# Patient Record
Sex: FEMALE | Race: BLACK OR AFRICAN AMERICAN | NOT HISPANIC OR LATINO | Employment: OTHER | URBAN - METROPOLITAN AREA
[De-identification: names, ages, dates, MRNs, and addresses within clinical notes are randomized per-mention and may not be internally consistent; named-entity substitution may affect disease eponyms.]

---

## 2018-06-27 ENCOUNTER — HOSPITAL ENCOUNTER (EMERGENCY)
Facility: HOSPITAL | Age: 58
Discharge: HOME | End: 2018-06-27
Attending: EMERGENCY MEDICINE
Payer: COMMERCIAL

## 2018-06-27 ENCOUNTER — APPOINTMENT (EMERGENCY)
Dept: RADIOLOGY | Facility: HOSPITAL | Age: 58
End: 2018-06-27
Payer: COMMERCIAL

## 2018-06-27 ENCOUNTER — APPOINTMENT (EMERGENCY)
Dept: CT IMAGING | Facility: HOSPITAL | Age: 58
End: 2018-06-27
Payer: COMMERCIAL

## 2018-06-27 VITALS
WEIGHT: 165 LBS | OXYGEN SATURATION: 98 % | TEMPERATURE: 97.4 F | RESPIRATION RATE: 23 BRPM | SYSTOLIC BLOOD PRESSURE: 146 MMHG | DIASTOLIC BLOOD PRESSURE: 90 MMHG | HEART RATE: 71 BPM

## 2018-06-27 DIAGNOSIS — Z91.19 PERSONAL HISTORY OF NONCOMPLIANCE WITH MEDICAL TREATMENT: ICD-10-CM

## 2018-06-27 DIAGNOSIS — R51.9 HEADACHE: Primary | ICD-10-CM

## 2018-06-27 LAB
ANION GAP SERPL CALCULATED.3IONS-SCNC: 8 MMOL/L (ref 5–14)
BASOPHILS # BLD AUTO: 0.1 THOUSANDS/ΜL (ref 0–0.1)
BASOPHILS NFR BLD AUTO: 1 % (ref 0–1)
BUN SERPL-MCNC: 11 MG/DL (ref 5–25)
CALCIUM SERPL-MCNC: 9.9 MG/DL (ref 8.4–10.2)
CHLORIDE SERPL-SCNC: 109 MMOL/L (ref 97–108)
CO2 SERPL-SCNC: 27 MMOL/L (ref 22–30)
CREAT SERPL-MCNC: 0.76 MG/DL (ref 0.6–1.2)
EOSINOPHIL # BLD AUTO: 0.1 THOUSAND/ΜL (ref 0–0.4)
EOSINOPHIL NFR BLD AUTO: 1 % (ref 0–6)
ERYTHROCYTE [DISTWIDTH] IN BLOOD BY AUTOMATED COUNT: 14.6 %
GFR SERPL CREATININE-BSD FRML MDRD: 100 ML/MIN/1.73SQ M
GLUCOSE SERPL-MCNC: 85 MG/DL (ref 70–99)
HCT VFR BLD AUTO: 40.1 % (ref 36–46)
HGB BLD-MCNC: 13.6 G/DL (ref 12–16)
LYMPHOCYTES # BLD AUTO: 3.2 THOUSANDS/ΜL (ref 0.5–4)
LYMPHOCYTES NFR BLD AUTO: 42 % (ref 20–50)
MCH RBC QN AUTO: 33 PG (ref 26–34)
MCHC RBC AUTO-ENTMCNC: 34.1 G/DL (ref 31–36)
MCV RBC AUTO: 97 FL (ref 80–100)
MONOCYTES # BLD AUTO: 0.5 THOUSAND/ΜL (ref 0.2–0.9)
MONOCYTES NFR BLD AUTO: 6 % (ref 1–10)
NEUTROPHILS # BLD AUTO: 3.9 THOUSANDS/ΜL (ref 1.8–7.8)
NEUTS SEG NFR BLD AUTO: 50 % (ref 45–65)
PLATELET # BLD AUTO: 267 THOUSANDS/UL (ref 150–450)
PMV BLD AUTO: 8.6 FL (ref 8.9–12.7)
POTASSIUM SERPL-SCNC: 3.7 MMOL/L (ref 3.6–5)
RBC # BLD AUTO: 4.13 MILLION/UL (ref 4–5.2)
SODIUM SERPL-SCNC: 144 MMOL/L (ref 137–147)
TROPONIN I SERPL-MCNC: <0.01 NG/ML (ref 0–0.03)
WBC # BLD AUTO: 7.7 THOUSAND/UL (ref 4.5–11)

## 2018-06-27 PROCEDURE — 85025 COMPLETE CBC W/AUTO DIFF WBC: CPT | Performed by: EMERGENCY MEDICINE

## 2018-06-27 PROCEDURE — 84484 ASSAY OF TROPONIN QUANT: CPT | Performed by: EMERGENCY MEDICINE

## 2018-06-27 PROCEDURE — 99284 EMERGENCY DEPT VISIT MOD MDM: CPT

## 2018-06-27 PROCEDURE — 36415 COLL VENOUS BLD VENIPUNCTURE: CPT | Performed by: EMERGENCY MEDICINE

## 2018-06-27 PROCEDURE — 93005 ELECTROCARDIOGRAM TRACING: CPT

## 2018-06-27 PROCEDURE — 70450 CT HEAD/BRAIN W/O DYE: CPT

## 2018-06-27 PROCEDURE — 71046 X-RAY EXAM CHEST 2 VIEWS: CPT

## 2018-06-27 PROCEDURE — 80048 BASIC METABOLIC PNL TOTAL CA: CPT | Performed by: EMERGENCY MEDICINE

## 2018-06-27 PROCEDURE — 96374 THER/PROPH/DIAG INJ IV PUSH: CPT

## 2018-06-27 RX ORDER — 0.9 % SODIUM CHLORIDE 0.9 %
3 VIAL (ML) INJECTION AS NEEDED
Status: DISCONTINUED | OUTPATIENT
Start: 2018-06-27 | End: 2018-06-27 | Stop reason: HOSPADM

## 2018-06-27 RX ORDER — TRAMADOL HYDROCHLORIDE 50 MG/1
50 TABLET ORAL ONCE
Status: COMPLETED | OUTPATIENT
Start: 2018-06-27 | End: 2018-06-27

## 2018-06-27 RX ORDER — ENALAPRIL MALEATE 10 MG/1
10 TABLET ORAL DAILY
Status: ON HOLD | COMMUNITY
End: 2018-09-20

## 2018-06-27 RX ORDER — LORAZEPAM 0.5 MG/1
TABLET ORAL
Status: COMPLETED
Start: 2018-06-27 | End: 2018-06-27

## 2018-06-27 RX ORDER — HYDRALAZINE HYDROCHLORIDE 20 MG/ML
10 INJECTION INTRAMUSCULAR; INTRAVENOUS ONCE
Status: COMPLETED | OUTPATIENT
Start: 2018-06-27 | End: 2018-06-27

## 2018-06-27 RX ORDER — HYDRALAZINE HYDROCHLORIDE 20 MG/ML
INJECTION INTRAMUSCULAR; INTRAVENOUS
Status: COMPLETED
Start: 2018-06-27 | End: 2018-06-27

## 2018-06-27 RX ORDER — LORAZEPAM 0.5 MG/1
0.5 TABLET ORAL ONCE
Status: COMPLETED | OUTPATIENT
Start: 2018-06-27 | End: 2018-06-27

## 2018-06-27 RX ORDER — TRAMADOL HYDROCHLORIDE 50 MG/1
TABLET ORAL
Status: DISCONTINUED
Start: 2018-06-27 | End: 2018-06-27 | Stop reason: HOSPADM

## 2018-06-27 RX ADMIN — LORAZEPAM 0.5 MG: 0.5 TABLET ORAL at 04:05

## 2018-06-27 RX ADMIN — TRAMADOL HYDROCHLORIDE 50 MG: 50 TABLET, COATED ORAL at 04:11

## 2018-06-27 RX ADMIN — HYDRALAZINE HYDROCHLORIDE 10 MG: 20 INJECTION INTRAMUSCULAR; INTRAVENOUS at 03:22

## 2018-06-27 NOTE — DISCHARGE INSTRUCTIONS
Acute Headache   WHAT YOU NEED TO KNOW:   An acute headache is pain or discomfort that starts suddenly and gets worse quickly  You may have an acute headache only when you feel stress or eat certain foods  Other acute headache pain can happen every day, and sometimes several times a day  DISCHARGE INSTRUCTIONS:   Return to the emergency department if:   · You have severe pain  · You have numbness or weakness on one side of your face or body  · You have a headache that occurs after a blow to the head, a fall, or other trauma  · You have a headache, are forgetful or confused, or have trouble speaking  · You have a headache, stiff neck, and a fever  Contact your healthcare provider if:   · You have a constant headache and are vomiting  · You have a headache each day that does not get better, even after treatment  · You have changes in your headaches, or new symptoms that occur when you have a headache  · You have questions or concerns about your condition or care  Medicines: You may need any of the following:  · Prescription pain medicine  may be given  The medicine your healthcare provider recommends will depend on the kind of headaches you have  You will need to take prescription headache medicines as directed to prevent a problem called rebound headache  These headaches happen with regular use of pain relievers for headache disorders  · NSAIDs , such as ibuprofen, help decrease swelling, pain, and fever  This medicine is available with or without a doctor's order  NSAIDs can cause stomach bleeding or kidney problems in certain people  If you take blood thinner medicine, always ask your healthcare provider if NSAIDs are safe for you  Always read the medicine label and follow directions  · Acetaminophen  decreases pain and fever  It is available without a doctor's order  Ask how much to take and how often to take it  Follow directions   Read the labels of all other medicines you are using to see if they also contain acetaminophen, or ask your doctor or pharmacist  Acetaminophen can cause liver damage if not taken correctly  Do not use more than 3 grams (3,000 milligrams) total of acetaminophen in one day  · Antidepressants  may be given for some kinds of headaches  · Take your medicine as directed  Contact your healthcare provider if you think your medicine is not helping or if you have side effects  Tell him or her if you are allergic to any medicine  Keep a list of the medicines, vitamins, and herbs you take  Include the amounts, and when and why you take them  Bring the list or the pill bottles to follow-up visits  Carry your medicine list with you in case of an emergency  Manage your symptoms:   · Apply heat or ice  on the headache area  Use a heat or ice pack  For an ice pack, you can also put crushed ice in a plastic bag  Cover the pack or bag with a towel before you apply it to your skin  Ice and heat both help decrease pain, and heat also helps decrease muscle spasms  Apply heat for 20 to 30 minutes every 2 hours  Apply ice for 15 to 20 minutes every hour  Apply heat or ice for as long and for as many days as directed  You may alternate heat and ice  · Relax your muscles  Lie down in a comfortable position and close your eyes  Relax your muscles slowly  Start at your toes and work your way up your body  · Keep a record of your headaches  Write down when your headaches start and stop  Include your symptoms and what you were doing when the headache began  Record what you ate or drank for 24 hours before the headache started  Describe the pain and where it hurts  Keep track of what you did to treat your headache and if it worked  Prevent an acute headache:   · Avoid anything that triggers an acute headache  Examples include exposure to chemicals, going to high altitude, or not getting enough sleep  Create a regular sleep routine   Go to sleep at the same time and wake up at the same time each day  Do not use electronic devices before bedtime  These may trigger a headache or prevent you from sleeping well  · Do not smoke  Nicotine and other chemicals in cigarettes and cigars can trigger an acute headache or make it worse  Ask your healthcare provider for information if you currently smoke and need help to quit  E-cigarettes or smokeless tobacco still contain nicotine  Talk to your healthcare provider before you use these products  · Limit alcohol as directed  Alcohol can trigger an acute headache or make it worse  If you have cluster headaches, do not drink alcohol during an episode  For other types of headaches, ask your healthcare provider if it is safe for you to drink alcohol  Ask how much is safe for you to drink, and how often  · Exercise as directed  Exercise can reduce tension and help with headache pain  Aim for 30 minutes of physical activity on most days of the week  Your healthcare provider can help you create an exercise plan  · Eat a variety of healthy foods  Healthy foods include fruits, vegetables, low-fat dairy products, lean meats, fish, whole grains, and cooked beans  Your healthcare provider or dietitian can help you create meals plans if you need to avoid foods that trigger headaches  Follow up with your healthcare provider as directed:  Bring your headache record with you when you see your healthcare provider  Write down your questions so you remember to ask them during your visits  © 2017 [facility] Information is for End User's use only and may not be sold, redistributed or otherwise used for commercial purposes  All illustrations and images included in CareNotes® are the copyrighted property of A D A M [employer]  or Moises Simmons  The above information is an  only  It is not intended as medical advice for individual conditions or treatments   Talk to your doctor, nurse or pharmacist before following any medical regimen to see if it is safe and effective for you

## 2018-06-27 NOTE — ED PROVIDER NOTES
History  Chief Complaint   Patient presents with    Headache     Patient reports worsening headache starting two days ago  She also reports arthritis pain  HPI  61 y/o AAF BBA for reports of worsening h/a x 2 days duration along with chest pressure starting this morning  Patient is a poor historian; non-compliant with medications and unable to answer most questions accurately  Denies shortness of breath and/or nausea with chest pressure  Pressure is constant and worsens with movement  Denies fever  Prior to Admission Medications   Prescriptions Last Dose Informant Patient Reported? Taking?   enalapril (VASOTEC) 10 mg tablet  Self Yes Yes   Sig: Take 10 mg by mouth daily      Facility-Administered Medications: None       Past Medical History:   Diagnosis Date    Diabetes mellitus ([facility] [address] )     Hypertension     Psychiatric disorder        Past Surgical History:   Procedure Laterality Date    HYSTERECTOMY         No family history on file  I have reviewed and agree with the history as documented  Social History   Substance Use Topics    Smoking status: Current Every Day Smoker     Packs/day: 2 00     Types: Cigarettes    Smokeless tobacco: Not on file    Alcohol use Not on file        Review of Systems   Cardiovascular: Positive for chest pain  Neurological: Positive for headaches  All other systems reviewed and are negative  Physical Exam  Physical Exam   Constitutional: She is oriented to person, place, and time  She appears well-developed and well-nourished  HENT:   Head: Normocephalic and atraumatic  Eyes: EOM are normal  Pupils are equal, round, and reactive to light  Neck: Normal range of motion  Neck supple  Cardiovascular: Normal rate, regular rhythm and normal heart sounds  Pulmonary/Chest: Effort normal and breath sounds normal    Abdominal: Soft  Bowel sounds are normal    Musculoskeletal: Normal range of motion     Neurological: She is alert and oriented to person, place, and time  Skin: Skin is warm and dry  Psychiatric: She has a normal mood and affect  Vitals reviewed  Vital Signs  ED Triage Vitals [06/27/18 0303]   Temperature Pulse Respirations Blood Pressure SpO2   (!) 96 6 °F (35 9 °C) 68 20 (!) 196/110 --      Temp Source Heart Rate Source Patient Position - Orthostatic VS BP Location FiO2 (%)   Tympanic Monitor Sitting Right arm --      Pain Score       Worst Possible Pain           Vitals:    06/27/18 0303   BP: (!) 196/110   Pulse: 68   Patient Position - Orthostatic VS: Sitting       Visual Acuity      ED Medications  Medications   sodium chloride (PF) 0 9 % injection 3 mL (not administered)   hydrALAZINE (APRESOLINE) injection 10 mg (not administered)       Diagnostic Studies  Results Reviewed     Procedure Component Value Units Date/Time    CBC and differential [[id number]] Collected:  06/27/18 0315    Lab Status:  No result Specimen:  Blood from Arm, Left     Basic metabolic panel [[id number]] Collected:  06/27/18 0315    Lab Status:  No result Specimen:  Blood from Arm, Left     Troponin I [[id number]] Collected:  06/27/18 0315    Lab Status:  No result Specimen:  Blood from Arm, Left                  X-ray chest 2 views    (Results Pending)   CT head without contrast    (Results Pending)              Procedures  Procedures       Phone Contacts  ED Phone Contact    ED Course  ED Course as of Jun 27 0508 Wed Jun 27, 2018   0321 EKG: NSR @ 66 bpm, no ischemic changes                                MDM  Number of Diagnoses or Management Options  Diagnosis management comments: Patient feeling much better; bp improved to 160/93  Will refer to [facility] for med management         Amount and/or Complexity of Data Reviewed  Clinical lab tests: ordered and reviewed  Tests in the radiology section of CPT®: ordered and reviewed    Risk of Complications, Morbidity, and/or Mortality  Presenting problems: moderate  Diagnostic procedures: minimal  Management options: low    Patient Progress  Patient progress: improved    CritCare Time    Disposition  Final diagnoses:   None     ED Disposition     None      Follow-up Information    None         Patient's Medications   Discharge Prescriptions    No medications on file     No discharge procedures on file      ED Provider  Electronically Signed by           Saúl Godinez DO  06/27/18 0983

## 2018-06-28 LAB
ATRIAL RATE: 66 BPM
P AXIS: 56 DEGREES
PR INTERVAL: 166 MS
QRS AXIS: 15 DEGREES
QRSD INTERVAL: 72 MS
QT INTERVAL: 454 MS
QTC INTERVAL: 475 MS
T WAVE AXIS: 25 DEGREES
VENTRICULAR RATE: 66 BPM

## 2018-06-28 PROCEDURE — 93010 ELECTROCARDIOGRAM REPORT: CPT | Performed by: INTERNAL MEDICINE

## 2018-09-20 ENCOUNTER — APPOINTMENT (OUTPATIENT)
Dept: NEUROLOGY | Facility: HOSPITAL | Age: 58
End: 2018-09-20
Payer: COMMERCIAL

## 2018-09-20 ENCOUNTER — APPOINTMENT (EMERGENCY)
Dept: CT IMAGING | Facility: HOSPITAL | Age: 58
End: 2018-09-20
Payer: COMMERCIAL

## 2018-09-20 ENCOUNTER — HOSPITAL ENCOUNTER (OUTPATIENT)
Facility: HOSPITAL | Age: 58
Setting detail: OBSERVATION
Discharge: HOME | End: 2018-09-20
Attending: EMERGENCY MEDICINE | Admitting: HOSPITALIST
Payer: COMMERCIAL

## 2018-09-20 ENCOUNTER — APPOINTMENT (OUTPATIENT)
Dept: NON INVASIVE DIAGNOSTICS | Facility: HOSPITAL | Age: 58
End: 2018-09-20
Payer: COMMERCIAL

## 2018-09-20 VITALS
RESPIRATION RATE: 18 BRPM | TEMPERATURE: 97 F | DIASTOLIC BLOOD PRESSURE: 101 MMHG | HEART RATE: 55 BPM | SYSTOLIC BLOOD PRESSURE: 157 MMHG | HEIGHT: 64 IN | BODY MASS INDEX: 27.66 KG/M2 | OXYGEN SATURATION: 96 % | WEIGHT: 162.04 LBS

## 2018-09-20 DIAGNOSIS — Z91.89 AT RISK FOR POLYPHARMACY: ICD-10-CM

## 2018-09-20 DIAGNOSIS — R00.1 BRADYCARDIA: ICD-10-CM

## 2018-09-20 DIAGNOSIS — Z72.0 TOBACCO ABUSE: ICD-10-CM

## 2018-09-20 DIAGNOSIS — I10 ESSENTIAL HYPERTENSION: Chronic | ICD-10-CM

## 2018-09-20 DIAGNOSIS — I95.9 HYPOTENSION: Primary | ICD-10-CM

## 2018-09-20 DIAGNOSIS — R09.02 HYPOXIA: ICD-10-CM

## 2018-09-20 DIAGNOSIS — R41.82 ALTERED MENTAL STATUS: ICD-10-CM

## 2018-09-20 DIAGNOSIS — R55 SYNCOPE: ICD-10-CM

## 2018-09-20 PROBLEM — F41.9 ANXIETY AND DEPRESSION: Status: ACTIVE | Noted: 2018-09-20

## 2018-09-20 PROBLEM — E11.9 TYPE 2 DIABETES MELLITUS WITHOUT COMPLICATION, WITHOUT LONG-TERM CURRENT USE OF INSULIN (HCC): Chronic | Status: RESOLVED | Noted: 2018-09-20 | Resolved: 2018-09-20

## 2018-09-20 PROBLEM — E11.9 TYPE 2 DIABETES MELLITUS WITHOUT COMPLICATION, WITHOUT LONG-TERM CURRENT USE OF INSULIN (HCC): Chronic | Status: ACTIVE | Noted: 2018-09-20

## 2018-09-20 PROBLEM — F32.A ANXIETY AND DEPRESSION: Status: ACTIVE | Noted: 2018-09-20

## 2018-09-20 PROBLEM — E11.9 TYPE 2 DIABETES MELLITUS WITHOUT COMPLICATION, WITHOUT LONG-TERM CURRENT USE OF INSULIN (HCC): Status: ACTIVE | Noted: 2018-09-20

## 2018-09-20 PROBLEM — E03.9 ACQUIRED HYPOTHYROIDISM: Status: ACTIVE | Noted: 2018-09-20

## 2018-09-20 PROBLEM — E78.49 OTHER HYPERLIPIDEMIA: Chronic | Status: ACTIVE | Noted: 2018-09-20

## 2018-09-20 PROBLEM — E78.49 OTHER HYPERLIPIDEMIA: Status: ACTIVE | Noted: 2018-09-20

## 2018-09-20 PROBLEM — E78.49 OTHER HYPERLIPIDEMIA: Chronic | Status: RESOLVED | Noted: 2018-09-20 | Resolved: 2018-09-20

## 2018-09-20 PROBLEM — E03.9 ACQUIRED HYPOTHYROIDISM: Chronic | Status: ACTIVE | Noted: 2018-09-20

## 2018-09-20 PROBLEM — E03.9 ACQUIRED HYPOTHYROIDISM: Chronic | Status: RESOLVED | Noted: 2018-09-20 | Resolved: 2018-09-20

## 2018-09-20 LAB
ALBUMIN SERPL BCP-MCNC: 4 G/DL (ref 3–5.2)
ALP SERPL-CCNC: 58 U/L (ref 43–122)
ALT SERPL W P-5'-P-CCNC: 23 U/L (ref 9–52)
AMPHETAMINES SERPL QL SCN: NEGATIVE
ANION GAP SERPL CALCULATED.3IONS-SCNC: 7 MMOL/L (ref 5–14)
ANION GAP SERPL CALCULATED.3IONS-SCNC: 7 MMOL/L (ref 5–14)
APAP SERPL-MCNC: <10 UG/ML (ref 10–30)
AST SERPL W P-5'-P-CCNC: 42 U/L (ref 14–36)
ATRIAL RATE: 49 BPM
ATRIAL RATE: 52 BPM
ATRIAL RATE: 61 BPM
BARBITURATES UR QL: NEGATIVE
BASOPHILS # BLD AUTO: 0 THOUSANDS/ΜL (ref 0–0.1)
BASOPHILS NFR BLD AUTO: 1 % (ref 0–1)
BENZODIAZ UR QL: POSITIVE
BILIRUB DIRECT SERPL-MCNC: 0.1 MG/DL
BILIRUB SERPL-MCNC: 0.5 MG/DL
BILIRUB UR QL STRIP: NEGATIVE
BUN SERPL-MCNC: 7 MG/DL (ref 5–25)
BUN SERPL-MCNC: 8 MG/DL (ref 5–25)
CALCIUM SERPL-MCNC: 8.9 MG/DL (ref 8.4–10.2)
CALCIUM SERPL-MCNC: 9.3 MG/DL (ref 8.4–10.2)
CHLORIDE SERPL-SCNC: 108 MMOL/L (ref 97–108)
CHLORIDE SERPL-SCNC: 110 MMOL/L (ref 97–108)
CHOLEST SERPL-MCNC: 165 MG/DL
CLARITY UR: ABNORMAL
CO2 SERPL-SCNC: 22 MMOL/L (ref 22–30)
CO2 SERPL-SCNC: 24 MMOL/L (ref 22–30)
COCAINE UR QL: NEGATIVE
COLOR UR: ABNORMAL
CREAT SERPL-MCNC: 0.7 MG/DL (ref 0.6–1.2)
CREAT SERPL-MCNC: 0.73 MG/DL (ref 0.6–1.2)
EOSINOPHIL # BLD AUTO: 0 THOUSAND/ΜL (ref 0–0.4)
EOSINOPHIL NFR BLD AUTO: 1 % (ref 0–6)
ERYTHROCYTE [DISTWIDTH] IN BLOOD BY AUTOMATED COUNT: 15.9 %
EST. AVERAGE GLUCOSE BLD GHB EST-MCNC: 111 MG/DL
ETHANOL SERPL-MCNC: <10 MG/DL (ref 0–10)
GFR SERPL CREATININE-BSD FRML MDRD: 105 ML/MIN/1.73SQ M
GFR SERPL CREATININE-BSD FRML MDRD: 110 ML/MIN/1.73SQ M
GLUCOSE SERPL-MCNC: 104 MG/DL (ref 70–99)
GLUCOSE SERPL-MCNC: 110 MG/DL (ref 70–99)
GLUCOSE SERPL-MCNC: 117 MG/DL (ref 70–99)
GLUCOSE SERPL-MCNC: 85 MG/DL (ref 70–99)
GLUCOSE SERPL-MCNC: 89 MG/DL (ref 70–99)
GLUCOSE UR STRIP-MCNC: NEGATIVE MG/DL
HBA1C MFR BLD: 5.5 % (ref 4.2–6.3)
HCT VFR BLD AUTO: 37.9 % (ref 36–46)
HDLC SERPL-MCNC: 43 MG/DL (ref 40–59)
HGB BLD-MCNC: 13 G/DL (ref 12–16)
HGB UR QL STRIP.AUTO: NEGATIVE
KETONES UR STRIP-MCNC: NEGATIVE MG/DL
LDLC SERPL CALC-MCNC: 100 MG/DL
LEUKOCYTE ESTERASE UR QL STRIP: NEGATIVE
LYMPHOCYTES # BLD AUTO: 2.4 THOUSANDS/ΜL (ref 0.5–4)
LYMPHOCYTES NFR BLD AUTO: 42 % (ref 20–50)
MAGNESIUM SERPL-MCNC: 1.8 MG/DL (ref 1.6–2.3)
MAGNESIUM SERPL-MCNC: 1.8 MG/DL (ref 1.6–2.3)
MCH RBC QN AUTO: 32.3 PG (ref 26–34)
MCHC RBC AUTO-ENTMCNC: 34.2 G/DL (ref 31–36)
MCV RBC AUTO: 94 FL (ref 80–100)
METHADONE UR QL: NEGATIVE
MONOCYTES # BLD AUTO: 0.3 THOUSAND/ΜL (ref 0.2–0.9)
MONOCYTES NFR BLD AUTO: 6 % (ref 1–10)
NEUTROPHILS # BLD AUTO: 2.9 THOUSANDS/ΜL (ref 1.8–7.8)
NEUTS SEG NFR BLD AUTO: 51 % (ref 45–65)
NITRITE UR QL STRIP: NEGATIVE
OPIATES UR QL SCN: NEGATIVE
P AXIS: 29 DEGREES
P AXIS: 36 DEGREES
P AXIS: 43 DEGREES
PCP UR QL: NEGATIVE
PH UR STRIP.AUTO: 5 [PH] (ref 4.5–8)
PHOSPHATE SERPL-MCNC: 3.6 MG/DL (ref 2.5–4.8)
PLATELET # BLD AUTO: 221 THOUSANDS/UL (ref 150–450)
PLATELET # BLD AUTO: 235 THOUSANDS/UL (ref 150–450)
PMV BLD AUTO: 9.2 FL (ref 8.9–12.7)
POTASSIUM SERPL-SCNC: 3.6 MMOL/L (ref 3.6–5)
POTASSIUM SERPL-SCNC: 4 MMOL/L (ref 3.6–5)
PR INTERVAL: 158 MS
PR INTERVAL: 170 MS
PR INTERVAL: 170 MS
PROT SERPL-MCNC: 7 G/DL (ref 5.9–8.4)
PROT UR STRIP-MCNC: NEGATIVE MG/DL
QRS AXIS: 31 DEGREES
QRS AXIS: 65 DEGREES
QRS AXIS: 67 DEGREES
QRSD INTERVAL: 76 MS
QRSD INTERVAL: 78 MS
QRSD INTERVAL: 78 MS
QT INTERVAL: 446 MS
QT INTERVAL: 464 MS
QT INTERVAL: 538 MS
QTC INTERVAL: 431 MS
QTC INTERVAL: 448 MS
QTC INTERVAL: 485 MS
RBC # BLD AUTO: 4.02 MILLION/UL (ref 4–5.2)
SALICYLATES SERPL-MCNC: <1 MG/DL (ref 10–30)
SODIUM SERPL-SCNC: 139 MMOL/L (ref 137–147)
SODIUM SERPL-SCNC: 139 MMOL/L (ref 137–147)
SP GR UR STRIP.AUTO: 1.01 (ref 1–1.04)
T WAVE AXIS: 134 DEGREES
T WAVE AXIS: 15 DEGREES
T WAVE AXIS: 215 DEGREES
THC UR QL: NEGATIVE
TRIGL SERPL-MCNC: 111 MG/DL
TROPONIN I SERPL-MCNC: <0.01 NG/ML (ref 0–0.03)
TSH SERPL DL<=0.05 MIU/L-ACNC: 4.54 UIU/ML (ref 0.47–4.68)
UROBILINOGEN UA: NEGATIVE MG/DL
VENTRICULAR RATE: 49 BPM
VENTRICULAR RATE: 52 BPM
VENTRICULAR RATE: 61 BPM
WBC # BLD AUTO: 5.7 THOUSAND/UL (ref 4.5–11)

## 2018-09-20 PROCEDURE — 85049 AUTOMATED PLATELET COUNT: CPT | Performed by: HOSPITALIST

## 2018-09-20 PROCEDURE — 84100 ASSAY OF PHOSPHORUS: CPT | Performed by: HOSPITALIST

## 2018-09-20 PROCEDURE — 96361 HYDRATE IV INFUSION ADD-ON: CPT

## 2018-09-20 PROCEDURE — 84484 ASSAY OF TROPONIN QUANT: CPT | Performed by: HOSPITALIST

## 2018-09-20 PROCEDURE — 83036 HEMOGLOBIN GLYCOSYLATED A1C: CPT | Performed by: HOSPITALIST

## 2018-09-20 PROCEDURE — 80329 ANALGESICS NON-OPIOID 1 OR 2: CPT | Performed by: EMERGENCY MEDICINE

## 2018-09-20 PROCEDURE — 70450 CT HEAD/BRAIN W/O DYE: CPT

## 2018-09-20 PROCEDURE — 84484 ASSAY OF TROPONIN QUANT: CPT | Performed by: EMERGENCY MEDICINE

## 2018-09-20 PROCEDURE — 83735 ASSAY OF MAGNESIUM: CPT | Performed by: EMERGENCY MEDICINE

## 2018-09-20 PROCEDURE — 93005 ELECTROCARDIOGRAM TRACING: CPT

## 2018-09-20 PROCEDURE — 99220 PR INITIAL OBSERVATION CARE/DAY 70 MINUTES: CPT | Performed by: HOSPITALIST

## 2018-09-20 PROCEDURE — 95816 EEG AWAKE AND DROWSY: CPT

## 2018-09-20 PROCEDURE — 80048 BASIC METABOLIC PNL TOTAL CA: CPT | Performed by: HOSPITALIST

## 2018-09-20 PROCEDURE — 80061 LIPID PANEL: CPT | Performed by: HOSPITALIST

## 2018-09-20 PROCEDURE — 99202 OFFICE O/P NEW SF 15 MIN: CPT | Performed by: PSYCHIATRY & NEUROLOGY

## 2018-09-20 PROCEDURE — 84443 ASSAY THYROID STIM HORMONE: CPT | Performed by: EMERGENCY MEDICINE

## 2018-09-20 PROCEDURE — 93306 TTE W/DOPPLER COMPLETE: CPT | Performed by: INTERNAL MEDICINE

## 2018-09-20 PROCEDURE — 36415 COLL VENOUS BLD VENIPUNCTURE: CPT | Performed by: EMERGENCY MEDICINE

## 2018-09-20 PROCEDURE — 99217 PR OBSERVATION CARE DISCHARGE MANAGEMENT: CPT | Performed by: FAMILY MEDICINE

## 2018-09-20 PROCEDURE — 99285 EMERGENCY DEPT VISIT HI MDM: CPT

## 2018-09-20 PROCEDURE — 93306 TTE W/DOPPLER COMPLETE: CPT

## 2018-09-20 PROCEDURE — 71275 CT ANGIOGRAPHY CHEST: CPT

## 2018-09-20 PROCEDURE — 93010 ELECTROCARDIOGRAM REPORT: CPT | Performed by: INTERNAL MEDICINE

## 2018-09-20 PROCEDURE — 80307 DRUG TEST PRSMV CHEM ANLYZR: CPT | Performed by: HOSPITALIST

## 2018-09-20 PROCEDURE — 95816 EEG AWAKE AND DROWSY: CPT | Performed by: PSYCHIATRY & NEUROLOGY

## 2018-09-20 PROCEDURE — 80076 HEPATIC FUNCTION PANEL: CPT | Performed by: EMERGENCY MEDICINE

## 2018-09-20 PROCEDURE — 82948 REAGENT STRIP/BLOOD GLUCOSE: CPT

## 2018-09-20 PROCEDURE — 83735 ASSAY OF MAGNESIUM: CPT | Performed by: HOSPITALIST

## 2018-09-20 PROCEDURE — 81003 URINALYSIS AUTO W/O SCOPE: CPT | Performed by: EMERGENCY MEDICINE

## 2018-09-20 PROCEDURE — 96360 HYDRATION IV INFUSION INIT: CPT

## 2018-09-20 PROCEDURE — 80320 DRUG SCREEN QUANTALCOHOLS: CPT | Performed by: EMERGENCY MEDICINE

## 2018-09-20 PROCEDURE — 85025 COMPLETE CBC W/AUTO DIFF WBC: CPT | Performed by: EMERGENCY MEDICINE

## 2018-09-20 PROCEDURE — 80048 BASIC METABOLIC PNL TOTAL CA: CPT | Performed by: EMERGENCY MEDICINE

## 2018-09-20 RX ORDER — AMLODIPINE BESYLATE 5 MG/1
10 TABLET ORAL DAILY
Status: DISCONTINUED | OUTPATIENT
Start: 2018-09-20 | End: 2018-09-20

## 2018-09-20 RX ORDER — QUETIAPINE FUMARATE 100 MG/1
100 TABLET, FILM COATED ORAL
COMMUNITY
End: 2019-11-04

## 2018-09-20 RX ORDER — HYDROCHLOROTHIAZIDE 25 MG/1
25 TABLET ORAL DAILY
Status: DISCONTINUED | OUTPATIENT
Start: 2018-09-20 | End: 2018-09-20

## 2018-09-20 RX ORDER — AMLODIPINE BESYLATE 10 MG/1
10 TABLET ORAL DAILY
Status: ON HOLD | COMMUNITY
End: 2018-09-20

## 2018-09-20 RX ORDER — ONDANSETRON 2 MG/ML
INJECTION INTRAMUSCULAR; INTRAVENOUS
Status: COMPLETED
Start: 2018-09-20 | End: 2018-09-20

## 2018-09-20 RX ORDER — ACETAMINOPHEN 325 MG/1
650 TABLET ORAL EVERY 6 HOURS PRN
Status: DISCONTINUED | OUTPATIENT
Start: 2018-09-20 | End: 2018-09-20 | Stop reason: HOSPADM

## 2018-09-20 RX ORDER — ALPRAZOLAM 0.5 MG/1
TABLET ORAL 3 TIMES DAILY PRN
COMMUNITY
End: 2019-11-04

## 2018-09-20 RX ORDER — AMLODIPINE BESYLATE 5 MG/1
5 TABLET ORAL DAILY
Status: DISCONTINUED | OUTPATIENT
Start: 2018-09-20 | End: 2018-09-20 | Stop reason: HOSPADM

## 2018-09-20 RX ORDER — ONDANSETRON 2 MG/ML
4 INJECTION INTRAMUSCULAR; INTRAVENOUS EVERY 6 HOURS PRN
Status: DISCONTINUED | OUTPATIENT
Start: 2018-09-20 | End: 2018-09-20

## 2018-09-20 RX ORDER — ALBUTEROL SULFATE 90 UG/1
2 AEROSOL, METERED RESPIRATORY (INHALATION) EVERY 6 HOURS PRN
COMMUNITY
End: 2018-09-20 | Stop reason: HOSPADM

## 2018-09-20 RX ORDER — SIMVASTATIN 40 MG
40 TABLET ORAL
COMMUNITY
End: 2018-09-20 | Stop reason: HOSPADM

## 2018-09-20 RX ORDER — NICOTINE 21 MG/24HR
1 PATCH, TRANSDERMAL 24 HOURS TRANSDERMAL DAILY
Qty: 28 PATCH | Refills: 0 | Status: SHIPPED | OUTPATIENT
Start: 2018-09-21 | End: 2019-11-04

## 2018-09-20 RX ORDER — AMLODIPINE BESYLATE 10 MG/1
10 TABLET ORAL DAILY
Qty: 30 TABLET | Refills: 0 | Status: SHIPPED | OUTPATIENT
Start: 2018-09-20 | End: 2019-11-04

## 2018-09-20 RX ORDER — HYDROCHLOROTHIAZIDE 25 MG/1
25 TABLET ORAL DAILY
Status: ON HOLD | COMMUNITY
End: 2018-09-20

## 2018-09-20 RX ORDER — DOCUSATE SODIUM 100 MG/1
100 CAPSULE, LIQUID FILLED ORAL 2 TIMES DAILY
Status: DISCONTINUED | OUTPATIENT
Start: 2018-09-20 | End: 2018-09-20 | Stop reason: HOSPADM

## 2018-09-20 RX ORDER — DULOXETIN HYDROCHLORIDE 30 MG/1
20 CAPSULE, DELAYED RELEASE ORAL DAILY
COMMUNITY
End: 2018-09-20 | Stop reason: HOSPADM

## 2018-09-20 RX ORDER — ALBUTEROL SULFATE 90 UG/1
2 AEROSOL, METERED RESPIRATORY (INHALATION) EVERY 6 HOURS PRN
Status: DISCONTINUED | OUTPATIENT
Start: 2018-09-20 | End: 2018-09-20 | Stop reason: HOSPADM

## 2018-09-20 RX ORDER — NICOTINE 21 MG/24HR
1 PATCH, TRANSDERMAL 24 HOURS TRANSDERMAL DAILY
Status: DISCONTINUED | OUTPATIENT
Start: 2018-09-20 | End: 2018-09-20 | Stop reason: HOSPADM

## 2018-09-20 RX ORDER — HYDROCHLOROTHIAZIDE 12.5 MG/1
12.5 TABLET ORAL DAILY
Qty: 30 TABLET | Refills: 0 | Status: SHIPPED | OUTPATIENT
Start: 2018-09-20 | End: 2019-11-04

## 2018-09-20 RX ORDER — PRAVASTATIN SODIUM 40 MG
80 TABLET ORAL
Status: DISCONTINUED | OUTPATIENT
Start: 2018-09-20 | End: 2018-09-20

## 2018-09-20 RX ORDER — SENNOSIDES 8.6 MG
1 TABLET ORAL DAILY
Status: DISCONTINUED | OUTPATIENT
Start: 2018-09-20 | End: 2018-09-20 | Stop reason: HOSPADM

## 2018-09-20 RX ORDER — SODIUM CHLORIDE 9 MG/ML
75 INJECTION, SOLUTION INTRAVENOUS CONTINUOUS
Status: DISCONTINUED | OUTPATIENT
Start: 2018-09-20 | End: 2018-09-20

## 2018-09-20 RX ORDER — HYDROCHLOROTHIAZIDE 12.5 MG/1
12.5 TABLET ORAL DAILY
Status: DISCONTINUED | OUTPATIENT
Start: 2018-09-20 | End: 2018-09-20

## 2018-09-20 RX ORDER — ENALAPRIL MALEATE 10 MG/1
10 TABLET ORAL DAILY
Qty: 30 TABLET | Refills: 0 | Status: SHIPPED | OUTPATIENT
Start: 2018-09-20 | End: 2019-11-04

## 2018-09-20 RX ORDER — ATROPINE SULFATE 0.1 MG/ML
INJECTION INTRAVENOUS
Status: DISCONTINUED
Start: 2018-09-20 | End: 2018-09-20 | Stop reason: WASHOUT

## 2018-09-20 RX ORDER — LISINOPRIL 10 MG/1
10 TABLET ORAL DAILY
Status: DISCONTINUED | OUTPATIENT
Start: 2018-09-20 | End: 2018-09-20

## 2018-09-20 RX ORDER — AMLODIPINE BESYLATE 5 MG/1
5 TABLET ORAL DAILY
Status: DISCONTINUED | OUTPATIENT
Start: 2018-09-20 | End: 2018-09-20

## 2018-09-20 RX ADMIN — NICOTINE 1 PATCH: 14 PATCH TRANSDERMAL at 09:43

## 2018-09-20 RX ADMIN — SODIUM CHLORIDE 100 ML/HR: 9 INJECTION, SOLUTION INTRAVENOUS at 03:13

## 2018-09-20 RX ADMIN — HYDROCHLOROTHIAZIDE 12.5 MG: 12.5 TABLET ORAL at 09:42

## 2018-09-20 RX ADMIN — SENNOSIDES 8.6 MG: 8.6 TABLET, FILM COATED ORAL at 09:43

## 2018-09-20 RX ADMIN — SODIUM CHLORIDE 1000 ML: 9 INJECTION, SOLUTION INTRAVENOUS at 01:45

## 2018-09-20 RX ADMIN — AMLODIPINE BESYLATE 5 MG: 5 TABLET ORAL at 09:42

## 2018-09-20 RX ADMIN — IOHEXOL 85 ML: 350 INJECTION, SOLUTION INTRAVENOUS at 02:23

## 2018-09-20 RX ADMIN — DOCUSATE SODIUM 100 MG: 100 CAPSULE, LIQUID FILLED ORAL at 09:43

## 2018-09-20 RX ADMIN — ONDANSETRON 4 MG: 2 INJECTION, SOLUTION INTRAMUSCULAR; INTRAVENOUS at 02:05

## 2018-09-20 RX ADMIN — ENOXAPARIN SODIUM 40 MG: 40 INJECTION SUBCUTANEOUS at 09:42

## 2018-09-20 RX ADMIN — SODIUM CHLORIDE 1000 ML: 9 INJECTION, SOLUTION INTRAVENOUS at 02:00

## 2018-09-20 RX ADMIN — LISINOPRIL 10 MG: 10 TABLET ORAL at 09:43

## 2018-09-20 NOTE — CONSULTS
Consultation - Neurology   Mony Alcaraz 62 y o  female MRN: [id number]  Unit/Bed#: 7T [facility] 715-01 Encounter: [id number]      Assessment/Plan   Assessment:  Single episode of loss of consciousness more in keeping with syncope than seizure, likey medication related  No hard support for epileptogenic origin  Plan:  1   2D echocardiogram performed earlier (result pending)  2   Antihypertensive and psychiatric medication adjustment as per Primary Service  Discussed with Dr Immanuel Talamantes  Neurology will remain available to advise  Reason for Consultation:   Episode of loss of consciousness  HPI:   Mony Alcaraz is a 62 y o  right handed female who presents with an episode of loss of consciousness  History was taken from the patient and from the patient's , who was at bedside  According to the patient's , she was in her usual state of health late last evening when she drank 2-3 small beers and took her psychiatric medication (including Seroquel, duloxetine, and Xanax), which she had not taken for about 3 months  She is also supposed to be taking antihypertensive medication, but has not taken it in about 6 months  Her  was sitting on the porch when he heard his niece screaming for his attention  The patient had apparently gotten up in the middle of the night to use the restroom when she describes feeling very light-headed and suddenly lost consciousness for a few seconds  She was found by her niece, who did not observe any adventitious movements  The patient denies any confusion after regaining consciousness, tongue bite, bowel or bladder incontinence  She denies any past history of loss of consciousness or seizure  EMS found her to be bradycardic and her pulse in the emergency department upon arrival was 55  EEG was performed this morning and was a normal awake, drowsing, and sleep study  Review of Systems   Constitutional: Negative  HENT: Negative  Eyes: Negative  Respiratory: Negative  Cardiovascular: Negative  Gastrointestinal: Negative  Endocrine: Positive for cold intolerance  Genitourinary: Negative  Musculoskeletal: Positive for arthralgias  Skin: Negative for rash  Allergic/Immunologic: Negative  Neurological: Positive for syncope  As above  Hematological: Negative  Psychiatric/Behavioral: Positive for dysphoric mood  The patient is nervous/anxious  Historical Information   Past Medical History:   Diagnosis Date    Chronic pain     back pain     Diabetes mellitus ([address] )     Disease of thyroid gland     hypothyroid     Hyperlipidemia     Hypertension     Psychiatric disorder      Past Surgical History:   Procedure Laterality Date    HYSTERECTOMY       Social History    History reviewed  No pertinent family history  Allergies   Allergen Reactions    Aspirin      Patient does not know         PTA meds:   Prior to Admission Medications   Prescriptions Last Dose Informant Patient Reported? Taking?    ALPRAZolam (XANAX) 0 5 mg tablet 9/19/2018 at 2200  Yes Yes   Sig: Take by mouth 3 (three) times a day as needed for anxiety   DULoxetine (CYMBALTA) 30 mg delayed release capsule 9/19/2018 at 2200  Yes Yes   Sig: Take 20 mg by mouth daily   QUEtiapine (SEROquel) 100 mg tablet 9/19/2018 at 2200  Yes Yes   Sig: Take 100 mg by mouth daily at bedtime   albuterol (PROVENTIL HFA,VENTOLIN HFA) 90 mcg/act inhaler   Yes Yes   Sig: Inhale 2 puffs every 6 (six) hours as needed for wheezing   amLODIPine (NORVASC) 10 mg tablet More than a month at Unknown time  Yes No   Sig: Take 10 mg by mouth daily   enalapril (VASOTEC) 10 mg tablet More than a month at Unknown time Self Yes No   Sig: Take 10 mg by mouth daily   hydrochlorothiazide (HYDRODIURIL) 25 mg tablet More than a month at Unknown time  Yes No   Sig: Take 25 mg by mouth daily   metFORMIN (GLUCOPHAGE) 500 mg tablet More than a month at Unknown time  Yes No   Sig: Take 500 mg by mouth 2 (two) times a day with meals   simvastatin (ZOCOR) 40 mg tablet More than a month at Unknown time  Yes No   Sig: Take 40 mg by mouth daily at bedtime      Facility-Administered Medications: None         Objective   Vitals:Blood pressure 138/78, pulse 70, temperature 98 1 °F (36 7 °C), temperature source Temporal, resp  rate 20, height 5' 4" (1 626 m), weight 73 5 kg (162 lb 0 6 oz), SpO2 96 %  Physical Exam  General:  Patient is well-developed, well-nourished, and in no acute distress  HEENT:  Head normocephalic  Eyes anicteric  Cardiovascular:  With regular rhythm  No anterior neck bruits auscultated  Lungs:  Clear to auscultation  Abdomen:  Soft, nontender, with bowel sounds present  Extremities:  With no significant edema  Neurologic Exam  Mental Status:  Patient was alert, pleasantly interactive, and appropriately conversational   No obvious symbolic language difficulty or dysarthria, and the patient was fully oriented  Unremarkable spontaneous gait  Romberg maneuver performed unremarkably  Cranial Nerves:   II: Visual fields full to confrontation  Pupils equal, round, reactive to light with normal accomodation  III,IV,VI: extraocular muscles EOMI, no nystagmus  V: Sensation in the V1 through V3 distributions intact to pinprick and light touch bilaterally  VII: Face is symmetric with no weakness noted  VIII: Audition intact to finger rub bilaterally  IX/X: Uvula midline  Soft palate elevation symmetric  XII: Tongue midline with no atrophy or fasciculations with appropriate movement  Coordination: patient able to perform normal finger-to-nose and heel-to-shin without ataxia  Motor testing with full symmetrical strength throughout the 4 extremities with no upper extremity drift  Sensory testing grossly intact to pin, position throughout  Muscle stretch reflexes: 2+ throughout the upper and lower extremities bilaterally       Toe responses were downgoing bilaterally  Lab Results:   CBC:   Results from last 7 days  Lab Units 09/20/18  0456 09/20/18  0124   WBC Thousand/uL  --  5 70   RBC Million/uL  --  4 02   HEMOGLOBIN g/dL  --  13 0   HEMATOCRIT %  --  37 9   MCV fL  --  94   PLATELETS Thousands/uL 221 235   , BMP/CMP:   Results from last 7 days  Lab Units 09/20/18  0456 09/20/18  0124   SODIUM mmol/L 139 139   POTASSIUM mmol/L 4 0 3 6   CHLORIDE mmol/L 110* 108   CO2 mmol/L 22 24   BUN mg/dL 7 8   CREATININE mg/dL 0 70 0 73   CALCIUM mg/dL 8 9 9 3   AST U/L  --  42*   ALT U/L  --  23   ALK PHOS U/L  --  58   EGFR ml/min/1 73sq m 110 105   , TSH:   Results from last 7 days  Lab Units 09/20/18  0124   TSH 3RD GENERATON uIU/mL 4 540   , Drug Screen:   Results from last 7 days  Lab Units 09/20/18  0321   BARBITURATE UR  Negative   BENZODIAZEPINE UR  Positive*   THC UR  Negative   COCAINE UR  Negative   METHADONE URINE  Negative   OPIATE UR  Negative   PCP UR  Negative     Imaging Studies: I have personally reviewed pertinent reports  and I have personally reviewed pertinent films in PACS    Counseling / Coordination of Care  I spent a total of 40 min with the patient with greater than 50% of that time spent counseling and coordinating her care, specifically discussing her diagnosis, additional tests, and discussing the case with her care team, as detailed above

## 2018-09-20 NOTE — ASSESSMENT & PLAN NOTE
Patient advised to only take Xanax 0 5 mg once or twice daily  Continue Seroquel 100 mg daily  Discontinued Cymbalta as it is possibly the source of the orthostatic hypotension and syncopal episode that occurred yesterday  I also feel that she drank some alcohol yesterday in addition to taking her pills which made matters worse and she has been having some sleep walking episodes ever since the Cymbalta was started  Advised to follow up outpatient with her psychiatrist in 1 week

## 2018-09-20 NOTE — SOCIAL WORK
Patient under observation status for syncope  During admission pt was dx with bradycardia and recommended for holter monitor placement and f/u with Cardiology and Primary care which pt is not established with  Pt was medically cleared for discharge today  Observation notice signed by pt with original placed in scan bin  Due to pt's insurance being out of network with [facility], appts were scheduled with [facility] and for the monitor placement at [facility]  Pt to contact [address] [place] [address] for PCP appt  All information written on pt's AVS  Copy of prescription for monitor faxed to Central Scheduling at [phone]  Pt's niece to provide transportation for discharge home today

## 2018-09-20 NOTE — ASSESSMENT & PLAN NOTE
Patient initially had hypotension on admission  While she was in the emergency room her blood pressure dropped into the 60s and she received an IV fluid bolus  she was symptomatically bradycardic  It lasted for a few minutes and resolved with IV fluid bolus  She did not receive any other interventions  Hypotension has resolved completely and actually her blood pressure is high now  Will resume her blood pressure medications which she has been off for the last 6 months    Orthostatic vitals are negative

## 2018-09-20 NOTE — ASSESSMENT & PLAN NOTE
No results found for: HGBA1C    Recent Labs      09/20/18   0150   POCGLU  104*     Continue Accu-Cheks insulin sliding scale ADA diet  Hold metformin  Check hemoglobin A1c

## 2018-09-20 NOTE — ED NOTES
Patient reports drinking 3 beers tonight before going to bed after taking her usual medication  Patient denies chest pain, shortness of breath or any pain other than the right forehead  No visual changes  No c/o dizziness  No shortness of breath       Sruthi Bartlett RN  09/20/18 6832

## 2018-09-20 NOTE — ASSESSMENT & PLAN NOTE
Lisinopril 10 mg ,hydrochlorothiazide decreased to 12 5 ,amlodipine decreased to 5 mg daily  Re-evaluate of home dose antihypertensives upon discharge  Not on  beta-blocker

## 2018-09-20 NOTE — H&P
H&P- Farzana Paige 1960, 62 y o  female MRN: [id number]    Unit/Bed#: 7T [facility] 715-01 Encounter: [id number]    Primary Care Provider: No primary care provider on file     Date and time admitted to hospital: 9/20/2018  1:05 AM        * Syncope   Assessment & Plan    Status post syncope most likely due to bradycardia and drop in blood pressure , possible volume depletion anti psychotic and benzodiazepines synergistic effect    no seizure activity or head injury ,CT of the head negative for acute intracranial pathology  Patient risk of polypharmacy she is taking Cymbalta, Xanax and Seroquel  Will admit to telemetry on an observation basis  Monitor QT interval ( on EKG from the ER )  Serial cardiac enzymes  UDS positive for benzodiazepine  Fall precaution  Orthostatic vital signs ordered , however  patient has been given 2 5 L of normal saline in the emergency room  Will obtain echocardiogram to assess left ventricle function or wall wall motion abnormality  Will defer need of Cardiologyevaluation to the primary team  Neurology consult requested  Obtain Psychiatry consult to revise her meds  Monitor electrolytes, replaced as needed  Patient denies intentional drug overdose as a suicidal attempt        At risk for polypharmacy   Assessment & Plan    Management as above  Hold on Cymbalta ,Xanax and Seroquel        Hypotension   Assessment & Plan    Resolved with IV fluids  Amlodipine decreased to 5 mg and hydrochlorothiazide decreased to 12 5 mg daily   Re-evaluate home dose meds upon discharge        Bradycardia   Assessment & Plan    Not on beta-blocker, possible polypharmacy with antipsychotics   antipsychotics held        Essential hypertension   Assessment & Plan    Lisinopril 10 mg ,hydrochlorothiazide decreased to 12 5 ,amlodipine decreased to 5 mg daily  Re-evaluate of home dose antihypertensives upon discharge  Not on  beta-blocker        Other hyperlipidemia   Assessment & Plan    Will check lipid panel  Low-cholesterol diet  Continue statin        Acquired hypothyroidism   Assessment & Plan    TSH 4 5  Patient off Rx        Type 2 diabetes mellitus without complication, without long-term current use of insulin (HCC)   Assessment & Plan    No results found for: HGBA1C    Recent Labs      09/20/18   0150   POCGLU  104*     Continue Accu-Cheks insulin sliding scale ADA diet  Hold metformin  Check hemoglobin A1c              VTE Prophylaxis: Enoxaparin (Lovenox)  / sequential compression device   Code Status: full  POLST: There is no POLST form on file for this patient (pre-hospital)  Discussion with family:   Patient's   and bedside    Anticipated Length of Stay:  Patient will be admitted on an Observation basis with an anticipated length of stay of  < 2 midnights  Justification for Hospital Stay:   Neurology and psychiatry consult    Total Time for Visit, including Counseling / Coordination of Care: 45 minutes  Greater than 50% of this total time spent on direct patient counseling and coordination of care  Chief Complaint:     Syncope    History of Present Illness:    Florencia Sorto is a 62 y o  female with history of Psychiatry disorder on Cymbalta circ well Xanax p r n  hypertension on 3 antihypertensives drugs who presented from home for evaluation status post fall , syncopal episode  Patient  at bedside  participated to provide some details this patient is still not a good historian, slow in her responses and has been changing story  She was in her usual state of health until last night when she went to the bed,In the middle of the night she woke up , went to the bathroom and syncopized without any prodromal signs  She strike her head Her niece heard thud and rash to help patient was found unresponsive on the ground with clear mucus coming out of her mouth, patient regained consciousness after approximately 3 minutes    No observed seizure activity or postictal state, bladder or urinary incontinence post event     Blood glucose reported by   Upon arrival to the emergency room patient noted to have a significant drop in blood pressure the lowest 68/30 and heart rate in low 40s  Patient reports that she did not take heel antihypertensives for a while she took 1 Cymbalta and Seroquel prior syncope denied benzodiazepines however his urine drug screen positive for benzodiazepines  Patient denies any suicidal ideas or an intentional overdose  No history of seizure activities  Blood pressure markedly improved with IV hydration brought sinus bradycardia resolved  Troponin negative EKG showed sinus bradycardia   Urine drug screen positive for benzodiazepines  CT of the head negative for acute intracranial pathology  Patient admitted on an observation basis for psychiatry and neurology consult    Review of Systems:    Review of Systems   Constitutional: Positive for activity change  Past Medical and Surgical History:     Past Medical History:   Diagnosis Date    Chronic pain     back pain     Diabetes mellitus ([facility] [address] )     Disease of thyroid gland     hypothyroid     Hyperlipidemia     Hypertension     Psychiatric disorder        Past Surgical History:   Procedure Laterality Date    HYSTERECTOMY         Meds/Allergies:    Prior to Admission medications    Medication Sig Start Date End Date Taking?  Authorizing Provider   albuterol (PROVENTIL HFA,VENTOLIN HFA) 90 mcg/act inhaler Inhale 2 puffs every 6 (six) hours as needed for wheezing   Yes Historical Provider, MD   ALPRAZolam Elba Tatum) 0 5 mg tablet Take by mouth 3 (three) times a day as needed for anxiety   Yes Historical Provider, MD   amLODIPine (NORVASC) 10 mg tablet Take 10 mg by mouth daily   Yes Historical Provider, MD   DULoxetine (CYMBALTA) 30 mg delayed release capsule Take 20 mg by mouth daily   Yes Historical Provider, MD   hydrochlorothiazide (HYDRODIURIL) 25 mg tablet Take 25 mg by mouth daily   Yes Historical Provider, MD   metFORMIN (GLUCOPHAGE) 500 mg tablet Take 500 mg by mouth 2 (two) times a day with meals   Yes Historical Provider, MD   QUEtiapine (SEROquel) 100 mg tablet Take 100 mg by mouth daily at bedtime   Yes Historical Provider, MD   simvastatin (ZOCOR) 40 mg tablet Take 40 mg by mouth daily at bedtime   Yes Historical Provider, MD   enalapril (VASOTEC) 10 mg tablet Take 10 mg by mouth daily    Historical Provider, MD     I have reviewed home medications with a medical source (PCP, Pharmacy, other)  Allergies: Allergies   Allergen Reactions    Aspirin      Patient does not know       Social History:     Marital Status: /Civil Union   Occupation: none  Patient Pre-hospital Living Situation:  home  Patient Pre-hospital Level of Mobility:  reg  Patient Pre-hospital Diet Restrictions:  reg  Substance Use History:   History   Alcohol Use    1 8 oz/week    3 Cans of beer per week     History   Smoking Status    Current Every Day Smoker    Packs/day: 2 00    Types: Cigarettes   Smokeless Tobacco    Never Used     History   Drug Use No       Family History:    non-contributory    Physical Exam:     Vitals:   Blood Pressure: 147/92 (09/20/18 0502)  Pulse: 70 (09/20/18 0502)  Temperature: (!) 96 8 °F (36 °C) (09/20/18 0500)  Temp Source: Temporal (09/20/18 0500)  Respirations: 18 (09/20/18 0500)  Height: 5' 4" (162 6 cm) (09/20/18 0500)  Weight - Scale: 73 5 kg (162 lb 0 6 oz) (09/20/18 0500)  SpO2: 95 % (09/20/18 0500)    Physical Exam   Constitutional: No distress  HENT:   Head: Normocephalic  Eyes: Right eye exhibits no discharge  Left eye exhibits no discharge  Neck: Normal range of motion  Cardiovascular: Regular rhythm  Pulmonary/Chest: No respiratory distress  Abdominal: She exhibits distension  There is no tenderness  Musculoskeletal: She exhibits no edema  Neurological: She is alert  Skin: Skin is warm  Psychiatric: She has a normal mood and affect             Additional Data: Lab Results: I have personally reviewed pertinent reports  Results from last 7 days  Lab Units 09/20/18  0456 09/20/18  0124   WBC Thousand/uL  --  5 70   HEMOGLOBIN g/dL  --  13 0   HEMATOCRIT %  --  37 9   PLATELETS Thousands/uL 221 235   NEUTROS PCT %  --  51   LYMPHS PCT %  --  42   MONOS PCT %  --  6   EOS PCT %  --  1       Results from last 7 days  Lab Units 09/20/18  0456 09/20/18  0124   SODIUM mmol/L 139 139   POTASSIUM mmol/L 4 0 3 6   CHLORIDE mmol/L 110* 108   CO2 mmol/L 22 24   BUN mg/dL 7 8   CREATININE mg/dL 0 70 0 73   CALCIUM mg/dL 8 9 9 3   ALK PHOS U/L  --  58   ALT U/L  --  23   AST U/L  --  42*           Results from last 7 days  Lab Units 09/20/18  0618 09/20/18  0150   POC GLUCOSE mg/dl 89 104*           Imaging: I have personally reviewed pertinent reports  CTA ED chest PE study   Final Result by Josesito Tijerina DO (09/20 0234)      No evidence of pulmonary embolus      Increased density within the right mainstem bronchus which may represent mucous  Follow-up with pulmonary is recommended  Hilar lymphadenopathy  Follow-up is recommended  Subcentimeter axillary lymph nodes and nodular densities in the left breast   Recommend correlation with mammogram       The study was marked in EPIC for significant notification  Workstation performed: [id number]         CT head without contrast   Final Result by Mariana Hernández MD (09/20 0229)      No acute intracranial abnormality  Workstation performed: [id number]             EKG, Pathology, and Other Studies Reviewed on Admission:   · EKG:   Sinus bradycardia heart rate 52     Allscripts / Epic Records Reviewed: Yes     ** Please Note: This note has been constructed using a voice recognition system   **

## 2018-09-20 NOTE — DISCHARGE SUMMARY
Discharge- Bailee Byrne 1960, 62 y o  female MRN: [id number]    Unit/Bed#: 7T [facility] 715-01 Encounter: [id number]    Primary Care Provider: No primary care provider on file  Date and time admitted to hospital: 9/20/2018  1:05 AM        Bradycardia   Assessment & Plan    Patient has chronic sinus bradycardia  Heart rates in the 60s to 80s  Her heart rates increase with activity as a physiological response  Her TSH is normal   I will arrange for a Holter monitor to be set up upon discharge for 24-48 hours  2D echo result normal        * Syncope   Assessment & Plan    Secondary to alcohol use along with Cymbalta, Seroquel and Xanax use  The syncope seems to be potentiated by the Cymbalta in particular  Patient yesterday presented to the emergency room after a syncopal episode which was accompanied by bradycardia  She also has had an episode of hypotension and bradycardia while she was in the emergency room  I suspect it was secondary to Cymbalta as that is a medication that was started in the last 3 weeks it does have side effect of orthostatic hypotension and syncope  Patient is normally a hypertensive patient even here her blood pressures are in the 609X to 390 systolic and  diastolic  I have resumed her blood pressure medications however I have discontinued her Cymbalta  She will continue Seroquel and p r n  Ativan as before  I have advised her to follow up with her psychiatrist and also to establish care with a primary care doctor  I will see if I can place her on a Holter monitor prior to discharge  All of her EKGs show sinus bradycardia between 40-60 beats per minute with nonspecific T-wave changes in the lateral leads  2 d echo shows normal EF and no structural abnormalities  Clinically she is well and denies any complaints or symptoms chest pain or palpitations or skipped beats or dizziness or lightheadedness          Anxiety and depression   Assessment & Plan Patient advised to only take Xanax 0 5 mg once or twice daily  Continue Seroquel 100 mg daily  Discontinued Cymbalta as it is possibly the source of the orthostatic hypotension and syncopal episode that occurred yesterday  I also feel that she drank some alcohol yesterday in addition to taking her pills which made matters worse and she has been having some sleep walking episodes ever since the Cymbalta was started  Advised to follow up outpatient with her psychiatrist in 1 week  Essential hypertension   Assessment & Plan    Patient's blood pressure is uncontrolled  She has been off of her blood pressure medicines for over 6 months  She was on 3-4 agents together in the past   Will resume Norvasc 10 mg daily, HCTZ 12 5 mg twice daily and lisinopril 10 mg daily  Will try to get her set up outpatient with the family doctor and order a CMP to be done in 4 weeks  No orthostatic hypotension noted today  Hypotension   Assessment & Plan    Patient initially had hypotension on admission  While she was in the emergency room her blood pressure dropped into the 60s and she received an IV fluid bolus  she was symptomatically bradycardic  It lasted for a few minutes and resolved with IV fluid bolus  She did not receive any other interventions  Hypotension has resolved completely and actually her blood pressure is high now  Will resume her blood pressure medications which she has been off for the last 6 months  Orthostatic vitals are negative        Type 2 diabetes mellitus without complication, without long-term current use of insulin (HCC)resolved as of 9/20/2018   Assessment & Plan    Lab Results   Component Value Date    HGBA1C 5 5 09/20/2018       Recent Labs      09/20/18   0150  09/20/18   0618  09/20/18   1132   POCGLU  104*  89  110*       Blood Sugar Average: Last 72 hrs:  (P) 101   Patient is off metformin for over 6 months    Her blood sugars are well controlled and she does not require any metformin at this time        Acquired hypothyroidismresolved as of 9/20/2018   Assessment & Plan    Her TSH is normal   Patient is not on any thyroid replacement therapy  Other hyperlipidemiaresolved as of 9/20/2018   Assessment & Plan    Patient's cholesterol panel is normal despite being off of her statins for over 6 months  No need to place her on any statin therapy at this time  Add to syncope:  Patient had a EEG done which was normal   Also had a CT brain which was normal   Evaluated by Neurology would was normal   No neurological reasons for syncope found  Tobacco abuse:counselled on smoking cessation and nicotine replacement therapy  Discharging Physician / Practitioner: Josemanuel Castillo MD  PCP: No primary care provider on file  Admission Date:   Admission Orders     Ordered        09/20/18 0419  Place in Observation  Once             Discharge Date: 09/20/18    Resolved Problems  Date Reviewed: 9/20/2018          Resolved    Other hyperlipidemia 9/20/2018     Resolved by  Josemanuel Castillo MD    Acquired hypothyroidism 9/20/2018     Resolved by  Josemanuel Castillo MD    Type 2 diabetes mellitus without complication, without long-term current use of insulin ([facility] [address] ) 9/20/2018     Resolved by  Josemanuel Castillo MD          Consultations During Hospital Stay:  · neurology    Procedures Performed:     · eeg -normal    Significant Findings / Test Results:     · Sinus bradycardia    Incidental Findings:   · none     Test Results Pending at Discharge (will require follow up):   · none     Outpatient Tests Requested:  · cmp in 4 weeks    Complications:  none    Reason for Admission:   Passed out    Hospital Course: Florencia Sorto is a 62 y o  female patient who originally presented to the hospital on 9/20/2018 due to passing out at home  Patient was started on Cymbalta 3 weeks ago  Yesterday she had some alcohol and also took her medicines including Cymbalta, Seroquel and Xanax    As per hers spouse she has been walking in her sleep recently and yesterday she did again and passed out and was found by her niece  She is brought to the hospital found to be hypotensive and bradycardic and given fluid bolus following which she improved  Did not require any atropine  She has stayed in sinus bradycardia heart rates between 40s to 60s  She does physiologically improved with activity into the 70s to 80s  She has had elevated blood pressures here this morning she has been off of her medical meds for the last 6 months since she moved here from [place]  I have resumed her blood pressure medicines but will avoid any medicines which affect rate control  I have also ordered a Holter monitor for her  She had a 2D echo done which was normal   Also evaluated by Neurology and had a EEG done which was also normal   She has been advised not to use alcohol and also to completely eliminate Cymbalta  She has also been advised to establish care with a family doctor and also follow up with her psychiatrist in a week  Please note she does not drive    Please see above list of diagnoses and related plan for additional information  Condition at Discharge: good     Discharge Day Visit / Exam:     Subjective:  Patient denies any chest pain palpitations or skipped beats  She denies any headaches or shortness of breath  She feels well today  Vitals: Blood Pressure: (!) 157/101 (09/20/18 1235)  Pulse: 55 (09/20/18 1200)  Temperature: (!) 97 °F (36 1 °C) (09/20/18 1200)  Temp Source: Temporal (09/20/18 1200)  Respirations: 18 (09/20/18 1200)  Height: 5' 4" (162 6 cm) (09/20/18 0500)  Weight - Scale: 73 5 kg (162 lb 0 6 oz) (09/20/18 0500)  SpO2: 96 % (09/20/18 0900)  Exam:   Physical Exam   Constitutional: She is oriented to person, place, and time  She appears well-developed and well-nourished  HENT:   Head: Normocephalic and atraumatic     Right Ear: External ear normal    Left Ear: External ear normal    Mouth/Throat: Oropharynx is clear and moist    Eyes: Conjunctivae and EOM are normal  Pupils are equal, round, and reactive to light  Neck: Normal range of motion  Neck supple  Cardiovascular: Normal rate, regular rhythm, normal heart sounds and intact distal pulses  Pulmonary/Chest: Effort normal and breath sounds normal    Abdominal: Soft  Bowel sounds are normal  She exhibits no mass  There is no tenderness  There is no rebound and no guarding  Genitourinary:   Genitourinary Comments: deferred   Musculoskeletal: Normal range of motion  Neurological: She is alert and oriented to person, place, and time  She has normal reflexes  Skin: Skin is warm and dry  No rash noted  Psychiatric: She has a normal mood and affect  Nursing note and vitals reviewed  Discussion with Family:   Discussed with spouse at bedside about hospital course    Discharge instructions/Information to patient and family:   See after visit summary for information provided to patient and family  Provisions for Follow-Up Care:  See after visit summary for information related to follow-up care and any pertinent home health orders  Disposition:     Home    For Discharges to [facility]:   · Not Applicable to this Patient - Not Applicable to this Patient    Planned Readmission: none     Discharge Statement:  I spent 45 minutes discharging the patient  This time was spent on the day of discharge  I had direct contact with the patient on the day of discharge  Greater than 50% of the total time was spent examining patient, answering all patient questions, arranging and discussing plan of care with patient as well as directly providing post-discharge instructions  Additional time then spent on discharge activities  Discharge Medications:  See after visit summary for reconciled discharge medications provided to patient and family        ** Please Note: This note has been constructed using a voice recognition system **

## 2018-09-20 NOTE — ASSESSMENT & PLAN NOTE
Patient's cholesterol panel is normal despite being off of her statins for over 6 months  No need to place her on any statin therapy at this time

## 2018-09-20 NOTE — ASSESSMENT & PLAN NOTE
Resolved with IV fluids  Amlodipine decreased to 5 mg and hydrochlorothiazide decreased to 12 5 mg daily   Re-evaluate home dose meds upon discharge

## 2018-09-20 NOTE — NURSING NOTE
Pt arrived to floor with VSS  Pt reports dizziness upon standing  No CP or SOB noted  Pt on RA  SCDs on  AAOx3  Oriented to room and call bell system

## 2018-09-20 NOTE — ASSESSMENT & PLAN NOTE
Patient's blood pressure is uncontrolled  She has been off of her blood pressure medicines for over 6 months  She was on 3-4 agents together in the past   Will resume Norvasc 10 mg daily, HCTZ 12 5 mg twice daily and lisinopril 10 mg daily  Will try to get her set up outpatient with the family doctor and order a CMP to be done in 4 weeks  No orthostatic hypotension noted today

## 2018-09-20 NOTE — ED NOTES
Patient's vital signs were discharged from the monitor by unknown person while pt's primary nurse was in cat scan with patient  Dr Ana Maria Gage was present in patient's room(prior to cat scan)  at time that patient's heart rate dropped into the 40's (sinus ) and during this episode of bradycardia, patient was or became nauseous  Patient was lightheaded, BP had dropped initially to 79 systolic and shortly thereafter when repeated, systolic BP dropped to 68 - liter of IV fluids was hung with pressure bad, #2 IV site was started and #2 liter of IV fluids was hung per order of Dr Ana Maria Gage  Oxygen saturation also dropped to 85 - 86% and patient was placed on oxygen at 4 l nasal cannual with saturations up to 100%  Code cart was moved into room, patient was placed on pacer pads and atropine was placed at bedside with zofran given as ordered  Pt's BP came up to 611 systolically and remained in the 936 - 703'Y sytolically prior to going to cat scan  Patient's heart rate remained mostly in the 50's -60's  Accu check had also been done which was normal  Patient had gone to cat scan on monitor, with nurse and on oxygen       India Degroot RN  09/20/18 8101

## 2018-09-20 NOTE — ASSESSMENT & PLAN NOTE
Lab Results   Component Value Date    HGBA1C 5 5 09/20/2018       Recent Labs      09/20/18   0150  09/20/18   0618  09/20/18   1132   POCGLU  104*  89  110*       Blood Sugar Average: Last 72 hrs:  (P) 101   Patient is off metformin for over 6 months    Her blood sugars are well controlled and she does not require any metformin at this time

## 2018-09-20 NOTE — ED NOTES
Patient voided 650mls - urine specimen to lab as ordered  Dr Tanvir Moura in room to see patient  Oxygen removed and patient placed on room air with saturations remaining 97 -98%       Lora Almodovar RN  09/20/18 0757

## 2018-09-20 NOTE — ASSESSMENT & PLAN NOTE
Secondary to alcohol use along with Cymbalta, Seroquel and Xanax use  The syncope seems to be potentiated by the Cymbalta in particular  Patient yesterday presented to the emergency room after a syncopal episode which was accompanied by bradycardia  She also has had an episode of hypotension and bradycardia while she was in the emergency room  I suspect it was secondary to Cymbalta as that is a medication that was started in the last 3 weeks it does have side effect of orthostatic hypotension and syncope  Patient is normally a hypertensive patient even here her blood pressures are in the 540S to 961 systolic and  diastolic  I have resumed her blood pressure medications however I have discontinued her Cymbalta  She will continue Seroquel and p r n  Ativan as before  I have advised her to follow up with her psychiatrist and also to establish care with a primary care doctor  I will see if I can place her on a Holter monitor prior to discharge  All of her EKGs show sinus bradycardia between 40-60 beats per minute with nonspecific T-wave changes in the lateral leads  2 d echo shows normal EF and no structural abnormalities  Clinically she is well and denies any complaints or symptoms chest pain or palpitations or skipped beats or dizziness or lightheadedness

## 2018-09-20 NOTE — PLAN OF CARE
Problem: SAFETY ADULT  Goal: Maintain or return to baseline ADL function  INTERVENTIONS:  -  Assess patient's ability to carry out ADLs; assess patient's baseline for ADL function and identify physical deficits which impact ability to perform ADLs (bathing, care of mouth/teeth, toileting, grooming, dressing, etc )  - Assess/evaluate cause of self-care deficits   - Assess range of motion  - Assess patient's mobility; develop plan if impaired  - Assess patient's need for assistive devices and provide as appropriate  - Encourage maximum independence but intervene and supervise when necessary  ¯ Involve family in performance of ADLs  ¯ Assess for home care needs following discharge   ¯ Request OT consult to assist with ADL evaluation and planning for discharge  ¯ Provide patient education as appropriate  Outcome: Progressing    Goal: Maintain or return mobility status to optimal level  INTERVENTIONS:  - Assess patient's baseline mobility status (ambulation, transfers, stairs, etc )    - Identify cognitive and physical deficits and behaviors that affect mobility  - Identify mobility aids required to assist with transfers and/or ambulation (gait belt, sit-to-stand, lift, walker, cane, etc )  - [place] fall precautions as indicated by assessment  - Record patient progress and toleration of activity level on Mobility SBAR; progress patient to next Phase/Stage  - Instruct patient to call for assistance with activity based on assessment  - Request Rehabilitation consult to assist with strengthening/weightbearing, etc   Outcome: Progressing      Problem: DISCHARGE PLANNING  Goal: Discharge to home or other facility with appropriate resources  INTERVENTIONS:  - Identify barriers to discharge w/patient and caregiver  - Arrange for needed discharge resources and transportation as appropriate  - Identify discharge learning needs (meds, wound care, etc )  - Arrange for interpretive services to assist at discharge as needed  - Refer to Case Management Department for coordinating discharge planning if the patient needs post-hospital services based on physician/advanced practitioner order or complex needs related to functional status, cognitive ability, or social support system  Outcome: Progressing

## 2018-09-20 NOTE — NURSING NOTE
Patient is awake, alert, and oriented to person, place, and time  Denies any pain or shortness of breath  No dizziness  Vital signs are stable and telemetry shows sinus jian to sinus rhythm   remains at the bedside

## 2018-09-20 NOTE — PLAN OF CARE
DISCHARGE PLANNING     Discharge to home or other facility with appropriate resources Completed        Potential for Falls     Patient will remain free of falls Completed        SAFETY ADULT     Maintain or return to baseline ADL function Completed     Maintain or return mobility status to optimal level Completed        Plan completed prior to discharge

## 2018-09-20 NOTE — ED PROVIDER NOTES
History  Chief Complaint   Patient presents with    Syncope     EMS stated that pt was walking tot he bathroom when she passed out  EMS stated that pt was on the floor lethargic and diaphoretic  pt states that she hit her head  pt denies any chest pain or blurried vision  EMS got   pt states that she had 3 beers tonight prior to taking all her night time meds  80-year-old female past medical history hypertension, hyperlipidemia, depression, diabetes presents for evaluation of a syncopal event  Patient states that she was feeling at her baseline last night when she went to bed, she normally gets up multiple times a night to go to the bathroom because of the simple but that she takes and during 1 of those times she was walking in the kitchen and fell forward hitting her head possibly on the table  She had no preceding symptoms such as lightheadedness, chest pain, shortness of breath  Her niece heard the thought in came running immediately for help, she states that the patient was on the ground for approximately 3 minutes and had clear mucus coming out of her mouth  There was no tonic-clonic activity, no urinary or fecal incontinence  No similar episodes in the past   When patient woke up there was no confusion or postictal period  Currently she is resting comfortably without any complaints  Denies any recent medication changes denies any recent infectious symptoms     No known sick contacts  She is supposed to be on blood pressure medicines but has not been taking them for quite some time  She is normotensive in the emergency department  Patient states that she only takes Xanax, Cymbalta and Seroquel  Patient states there was a 2 day  This week where she ran out of her Seroquel and she restarted it yesterday evening at the same dosage  She also is not sure if she took her dose of Xanax prior to going to sleep  Denies taking any other medications or any extra doses of medications     She does state that she had 2 small beers prior to going to sleep  Patient appears clinically sober during my evaluation  Per friend present at bedside she had decreased p o  intake yesterday but otherwise was at her normal state of health  For and also states that she has some sort of a "heart history "but is not sure what this is" patient does not seem to be taking any of her cardiac medications and on her emergency contact form is listed only to have hypertension, hyperlipidemia for which she takes enalapril, atorvastatin, hydrochlorothiazide and amlodipine  Patient has a surgical history of hysterectomy  Prior to Admission Medications   Prescriptions Last Dose Informant Patient Reported? Taking?    ALPRAZolam (XANAX) 0 5 mg tablet 9/19/2018 at 2200  Yes Yes   Sig: Take by mouth 3 (three) times a day as needed for anxiety   DULoxetine (CYMBALTA) 30 mg delayed release capsule 9/19/2018 at 2200  Yes Yes   Sig: Take 20 mg by mouth daily   QUEtiapine (SEROquel) 100 mg tablet 9/19/2018 at 2200  Yes Yes   Sig: Take 100 mg by mouth daily at bedtime   albuterol (PROVENTIL HFA,VENTOLIN HFA) 90 mcg/act inhaler   Yes Yes   Sig: Inhale 2 puffs every 6 (six) hours as needed for wheezing   amLODIPine (NORVASC) 10 mg tablet More than a month at Unknown time  Yes No   Sig: Take 10 mg by mouth daily   enalapril (VASOTEC) 10 mg tablet More than a month at Unknown time Self Yes No   Sig: Take 10 mg by mouth daily   hydrochlorothiazide (HYDRODIURIL) 25 mg tablet More than a month at Unknown time  Yes No   Sig: Take 25 mg by mouth daily   metFORMIN (GLUCOPHAGE) 500 mg tablet More than a month at Unknown time  Yes No   Sig: Take 500 mg by mouth 2 (two) times a day with meals   simvastatin (ZOCOR) 40 mg tablet More than a month at Unknown time  Yes No   Sig: Take 40 mg by mouth daily at bedtime      Facility-Administered Medications: None       Past Medical History:   Diagnosis Date    Chronic pain     back pain     Diabetes mellitus ([facility] [address] )     Disease of thyroid gland     hypothyroid     Hyperlipidemia     Hypertension     Psychiatric disorder        Past Surgical History:   Procedure Laterality Date    HYSTERECTOMY         History reviewed  No pertinent family history  I have reviewed and agree with the history as documented  Social History   Substance Use Topics    Smoking status: Current Every Day Smoker     Packs/day: 2 00     Types: Cigarettes    Smokeless tobacco: Never Used    Alcohol use 1 8 oz/week     3 Cans of beer per week        Review of Systems   Constitutional: Negative for appetite change and fever  HENT: Negative for rhinorrhea and sore throat  Eyes: Negative for photophobia and visual disturbance  Respiratory: Negative for cough, chest tightness and wheezing  Cardiovascular: Negative for chest pain, palpitations and leg swelling  Gastrointestinal: Negative for abdominal distention, abdominal pain, blood in stool, constipation and diarrhea  Genitourinary: Negative for dysuria, flank pain, frequency, hematuria and urgency  Musculoskeletal: Negative for back pain  Skin: Negative for rash  Neurological: Positive for syncope and light-headedness  Negative for dizziness, weakness and headaches  All other systems reviewed and are negative  Physical Exam  Physical Exam   Constitutional: She is oriented to person, place, and time  She appears well-developed and well-nourished  HENT:   Head: Normocephalic and atraumatic  Eyes: EOM are normal  Pupils are equal, round, and reactive to light  Neck: Normal range of motion  Neck supple  Cardiovascular: Normal rate and regular rhythm  Exam reveals no gallop and no friction rub  No murmur heard  Pulmonary/Chest: Effort normal  She has no wheezes  She has no rales  She exhibits no tenderness  Abdominal: Soft  She exhibits no distension and no mass  There is no rebound and no guarding     Neurological: She is alert and oriented to person, place, and time  Nonfocal neurologic exam including no dysmetria, visual fields full by confrontation, fluent and clear speech, muscle strength 5/5 bilateral upper lower extremity, sensory intact    Skin: Skin is warm and dry  Psychiatric: She has a normal mood and affect  Nursing note and vitals reviewed        Vital Signs  ED Triage Vitals [09/20/18 0112]   Temperature Pulse Respirations Blood Pressure SpO2   97 6 °F (36 4 °C) 55 18 137/79 98 %      Temp Source Heart Rate Source Patient Position - Orthostatic VS BP Location FiO2 (%)   Temporal Monitor Sitting Left arm --      Pain Score       No Pain           Vitals:    09/20/18 0400 09/20/18 0500 09/20/18 0501 09/20/18 0502   BP: 135/81 153/90 163/92 147/92   Pulse: 68 57 71 70   Patient Position - Orthostatic VS: Lying Lying - Orthostatic VS Sitting - Orthostatic VS Standing - Orthostatic VS       Visual Acuity  Visual Acuity      Most Recent Value   L Pupil Size (mm)  3   R Pupil Size (mm)  3          ED Medications  Medications   sodium chloride 0 9 % infusion (75 mL/hr Intravenous Rate/Dose Change 9/20/18 0559)   albuterol (PROVENTIL HFA,VENTOLIN HFA) inhaler 2 puff (not administered)   lisinopril (ZESTRIL) tablet 10 mg (not administered)   pravastatin (PRAVACHOL) tablet 80 mg (not administered)   docusate sodium (COLACE) capsule 100 mg (not administered)   senna (SENOKOT) tablet 8 6 mg (not administered)   ondansetron (ZOFRAN) injection 4 mg (not administered)   nicotine (NICODERM CQ) 14 mg/24hr TD 24 hr patch 1 patch (not administered)   enoxaparin (LOVENOX) subcutaneous injection 40 mg (not administered)   insulin lispro (HumaLOG) 100 units/mL subcutaneous injection 1-6 Units (not administered)   insulin lispro (HumaLOG) 100 units/mL subcutaneous injection 1-6 Units (not administered)   acetaminophen (TYLENOL) tablet 650 mg (not administered)   hydrochlorothiazide (HYDRODIURIL) tablet 12 5 mg (not administered)   amLODIPine (NORVASC) tablet 5 mg (not administered)   ondansetron (ZOFRAN) 4 mg/2 mL injection **AcuDose Override Pull** (4 mg  Given 9/20/18 0205)   iohexol (OMNIPAQUE) 350 MG/ML injection (MULTI-DOSE) 85 mL (85 mL Intravenous Given 9/20/18 0223)   sodium chloride 0 9 % bolus 1,000 mL (0 mL Intravenous Stopped 9/20/18 0312)   sodium chloride 0 9 % bolus 1,000 mL (0 mL Intravenous Stopped 9/20/18 0312)       Diagnostic Studies  Results Reviewed     Procedure Component Value Units Date/Time    Lipid Panel with Direct LDL reflex [[id number]]  (Normal) Collected:  09/20/18 0456    Lab Status:  Final result Specimen:  Blood from Arm, Left Updated:  09/20/18 0616     Cholesterol 165 mg/dL      Triglycerides 111 mg/dL      HDL, Direct 43 mg/dL      LDL Calculated 100 mg/dL     Hepatic function panel [[id number]]  (Abnormal) Collected:  09/20/18 0124    Lab Status:  Final result Specimen:  Blood from Arm, Right Updated:  09/20/18 0615     Total Bilirubin 0 50 mg/dL      Bilirubin, Direct 0 10 mg/dL      Alkaline Phosphatase 58 U/L      AST 42 (H) U/L      ALT 23 U/L      Total Protein 7 0 g/dL      Albumin 4 0 g/dL     Troponin I [[id number]]  (Normal) Collected:  09/20/18 0457    Lab Status:  Final result Specimen:  Blood from Arm, Left Updated:  09/20/18 0532     Troponin I <0 01 ng/mL     Phosphorus [[id number]]  (Normal) Collected:  09/20/18 0456    Lab Status:  Final result Specimen:  Blood from Arm, Left Updated:  09/20/18 0519     Phosphorus 3 6 mg/dL     Magnesium [[id number]]  (Normal) Collected:  09/20/18 0456    Lab Status:  Final result Specimen:  Blood from Arm, Left Updated:  09/20/18 0519     Magnesium 1 8 mg/dL     Basic metabolic panel [[id number]]  (Abnormal) Collected:  09/20/18 0456    Lab Status:  Final result Specimen:  Blood from Arm, Left Updated:  09/20/18 0519     Sodium 139 mmol/L      Potassium 4 0 mmol/L      Chloride 110 (H) mmol/L      CO2 22 mmol/L      ANION GAP 7 mmol/L      BUN 7 mg/dL      Creatinine 0 70 mg/dL      Glucose 85 mg/dL      Calcium 8 9 mg/dL      eGFR 110 ml/min/1 73sq m     Narrative:         National Kidney Disease Education Program recommendations are as follows:  GFR calculation is accurate only with a steady state creatinine  Chronic Kidney disease less than 60 ml/min/1 73 sq  meters  Kidney failure less than 15 ml/min/1 73 sq  meters  Platelet count [[id number]]  (Normal) Collected:  09/20/18 0456    Lab Status:  Final result Specimen:  Blood from Arm, Left Updated:  09/20/18 0506     Platelets 693 Thousands/uL     Hemoglobin A1c w/EAG Estimation (Orders if not completed within the last 90 days) [[id number]] Collected:  09/20/18 0456    Lab Status: In process Specimen:  Blood from Arm, Left Updated:  09/20/18 0502    Rapid drug screen, urine [[id number]]  (Abnormal) Collected:  09/20/18 0321    Lab Status:  Final result Specimen:  Urine from Urine, Other Updated:  09/20/18 0350     Amph/Meth UR Negative     Barbiturate Ur Negative     Benzodiazepine Urine Positive (A)     Cocaine Urine Negative     Methadone Urine Negative     Opiate Urine Negative     PCP Ur Negative     THC Urine Negative    Narrative:         Presumptive report  If requested, specimen will be sent to reference lab for confirmation  FOR MEDICAL PURPOSES ONLY  IF CONFIRMATION NEEDED PLEASE CONTACT THE LAB WITHIN 5 DAYS      Drug Screen Cutoff Levels:  AMPHETAMINE/METHAMPHETAMINES  1000 ng/mL  BARBITURATES     200 ng/mL  BENZODIAZEPINES     200 ng/mL  COCAINE      300 ng/mL  METHADONE      300 ng/mL  OPIATES      300 ng/mL  PHENCYCLIDINE     25 ng/mL  THC       50 ng/mL    Ethanol [[id number]]  (Normal) Collected:  09/20/18 0331    Lab Status:  Final result Specimen:  Blood Updated:  09/20/18 0345     Ethanol Lvl <77 mg/dL     Salicylate level [[id number]]  (Abnormal) Collected:  09/20/18 0331    Lab Status:  Final result Specimen:  Blood Updated:  50/42/15 7808     Salicylate Lvl <5 3 (L) mg/dL     Acetaminophen level [[id number]] (Abnormal) Collected:  09/20/18 0331    Lab Status:  Final result Specimen:  Blood Updated:  09/20/18 0345     Acetaminophen Level <10 (L) ug/mL     UA w Reflex to Microscopic w Reflex to Culture [[id number]]  (Abnormal) Collected:  09/20/18 0327    Lab Status:  Final result Specimen:  Urine Updated:  09/20/18 0339     Color, UA Straw     Clarity, UA Slightly Cloudy (A)     Specific Gravity, UA 1 010     pH, UA 5 0     Leukocytes, UA Negative     Nitrite, UA Negative     Protein, UA Negative mg/dl      Glucose, UA Negative mg/dl      Ketones, UA Negative mg/dl      Bilirubin, UA Negative     Blood, UA Negative     UROBILINOGEN UA Negative mg/dL     TSH, 3rd generation with Free T4 reflex [[id number]]  (Normal) Collected:  09/20/18 0124    Lab Status:  Final result Specimen:  Blood from Arm, Right Updated:  09/20/18 0255     TSH 3RD GENERATON 4 540 uIU/mL     Narrative:         Patients undergoing fluorescein dye angiography may retain small amounts of fluorescein in the body for 48-72 hours post procedure  Samples containing fluorescein can produce falsely depressed TSH values  If the patient had this procedure,a specimen should be resubmitted post fluorescein clearance            The recommended reference ranges for TSH during pregnancy are as follows:  First trimester 0 1 to 2 5 uIU/mL  Second trimester  0 2 to 3 0 uIU/mL  Third trimester 0 3 to 3 0 uIU/m      Fingerstick Glucose (POCT) [[id number]]  (Abnormal) Collected:  09/20/18 0150    Lab Status:  Final result Updated:  09/20/18 0200     POC Glucose 104 (H) mg/dl     Troponin I [[id number]]  (Normal) Collected:  09/20/18 0124    Lab Status:  Final result Specimen:  Blood from Arm, Right Updated:  09/20/18 0153     Troponin I <0 01 ng/mL     Narrative:       Hemolysis    Magnesium [[id number]]  (Normal) Collected:  09/20/18 0124    Lab Status:  Final result Specimen:  Blood from Arm, Right Updated:  09/20/18 0142     Magnesium 1 8 mg/dL     Basic metabolic panel [[id number]]  (Abnormal) Collected:  09/20/18 0124    Lab Status:  Final result Specimen:  Blood from Arm, Right Updated:  09/20/18 0142     Sodium 139 mmol/L      Potassium 3 6 mmol/L      Chloride 108 mmol/L      CO2 24 mmol/L      ANION GAP 7 mmol/L      BUN 8 mg/dL      Creatinine 0 73 mg/dL      Glucose 117 (H) mg/dL      Calcium 9 3 mg/dL      eGFR 105 ml/min/1 73sq m     Narrative:         National Kidney Disease Education Program recommendations are as follows:  GFR calculation is accurate only with a steady state creatinine  Chronic Kidney disease less than 60 ml/min/1 73 sq  meters  Kidney failure less than 15 ml/min/1 73 sq  meters  CBC and differential [[id number]]  (Abnormal) Collected:  09/20/18 0124    Lab Status:  Final result Specimen:  Blood from Arm, Right Updated:  09/20/18 0142     WBC 5 70 Thousand/uL      RBC 4 02 Million/uL      Hemoglobin 13 0 g/dL      Hematocrit 37 9 %      MCV 94 fL      MCH 32 3 pg      MCHC 34 2 g/dL      RDW 15 9 (H) %      MPV 9 2 fL      Platelets 955 Thousands/uL      Neutrophils Relative 51 %      Lymphocytes Relative 42 %      Monocytes Relative 6 %      Eosinophils Relative 1 %      Basophils Relative 1 %      Neutrophils Absolute 2 90 Thousands/µL      Lymphocytes Absolute 2 40 Thousands/µL      Monocytes Absolute 0 30 Thousand/µL      Eosinophils Absolute 0 00 Thousand/µL      Basophils Absolute 0 00 Thousands/µL                  CTA ED chest PE study   Final Result by Chavez Tejada DO (09/20 0234)      No evidence of pulmonary embolus      Increased density within the right mainstem bronchus which may represent mucous  Follow-up with pulmonary is recommended  Hilar lymphadenopathy  Follow-up is recommended  Subcentimeter axillary lymph nodes and nodular densities in the left breast   Recommend correlation with mammogram       The study was marked in EPIC for significant notification                    Workstation performed: [id number]         CT head without contrast   Final Result by Sophia Peng MD (09/20 0253)      No acute intracranial abnormality  Workstation performed: [id number]                    Procedures  CriticalCare Time  Performed by: Rana Koyanagi  Authorized by: Rana Koyanagi     Critical care provider statement:     Critical care time (minutes):  39    Critical care start time:  9/20/2018 1:30 AM    Critical care time was exclusive of:  Separately billable procedures and treating other patients and teaching time    Critical care was necessary to treat or prevent imminent or life-threatening deterioration of the following conditions:  Shock, respiratory failure and circulatory failure    Critical care was time spent personally by me on the following activities:  Obtaining history from patient or surrogate, development of treatment plan with patient or surrogate, evaluation of patient's response to treatment, ordering and performing treatments and interventions, ordering and review of laboratory studies, ordering and review of radiographic studies and re-evaluation of patient's condition    I assumed direction of critical care for this patient from another provider in my specialty: no    Comments:      Patient became bradycardic, hypotensive, altered, hypoxic requiring bolusing of fluids, supplemental oxygen administration    Patient was placed on defibrillation pads             Phone Contacts  ED Phone Contact    ED Course  ED Course as of Sep 20 0626   Thu Sep 20, 2018   0120 Procedure Note: EKG  Date/Time: 09/20/18 1:20 AM   Performed by: Rana Koyanagi  Authorized by: Rana Koyanagi  Indications / Diagnosis: Syncope  ECG reviewed by me, the ED Provider: yes   The EKG demonstrates:  Rhythm:   normal sinus  Intervals:   normal intervals  Axis: normal axis  QRS/Blocks: normal QRS  ST Changes: Inverted T waves diffusely, no acute ST elevations  Compared to previous no changes        0210 Called into the room as the patient dropped heart rate 241 and was symptomatic, on recycling the pressure is now 68 systolic, patient nauseous and states she feels chest pressure  Code cart in the room and patient placed on pads, atropine order to bedside  Patient given rapid infusion of 1 L of fluids, 4 mg of Zofran  During this episode the patient became hypoxic to 86 on room air without any complaints of shortness of breath, patient placed on nasal cannula at 3 L with improvement to 100%    After intervention her heart rate improved to the 60s and blood pressure is now 450 systolic, symptoms improved  Repeat EKG without any changes  Blood work unremarkable so far patient will be taken to CT scan for CT head and CTA PE protocol of the chest to rule out PE as the cause of her hypotension    0256 Internal medicine team paged for admission    0256 Patient remains stable at this time    0310 Spoke to Internal Medicine doctor, Hali Hernandez, discussed patient presentation, it appears that the vital signs during the patient's hypotensive episodes did not transfer because of a issue with the monitor in the patient's room  Attempting to at the vitals to the chart  IM suggests Toxicology consult as patient's symptoms may be caused by her medications     Will observe patient in the emergency department for 2 hours, repeat EKG, place not urgent Toxicology consult and if patient remains stable will admit patient for further monitoring upstairs                                MDM  Number of Diagnoses or Management Options  Diagnosis management comments: 24-year-old female presents for evaluation of a syncopal episode, unclear etiology however will obtain an EKG, electrolytes, troponin, CT head, will likely admit patient for further care    CritCare Time    Disposition  Final diagnoses:   Hypotension   Bradycardia   Hypoxia   Syncope   Altered mental status     Time reflects when diagnosis was documented in both MDM as applicable and the Disposition within this note Time User Action Codes Description Comment    9/20/2018  3:06 AM Orvilla Beth Add [I95 9] Hypotension     9/20/2018  3:06 AM Orvilla Beth Add [R00 1] Bradycardia     9/20/2018  3:11 AM Orvilla Beth Add [R09 02] Hypoxia     9/20/2018  3:11 AM Orvilla Beth Add [R55] Syncope     9/20/2018  3:11 AM Orvilla Beth Add [R41 82] Altered mental status     9/20/2018  4:17 AM Vernia Goltz Add [Z91 89] At risk for polypharmacy       ED Disposition     None      Follow-up Information    None         Current Discharge Medication List      CONTINUE these medications which have NOT CHANGED    Details   albuterol (PROVENTIL HFA,VENTOLIN HFA) 90 mcg/act inhaler Inhale 2 puffs every 6 (six) hours as needed for wheezing      ALPRAZolam (XANAX) 0 5 mg tablet Take by mouth 3 (three) times a day as needed for anxiety      DULoxetine (CYMBALTA) 30 mg delayed release capsule Take 20 mg by mouth daily      QUEtiapine (SEROquel) 100 mg tablet Take 100 mg by mouth daily at bedtime      amLODIPine (NORVASC) 10 mg tablet Take 10 mg by mouth daily      enalapril (VASOTEC) 10 mg tablet Take 10 mg by mouth daily      hydrochlorothiazide (HYDRODIURIL) 25 mg tablet Take 25 mg by mouth daily      metFORMIN (GLUCOPHAGE) 500 mg tablet Take 500 mg by mouth 2 (two) times a day with meals      simvastatin (ZOCOR) 40 mg tablet Take 40 mg by mouth daily at bedtime           No discharge procedures on file      ED Provider  Electronically Signed by           Shukri Waller MD  09/20/18 5945

## 2018-09-20 NOTE — ASSESSMENT & PLAN NOTE
Patient has chronic sinus bradycardia  Heart rates in the 60s to 80s  Her heart rates increase with activity as a physiological response  Her TSH is normal   I will arrange for a Holter monitor to be set up upon discharge for 24-48 hours    2D echo result normal

## 2018-09-20 NOTE — ASSESSMENT & PLAN NOTE
Status post syncope most likely due to bradycardia and drop in blood pressure , possible volume depletion anti psychotic and benzodiazepines synergistic effect    no seizure activity or head injury ,CT of the head negative for acute intracranial pathology  Patient risk of polypharmacy she is taking Cymbalta, Xanax and Seroquel  Will admit to telemetry on an observation basis  Monitor QT interval ( on EKG from the ER )  Serial cardiac enzymes  UDS positive for benzodiazepine  Fall precaution  Orthostatic vital signs ordered , however  patient has been given 2 5 L of normal saline in the emergency room  Will obtain echocardiogram to assess left ventricle function or wall wall motion abnormality  Will defer need of Cardiologyevaluation to the primary team  Neurology consult requested  Obtain Psychiatry consult to revise her meds  Monitor electrolytes, replaced as needed  Patient denies intentional drug overdose as a suicidal attempt

## 2019-11-04 ENCOUNTER — HOSPITAL ENCOUNTER (EMERGENCY)
Facility: HOSPITAL | Age: 59
Discharge: HOME | End: 2019-11-04
Attending: EMERGENCY MEDICINE | Admitting: EMERGENCY MEDICINE
Payer: COMMERCIAL

## 2019-11-04 VITALS
OXYGEN SATURATION: 99 % | WEIGHT: 167.77 LBS | RESPIRATION RATE: 14 BRPM | DIASTOLIC BLOOD PRESSURE: 106 MMHG | SYSTOLIC BLOOD PRESSURE: 155 MMHG | HEART RATE: 68 BPM | TEMPERATURE: 97.1 F | BODY MASS INDEX: 28.8 KG/M2

## 2019-11-04 DIAGNOSIS — M25.512 SHOULDER PAIN, LEFT: Primary | ICD-10-CM

## 2019-11-04 DIAGNOSIS — M54.12 CERVICAL RADICULOPATHY: ICD-10-CM

## 2019-11-04 PROCEDURE — 99284 EMERGENCY DEPT VISIT MOD MDM: CPT | Performed by: EMERGENCY MEDICINE

## 2019-11-04 PROCEDURE — 99283 EMERGENCY DEPT VISIT LOW MDM: CPT

## 2019-11-04 RX ORDER — OXYCODONE HYDROCHLORIDE AND ACETAMINOPHEN 5; 325 MG/1; MG/1
1 TABLET ORAL EVERY 6 HOURS PRN
Qty: 20 TABLET | Refills: 0 | Status: SHIPPED | OUTPATIENT
Start: 2019-11-04 | End: 2019-11-14

## 2019-11-04 RX ORDER — HYDROCHLOROTHIAZIDE 25 MG/1
12.5 TABLET ORAL DAILY
Qty: 20 TABLET | Refills: 0 | OUTPATIENT
Start: 2019-11-04 | End: 2019-12-17

## 2019-11-04 RX ORDER — OXYCODONE HYDROCHLORIDE AND ACETAMINOPHEN 5; 325 MG/1; MG/1
1 TABLET ORAL ONCE
Status: COMPLETED | OUTPATIENT
Start: 2019-11-04 | End: 2019-11-04

## 2019-11-04 RX ORDER — METHOCARBAMOL 500 MG/1
1000 TABLET, FILM COATED ORAL ONCE
Status: COMPLETED | OUTPATIENT
Start: 2019-11-04 | End: 2019-11-04

## 2019-11-04 RX ORDER — AMLODIPINE BESYLATE 10 MG/1
10 TABLET ORAL DAILY
Qty: 20 TABLET | Refills: 0 | OUTPATIENT
Start: 2019-11-04 | End: 2019-12-17

## 2019-11-04 RX ORDER — IBUPROFEN 600 MG/1
600 TABLET ORAL ONCE
Status: COMPLETED | OUTPATIENT
Start: 2019-11-04 | End: 2019-11-04

## 2019-11-04 RX ORDER — ENALAPRIL MALEATE 10 MG/1
10 TABLET ORAL DAILY
Qty: 20 TABLET | Refills: 0 | OUTPATIENT
Start: 2019-11-04 | End: 2019-12-17

## 2019-11-04 RX ORDER — METHOCARBAMOL 750 MG/1
750 TABLET, FILM COATED ORAL 3 TIMES DAILY
Qty: 30 TABLET | Refills: 0 | OUTPATIENT
Start: 2019-11-04 | End: 2019-11-06

## 2019-11-04 RX ADMIN — IBUPROFEN 600 MG: 600 TABLET, FILM COATED ORAL at 07:46

## 2019-11-04 RX ADMIN — OXYCODONE HYDROCHLORIDE AND ACETAMINOPHEN 1 TABLET: 5; 325 TABLET ORAL at 07:46

## 2019-11-04 RX ADMIN — METHOCARBAMOL 1000 MG: 500 TABLET, FILM COATED ORAL at 07:46

## 2019-11-04 NOTE — ED NOTES
Provider notified of pt stating that she also has a headache which she has all the time  Pt states she has been off her bp and psych meds for about 1 year because she took herself off of them  States that "they are out to get me"    States she was taken off of her diabetes meds by her provider     Azul Conde RN  11/04/19 9798

## 2019-11-04 NOTE — ED PROVIDER NOTES
History  Chief Complaint   Patient presents with    Shoulder Pain     staets she has arthritis in her neck and back which she now feels is in her left shoulder for the past 3 days  last took tylenol at about 0500       History provided by:  Patient  Shoulder Pain   Location:  Shoulder  Shoulder location:  L shoulder  Injury: no    Pain details:     Quality:  Aching and cramping    Radiates to:  Does not radiate    Severity:  Moderate    Onset quality:  Gradual    Timing:  Intermittent    Progression:  Waxing and waning  Relieved by:  Nothing  Worsened by:  Nothing  Ineffective treatments:  None tried  Associated symptoms: decreased range of motion, neck pain, stiffness and tingling    Associated symptoms: no fever        None       Past Medical History:   Diagnosis Date    Anxiety     Chronic pain     back pain     Depression     Diabetes mellitus ([facility] [address] )     Disease of thyroid gland     hypothyroid     Hyperlipidemia     Hypertension     Psychiatric disorder        Past Surgical History:   Procedure Laterality Date    DENTAL SURGERY      HYSTERECTOMY         History reviewed  No pertinent family history  I have reviewed and agree with the history as documented  Social History     Tobacco Use    Smoking status: Current Every Day Smoker     Packs/day: 2 00     Types: Cigarettes    Smokeless tobacco: Never Used   Substance Use Topics    Alcohol use: Yes     Alcohol/week: 3 0 standard drinks     Types: 3 Cans of beer per week    Drug use: No        Review of Systems   Constitutional: Negative for chills and fever  HENT: Negative for rhinorrhea, sore throat and trouble swallowing  Eyes: Negative for pain  Respiratory: Negative for cough, shortness of breath, wheezing and stridor  Cardiovascular: Negative for chest pain and leg swelling  Gastrointestinal: Negative for abdominal pain, diarrhea and nausea  Endocrine: Negative for polyuria     Genitourinary: Negative for dysuria, flank pain and urgency  Musculoskeletal: Positive for neck pain and stiffness  Negative for joint swelling, myalgias and neck stiffness  Skin: Negative for rash  Allergic/Immunologic: Negative for immunocompromised state  Neurological: Negative for dizziness, syncope, weakness, numbness and headaches  Psychiatric/Behavioral: Negative for confusion and suicidal ideas  All other systems reviewed and are negative  Physical Exam  Physical Exam   Constitutional: She is oriented to person, place, and time  She appears well-developed and well-nourished  HENT:   Head: Normocephalic and atraumatic  Eyes: Pupils are equal, round, and reactive to light  EOM are normal    Neck: Normal range of motion  Neck supple  Cardiovascular: Normal rate and regular rhythm  Exam reveals no friction rub  No murmur heard  Pulmonary/Chest: Breath sounds normal  No respiratory distress  She has no wheezes  She has no rales  Abdominal: Soft  Bowel sounds are normal  She exhibits no distension  There is no tenderness  Musculoskeletal: Normal range of motion  She exhibits tenderness  She exhibits no edema  Left cervical muscular skeletal tenderness, a rhomboid tenderness, spasm, hypertrophy in that area  Neurological: She is alert and oriented to person, place, and time  Skin: Skin is warm  No rash noted  Psychiatric: She has a normal mood and affect  Nursing note and vitals reviewed        Vital Signs  ED Triage Vitals [11/04/19 0732]   Temperature Pulse Respirations Blood Pressure SpO2   (!) 97 1 °F (36 2 °C) 76 16 (!) 183/104 99 %      Temp Source Heart Rate Source Patient Position - Orthostatic VS BP Location FiO2 (%)   Tympanic Monitor Sitting Left arm --      Pain Score       Worst Possible Pain           Vitals:    11/04/19 0732 11/04/19 0825   BP: (!) 183/104 (!) 155/106   Pulse: 76 68   Patient Position - Orthostatic VS: Sitting Sitting         Visual Acuity      ED Medications  Medications oxyCODONE-acetaminophen (PERCOCET) 5-325 mg per tablet 1 tablet (1 tablet Oral Given 11/4/19 0746)   methocarbamol (ROBAXIN) tablet 1,000 mg (1,000 mg Oral Given 11/4/19 0746)   ibuprofen (MOTRIN) tablet 600 mg (600 mg Oral Given 11/4/19 0746)       Diagnostic Studies  Results Reviewed     None                 No orders to display              Procedures  Procedures       ED Course                               MDM  Number of Diagnoses or Management Options  Cervical radiculopathy: new and requires workup  Shoulder pain, left: new and requires workup  Diagnosis management comments: This is a 59-year-old female who presents emergency department with symptoms of left scapular left shoulder and left arm radiculopathy numbness tingling and discomfort  The patient has chronic problems with her cervical spine has been followed up as an outpatient with that before with no improvement of symptoms  She did not take any medications for pain at this point time her symptoms have been present for the last 3 days there is no traumatic injury  She has good strength  strength noted in the left hand and bilateral hands  There is noted objective numbness and tingling noted  Patient has full range of motion of the shoulder with no difficulty  No indication for x-rays no traumatic injury the plan will be for outpatient management followup given strict instructions when to return back to the emergency department muscle relaxer and medications given for pain  Pt re-examined and evaluated after testing and treatment  Spoke with the patient and feeling improved and sxs have resolved  Will discharge home with close f/u with pcp and instructed to return to the ED if sxs worsen or continue  Pt agrees with the plan for discharge and feels comfortable to go home with proper f/u  Advised to return for worsening or additional problems  Diagnostic tests were reviewed and questions answered   Diagnosis, care plan and treatment options were discussed  The patient understand instructions and will follow up as directed  Counseling: I had a detailed discussion with the patient and/or guardian regarding: the historical points, exam findings, and any diagnostic results supporting the discharge diagnosis, lab results, radiology results, discharge instructions reviewed with patient and/or family/caregiver and understanding was verbalized  Instructions given to return to the emergency department if symptoms worsen or persist, or if there are any questions or concerns that arise at home  All labs reviewed and utilized in the medical decision making process    All radiology studies independently viewed by me and interpreted by the radiologist       Disposition  Final diagnoses:   Shoulder pain, left   Cervical radiculopathy     Time reflects when diagnosis was documented in both MDM as applicable and the Disposition within this note     Time User Action Codes Description Comment    11/4/2019  8:15 AM Dexter Rogers [M25 512] Shoulder pain, left     11/4/2019  8:15 AM Dexter Rogers [V99 15] Cervical radiculopathy       ED Disposition     ED Disposition Condition Date/Time Comment    Discharge Stable Mon Nov 4, 2019  8:15 AM Carol Varghese discharge to home/self care              Follow-up Information     Follow up With Specialties Details Why [address] [facility] Schedule an appointment as soon as possible for a visit  If symptoms worsen [address]  [phone]            Discharge Medication List as of 11/4/2019  8:18 AM      START taking these medications    Details   amLODIPine (NORVASC) 10 mg tablet Take 1 tablet (10 mg total) by mouth daily, Starting Mon 11/4/2019, Normal      enalapril (VASOTEC) 10 mg tablet Take 1 tablet (10 mg total) by mouth daily, Starting Mon 11/4/2019, Normal      hydrochlorothiazide (HYDRODIURIL) 25 mg tablet Take 0 5 tablets (12 5 mg total) by mouth daily, Starting Mon 11/4/2019, Normal      methocarbamol (ROBAXIN) 750 mg tablet Take 1 tablet (750 mg total) by mouth 3 (three) times a day, Starting Mon 11/4/2019, Normal      oxyCODONE-acetaminophen (PERCOCET) 5-325 mg per tablet Take 1 tablet by mouth every 6 (six) hours as needed for moderate pain (May use up to two tablets every 6 hours ) for up to 10 daysMax Daily Amount: 4 tablets, Starting Mon 11/4/2019, Until u 11/14/2019, Print               ED Provider  Electronically Signed by           Franklin Kocher, DO  11/04/19 5893

## 2019-11-05 ENCOUNTER — TELEPHONE (OUTPATIENT)
Dept: PHYSICAL THERAPY | Facility: OTHER | Age: 59
End: 2019-11-05

## 2019-11-05 NOTE — TELEPHONE ENCOUNTER
Message left for Pt to call us back  Our ph # and hours of business provided  Waiting for return call from Pt  This was our first attempt to reach this Pt  Pt has [address]

## 2019-11-05 NOTE — TELEPHONE ENCOUNTER
Reviewed with Pt that we do not take her current health insurance, and that she needs to follow up with her PCP  Pt then asking what to do for her back pain because it still hurts  Pt again advised to follow up with her PCP and to go to the E D  if pain becomes worse      Referral closed

## 2019-11-06 ENCOUNTER — HOSPITAL ENCOUNTER (EMERGENCY)
Facility: HOSPITAL | Age: 59
Discharge: HOME | End: 2019-11-06
Attending: EMERGENCY MEDICINE | Admitting: EMERGENCY MEDICINE
Payer: COMMERCIAL

## 2019-11-06 VITALS
BODY MASS INDEX: 27.83 KG/M2 | SYSTOLIC BLOOD PRESSURE: 157 MMHG | RESPIRATION RATE: 16 BRPM | WEIGHT: 162.13 LBS | OXYGEN SATURATION: 99 % | TEMPERATURE: 96.5 F | HEART RATE: 73 BPM | DIASTOLIC BLOOD PRESSURE: 98 MMHG

## 2019-11-06 DIAGNOSIS — M54.12 CERVICAL RADICULOPATHY: Primary | ICD-10-CM

## 2019-11-06 DIAGNOSIS — M79.602 LEFT ARM PAIN: ICD-10-CM

## 2019-11-06 PROCEDURE — 99284 EMERGENCY DEPT VISIT MOD MDM: CPT | Performed by: EMERGENCY MEDICINE

## 2019-11-06 PROCEDURE — 99283 EMERGENCY DEPT VISIT LOW MDM: CPT

## 2019-11-06 RX ORDER — CYCLOBENZAPRINE HCL 10 MG
10 TABLET ORAL 2 TIMES DAILY PRN
Qty: 20 TABLET | Refills: 0 | Status: SHIPPED | OUTPATIENT
Start: 2019-11-06

## 2019-11-06 RX ORDER — CYCLOBENZAPRINE HCL 10 MG
10 TABLET ORAL ONCE
Status: COMPLETED | OUTPATIENT
Start: 2019-11-06 | End: 2019-11-06

## 2019-11-06 RX ORDER — LIDOCAINE 50 MG/G
1 PATCH TOPICAL ONCE
Status: DISCONTINUED | OUTPATIENT
Start: 2019-11-06 | End: 2019-11-06 | Stop reason: HOSPADM

## 2019-11-06 RX ADMIN — LIDOCAINE 1 PATCH: 50 PATCH CUTANEOUS at 13:26

## 2019-11-06 RX ADMIN — CYCLOBENZAPRINE HYDROCHLORIDE 10 MG: 10 TABLET, FILM COATED ORAL at 13:25

## 2019-12-16 ENCOUNTER — HOSPITAL ENCOUNTER (EMERGENCY)
Facility: HOSPITAL | Age: 59
Discharge: HOME | End: 2019-12-17
Attending: EMERGENCY MEDICINE
Payer: COMMERCIAL

## 2019-12-16 ENCOUNTER — APPOINTMENT (EMERGENCY)
Dept: RADIOLOGY | Facility: HOSPITAL | Age: 59
End: 2019-12-16
Payer: COMMERCIAL

## 2019-12-16 ENCOUNTER — APPOINTMENT (EMERGENCY)
Dept: CT IMAGING | Facility: HOSPITAL | Age: 59
End: 2019-12-16
Payer: COMMERCIAL

## 2019-12-16 DIAGNOSIS — R07.9 CHEST PAIN: Primary | ICD-10-CM

## 2019-12-16 DIAGNOSIS — R51.9 HEADACHE: ICD-10-CM

## 2019-12-16 DIAGNOSIS — I10 HYPERTENSION: ICD-10-CM

## 2019-12-16 DIAGNOSIS — M25.512 SHOULDER PAIN, LEFT: ICD-10-CM

## 2019-12-16 LAB
ANION GAP SERPL CALCULATED.3IONS-SCNC: 10 MMOL/L (ref 5–14)
BASOPHILS # BLD AUTO: 0.1 THOUSANDS/ΜL (ref 0–0.1)
BASOPHILS NFR BLD AUTO: 1 % (ref 0–1)
BUN SERPL-MCNC: 9 MG/DL (ref 5–25)
CALCIUM SERPL-MCNC: 10.3 MG/DL (ref 8.4–10.2)
CHLORIDE SERPL-SCNC: 106 MMOL/L (ref 97–108)
CO2 SERPL-SCNC: 25 MMOL/L (ref 22–30)
CREAT SERPL-MCNC: 0.8 MG/DL (ref 0.6–1.2)
EOSINOPHIL # BLD AUTO: 0 THOUSAND/ΜL (ref 0–0.4)
EOSINOPHIL NFR BLD AUTO: 0 % (ref 0–6)
ERYTHROCYTE [DISTWIDTH] IN BLOOD BY AUTOMATED COUNT: 16.7 %
GFR SERPL CREATININE-BSD FRML MDRD: 93 ML/MIN/1.73SQ M
GLUCOSE SERPL-MCNC: 100 MG/DL (ref 70–99)
HCT VFR BLD AUTO: 37.6 % (ref 36–46)
HGB BLD-MCNC: 12.9 G/DL (ref 12–16)
LYMPHOCYTES # BLD AUTO: 2.4 THOUSANDS/ΜL (ref 0.5–4)
LYMPHOCYTES NFR BLD AUTO: 27 % (ref 25–45)
MCH RBC QN AUTO: 32.2 PG (ref 26–34)
MCHC RBC AUTO-ENTMCNC: 34.2 G/DL (ref 31–36)
MCV RBC AUTO: 94 FL (ref 80–100)
MONOCYTES # BLD AUTO: 0.5 THOUSAND/ΜL (ref 0.2–0.9)
MONOCYTES NFR BLD AUTO: 6 % (ref 1–10)
NEUTROPHILS # BLD AUTO: 5.9 THOUSANDS/ΜL (ref 1.8–7.8)
NEUTS SEG NFR BLD AUTO: 66 % (ref 45–65)
PLATELET # BLD AUTO: 292 THOUSANDS/UL (ref 150–450)
PMV BLD AUTO: 8.1 FL (ref 8.9–12.7)
POTASSIUM SERPL-SCNC: 3.9 MMOL/L (ref 3.6–5)
RBC # BLD AUTO: 4 MILLION/UL (ref 4–5.2)
SODIUM SERPL-SCNC: 141 MMOL/L (ref 137–147)
TROPONIN I SERPL-MCNC: <0.01 NG/ML (ref 0–0.03)
WBC # BLD AUTO: 8.9 THOUSAND/UL (ref 4.5–11)

## 2019-12-16 PROCEDURE — 93005 ELECTROCARDIOGRAM TRACING: CPT

## 2019-12-16 PROCEDURE — 80048 BASIC METABOLIC PNL TOTAL CA: CPT | Performed by: EMERGENCY MEDICINE

## 2019-12-16 PROCEDURE — 71046 X-RAY EXAM CHEST 2 VIEWS: CPT

## 2019-12-16 PROCEDURE — 70450 CT HEAD/BRAIN W/O DYE: CPT

## 2019-12-16 PROCEDURE — 99285 EMERGENCY DEPT VISIT HI MDM: CPT

## 2019-12-16 PROCEDURE — 96374 THER/PROPH/DIAG INJ IV PUSH: CPT

## 2019-12-16 PROCEDURE — 84484 ASSAY OF TROPONIN QUANT: CPT | Performed by: EMERGENCY MEDICINE

## 2019-12-16 PROCEDURE — 85025 COMPLETE CBC W/AUTO DIFF WBC: CPT | Performed by: EMERGENCY MEDICINE

## 2019-12-16 PROCEDURE — 99285 EMERGENCY DEPT VISIT HI MDM: CPT | Performed by: EMERGENCY MEDICINE

## 2019-12-16 PROCEDURE — 36415 COLL VENOUS BLD VENIPUNCTURE: CPT | Performed by: EMERGENCY MEDICINE

## 2019-12-16 RX ORDER — AMLODIPINE BESYLATE 5 MG/1
5 TABLET ORAL ONCE
Status: COMPLETED | OUTPATIENT
Start: 2019-12-16 | End: 2019-12-16

## 2019-12-16 RX ORDER — HYDROCHLOROTHIAZIDE 25 MG/1
25 TABLET ORAL DAILY
Status: DISCONTINUED | OUTPATIENT
Start: 2019-12-17 | End: 2019-12-17 | Stop reason: HOSPADM

## 2019-12-16 RX ORDER — MORPHINE SULFATE 4 MG/ML
4 INJECTION, SOLUTION INTRAMUSCULAR; INTRAVENOUS ONCE
Status: COMPLETED | OUTPATIENT
Start: 2019-12-16 | End: 2019-12-16

## 2019-12-16 RX ORDER — LISINOPRIL 10 MG/1
10 TABLET ORAL DAILY
Status: DISCONTINUED | OUTPATIENT
Start: 2019-12-17 | End: 2019-12-17 | Stop reason: HOSPADM

## 2019-12-16 RX ADMIN — AMLODIPINE BESYLATE 5 MG: 5 TABLET ORAL at 22:53

## 2019-12-16 RX ADMIN — MORPHINE SULFATE 4 MG: 4 INJECTION INTRAVENOUS at 22:53

## 2019-12-17 VITALS
OXYGEN SATURATION: 97 % | RESPIRATION RATE: 16 BRPM | SYSTOLIC BLOOD PRESSURE: 166 MMHG | WEIGHT: 173.6 LBS | HEART RATE: 62 BPM | BODY MASS INDEX: 29.8 KG/M2 | TEMPERATURE: 97.6 F | DIASTOLIC BLOOD PRESSURE: 99 MMHG

## 2019-12-17 LAB
ATRIAL RATE: 78 BPM
P AXIS: 53 DEGREES
PR INTERVAL: 154 MS
QRS AXIS: 38 DEGREES
QRSD INTERVAL: 78 MS
QT INTERVAL: 398 MS
QTC INTERVAL: 453 MS
T WAVE AXIS: 89 DEGREES
TROPONIN I SERPL-MCNC: <0.01 NG/ML (ref 0–0.03)
VENTRICULAR RATE: 78 BPM

## 2019-12-17 PROCEDURE — 84484 ASSAY OF TROPONIN QUANT: CPT | Performed by: EMERGENCY MEDICINE

## 2019-12-17 PROCEDURE — 93010 ELECTROCARDIOGRAM REPORT: CPT | Performed by: INTERNAL MEDICINE

## 2019-12-17 PROCEDURE — 36415 COLL VENOUS BLD VENIPUNCTURE: CPT | Performed by: EMERGENCY MEDICINE

## 2019-12-17 RX ORDER — ENALAPRIL MALEATE 10 MG/1
10 TABLET ORAL DAILY
Qty: 21 TABLET | Refills: 0 | Status: SHIPPED | OUTPATIENT
Start: 2019-12-17 | End: 2019-12-17 | Stop reason: SDUPTHER

## 2019-12-17 RX ORDER — HYDROCHLOROTHIAZIDE 25 MG/1
25 TABLET ORAL DAILY
Qty: 21 TABLET | Refills: 0 | Status: SHIPPED | OUTPATIENT
Start: 2019-12-17 | End: 2020-01-07

## 2019-12-17 RX ORDER — AMLODIPINE BESYLATE 10 MG/1
10 TABLET ORAL DAILY
Qty: 21 TABLET | Refills: 0 | Status: SHIPPED | OUTPATIENT
Start: 2019-12-17 | End: 2020-01-07

## 2019-12-17 RX ORDER — ENALAPRIL MALEATE 10 MG/1
10 TABLET ORAL DAILY
Qty: 21 TABLET | Refills: 0 | Status: SHIPPED | OUTPATIENT
Start: 2019-12-17 | End: 2020-01-07

## 2019-12-17 RX ORDER — HYDROCHLOROTHIAZIDE 25 MG/1
25 TABLET ORAL DAILY
Qty: 21 TABLET | Refills: 0 | Status: SHIPPED | OUTPATIENT
Start: 2019-12-17 | End: 2019-12-17 | Stop reason: SDUPTHER

## 2019-12-17 RX ORDER — METOPROLOL TARTRATE 50 MG/1
25 TABLET, FILM COATED ORAL ONCE
Status: COMPLETED | OUTPATIENT
Start: 2019-12-17 | End: 2019-12-17

## 2019-12-17 RX ORDER — AMLODIPINE BESYLATE 10 MG/1
10 TABLET ORAL DAILY
Qty: 21 TABLET | Refills: 0 | Status: SHIPPED | OUTPATIENT
Start: 2019-12-17 | End: 2019-12-17 | Stop reason: SDUPTHER

## 2019-12-17 RX ADMIN — METOPROLOL TARTRATE 25 MG: 50 TABLET, FILM COATED ORAL at 00:17

## 2019-12-17 NOTE — ED PROVIDER NOTES
History  Chief Complaint   Patient presents with    Chest Pain     family stated that pt has not taken BP meds for about a week or 2  pt states that for 2 days with a pounding HA and pressure in her chest  pt states that it feels like her heart is pounding  family stated that pt was seen 2months ago with the same thing     Patient is a 59-year-old female history of hypertension, pre diabetes, history of cervical neuropathy and headaches  Presents for 1 day of head pressure and which describes as a squeezing pressure around her chest and Heart  Denies difficulty breathing  Denies any focal weakness numbness or tingling or vision changes  States she is not taking anything for her pain control other than having that her gabapentin  States she had similar problems about 2 months ago thinks it was related to her elevated blood pressure  States she has been off of her blood pressure medications for approximately 1 to may be 2 weeks  Although she did find 1 pill yesterday which she took and then started having her headache  Denies being on any anticoagulants, states she has an allergy to aspirin for which she cannot provide the specific reaction to  Prior to Admission Medications   Prescriptions Last Dose Informant Patient Reported? Taking?    amLODIPine (NORVASC) 10 mg tablet   No No   Sig: Take 1 tablet (10 mg total) by mouth daily   amLODIPine (NORVASC) 10 mg tablet   No No   Sig: Take 1 tablet (10 mg total) by mouth daily for 21 days   cyclobenzaprine (FLEXERIL) 10 mg tablet   No No   Sig: Take 1 tablet (10 mg total) by mouth 2 (two) times a day as needed for muscle spasms   enalapril (VASOTEC) 10 mg tablet   No No   Sig: Take 1 tablet (10 mg total) by mouth daily   enalapril (VASOTEC) 10 mg tablet   No No   Sig: Take 1 tablet (10 mg total) by mouth daily for 21 days   hydrochlorothiazide (HYDRODIURIL) 25 mg tablet   No No   Sig: Take 0 5 tablets (12 5 mg total) by mouth daily   hydrochlorothiazide (HYDRODIURIL) 25 mg tablet   No No   Sig: Take 1 tablet (25 mg total) by mouth daily for 21 days      Facility-Administered Medications: None       Past Medical History:   Diagnosis Date    Anxiety     Chronic pain     back pain     Depression     Diabetes mellitus ([facility] [address]     Disease of thyroid gland     hypothyroid     Hyperlipidemia     Hypertension     Psychiatric disorder        Past Surgical History:   Procedure Laterality Date    DENTAL SURGERY      HYSTERECTOMY         History reviewed  No pertinent family history  I have reviewed and agree with the history as documented  Social History     Tobacco Use    Smoking status: Current Every Day Smoker     Packs/day: 2 00     Types: Cigarettes    Smokeless tobacco: Never Used   Substance Use Topics    Alcohol use: Yes     Alcohol/week: 3 0 standard drinks     Types: 3 Cans of beer per week    Drug use: No        Review of Systems   Constitutional: Negative  Negative for chills and fever  HENT: Negative  Negative for rhinorrhea, sore throat, trouble swallowing and voice change  Eyes: Negative  Negative for pain and visual disturbance  Respiratory: Positive for chest tightness  Negative for cough, shortness of breath and wheezing  Cardiovascular: Positive for chest pain  Negative for palpitations and leg swelling  Gastrointestinal: Negative for abdominal pain, diarrhea, nausea and vomiting  Genitourinary: Negative  Negative for dysuria and frequency  Musculoskeletal: Negative  Negative for neck pain and neck stiffness  Skin: Negative  Negative for rash  Neurological: Positive for headaches  Negative for dizziness, speech difficulty, weakness, light-headedness and numbness  Physical Exam  Physical Exam   Constitutional: She is oriented to person, place, and time  She appears well-developed and well-nourished  No distress  HENT:   Head: Normocephalic and atraumatic     Mouth/Throat: Oropharynx is clear and moist    Eyes: Pupils are equal, round, and reactive to light  Conjunctivae and EOM are normal    Neck: Normal range of motion  Neck supple  No tracheal deviation present  Cardiovascular: Normal rate, regular rhythm and intact distal pulses  Pulmonary/Chest: Effort normal and breath sounds normal  No respiratory distress  She has no wheezes  She has no rales  Abdominal: Soft  Bowel sounds are normal  She exhibits no distension  There is no tenderness  There is no rebound and no guarding  Musculoskeletal: Normal range of motion  She exhibits no tenderness or deformity  Neurological: She is alert and oriented to person, place, and time  She has normal strength  No cranial nerve deficit or sensory deficit  Coordination and gait normal  GCS eye subscore is 4  GCS verbal subscore is 5  GCS motor subscore is 6  Skin: Skin is warm and dry  Capillary refill takes less than 2 seconds  No rash noted  Psychiatric: She has a normal mood and affect  Her behavior is normal    Nursing note and vitals reviewed        Vital Signs  ED Triage Vitals   Temperature Pulse Respirations Blood Pressure SpO2   12/16/19 2224 12/16/19 2224 12/16/19 2224 12/16/19 2224 12/16/19 2224   97 6 °F (36 4 °C) 87 18 (!) 189/124 97 %      Temp Source Heart Rate Source Patient Position - Orthostatic VS BP Location FiO2 (%)   12/16/19 2224 12/16/19 2224 12/16/19 2224 12/16/19 2224 --   Tympanic Monitor Sitting Left arm       Pain Score       12/16/19 2234       9           Vitals:    12/17/19 0130 12/17/19 0145 12/17/19 0200 12/17/19 0215   BP: (!) 182/106 (!) 175/105 (!) 163/103 166/99   Pulse: 66 66 71 62   Patient Position - Orthostatic VS: Lying Lying Lying Lying         Visual Acuity  Visual Acuity      Most Recent Value   L Pupil Size (mm)  3   R Pupil Size (mm)  3          ED Medications  Medications   hydrochlorothiazide (HYDRODIURIL) tablet 25 mg (has no administration in time range)   lisinopril (ZESTRIL) tablet 10 mg (has no administration in time range)   amLODIPine (NORVASC) tablet 5 mg (5 mg Oral Given 12/16/19 2253)   morphine (PF) 4 mg/mL injection 4 mg (4 mg Intravenous Given 12/16/19 2253)   metoprolol tartrate (LOPRESSOR) tablet 25 mg (25 mg Oral Given 12/17/19 0017)       Diagnostic Studies  Results Reviewed     Procedure Component Value Units Date/Time    Troponin I [[id number]]  (Normal) Collected:  12/17/19 0131    Lab Status:  Final result Specimen:  Blood from Arm, Right Updated:  12/17/19 0204     Troponin I <0 01 ng/mL     Troponin I [[id number]]  (Normal) Collected:  12/16/19 2237    Lab Status:  Final result Specimen:  Blood from Arm, Right Updated:  12/16/19 2307     Troponin I <0 01 ng/mL     Basic metabolic panel [[id number]]  (Abnormal) Collected:  12/16/19 2237    Lab Status:  Final result Specimen:  Blood from Arm, Right Updated:  12/16/19 2257     Sodium 141 mmol/L      Potassium 3 9 mmol/L      Chloride 106 mmol/L      CO2 25 mmol/L      ANION GAP 10 mmol/L      BUN 9 mg/dL      Creatinine 0 80 mg/dL      Glucose 100 mg/dL      Calcium 10 3 mg/dL      eGFR 93 ml/min/1 73sq m     Narrative:       [place] guidelines for Chronic Kidney Disease (CKD):     Stage 1 with normal or high GFR (GFR > 90 mL/min/1 73 square meters)    Stage 2 Mild CKD (GFR = 60-89 mL/min/1 73 square meters)    Stage 3A Moderate CKD (GFR = 45-59 mL/min/1 73 square meters)    Stage 3B Moderate CKD (GFR = 30-44 mL/min/1 73 square meters)    Stage 4 Severe CKD (GFR = 15-29 mL/min/1 73 square meters)    Stage 5 End Stage CKD (GFR <15 mL/min/1 73 square meters)  Note: GFR calculation is accurate only with a steady state creatinine    CBC and differential [[id number]]  (Abnormal) Collected:  12/16/19 2237    Lab Status:  Final result Specimen:  Blood from Arm, Right Updated:  12/16/19 2249     WBC 8 90 Thousand/uL      RBC 4 00 Million/uL      Hemoglobin 12 9 g/dL      Hematocrit 37 6 %      MCV 94 fL      MCH 32 2 pg      MCHC 34 2 g/dL RDW 16 7 %      MPV 8 1 fL      Platelets 696 Thousands/uL      Neutrophils Relative 66 %      Lymphocytes Relative 27 %      Monocytes Relative 6 %      Eosinophils Relative 0 %      Basophils Relative 1 %      Neutrophils Absolute 5 90 Thousands/µL      Lymphocytes Absolute 2 40 Thousands/µL      Monocytes Absolute 0 50 Thousand/µL      Eosinophils Absolute 0 00 Thousand/µL      Basophils Absolute 0 10 Thousands/µL                  CT head without contrast   Final Result by Lazaro Rich DO (12/16 2321)      No acute intracranial abnormality is seen  Other findings as above  Workstation performed: [id number]         X-ray chest 2 views   ED Interpretation by Lizzie Diego DO (12/16 2321)   No acute pna or ptx appreciated                 Procedures  Procedures         ED Course  ED Course as of Dec 17 0223   Mon Dec 16, 2019   2228 Procedure Note: EKG  Date/Time: 12/16/19 10:28 PM   Performed by: Nehemiah Pedraza  Authorized by: Nehemiah Pedraza  ECG interpreted by me, the ED Provider: yes   The EKG demonstrates:  Rate 78  Rhythm sinus  QTc 453  No ST elevations/depressions          Tue Dec 17, 2019   0209 2 Negative troponins  Chest pain free  HA improved  Patient offered admission, would prefer to follow up outpatient  Will refill prescriptions for HTN  Aware of follow up care needs and strict return precuations     Troponin I         HEART Risk Score      Most Recent Value   History  1 Filed at: 12/17/2019 0009   ECG  1 Filed at: 12/17/2019 0009   Age  1 Filed at: 12/17/2019 0009   Risk Factors  1 Filed at: 12/17/2019 0009   Troponin  0 Filed at: 12/17/2019 0009   Heart Score Risk Calculator   History  1 Filed at: 12/17/2019 0009   ECG  1 Filed at: 12/17/2019 0009   Age  1 Filed at: 12/17/2019 0009   Risk Factors  1 Filed at: 12/17/2019 0009   Troponin  0 Filed at: 12/17/2019 0009   HEART Score  4 Filed at: 12/17/2019 0009   HEART Score  4 Filed at: 12/17/2019 0009 MDM  Number of Diagnoses or Management Options  Chest pain:   Headache:   Hypertension:   Diagnosis management comments: Patient is a 27-year-old female presenting for headache and what appears to be chest pain  Has significantly elevated blood pressure, will provide her home medications  Reassess before starting her on IV medications  CT scan pending, will also rule out ACS with chest x-ray, EKG and serial troponins  Will provide analgesia as well  Disposition pending the results of her imaging and lab work  Patient with 2 undetectable troponins, the headache improved, CT brain was negative, EKG was unremarkable  Chest x-ray okay  Review with patient the need for follow-up care strict return precautions  Patient was offered admission and she preferred to refill her prescriptions and follow-up as an outpatient  States she will see her primary care doctor on January 6, 2020  Advised her to call them sooner to see if they will be able to get her in for close follow-up  If unable to do so she can return to the emergency room for further evaluation           Amount and/or Complexity of Data Reviewed  Clinical lab tests: ordered and reviewed  Tests in the radiology section of CPT®: reviewed and ordered  Tests in the medicine section of CPT®: ordered and reviewed  Independent visualization of images, tracings, or specimens: yes          Disposition  Final diagnoses:   Chest pain   Headache   Hypertension     Time reflects when diagnosis was documented in both MDM as applicable and the Disposition within this note     Time User Action Codes Description Comment    12/17/2019  1:40 AM Geraldean Kinds Add [M25 512] Shoulder pain, left     12/17/2019  2:10 AM Geraldean Kinds Add [R07 9] Chest pain     12/17/2019  2:10 AM Geraldean Kinds Add [R51] Headache     12/17/2019  2:10 AM Geraldean Kinds Add [I10] Hypertension       ED Disposition     ED Disposition Condition Date/Time Comment Discharge Stable Tue Dec 17, 2019  2:10 AM Carol Varghese discharge to home/self care  Follow-up Information     Follow up With Specialties Details Why Contact Info Additional [address] [facility] [address], DO Family Medicine   [address] [facility] [address]  [facility]  [phone]       [facility] Neurology [facility] Neurology   [address]  [place] [address] [address]  [address] [facility] Neurology [facility], [address], [place] [address] [place], [facility], [place], [address]    [address] [facility] Cardiology Schedule an appointment as soon as possible for a visit   [facility] [address] [facility] [address], [address] [facility], [place], [place], [address] [phone]          Current Discharge Medication List      CONTINUE these medications which have CHANGED    Details   amLODIPine (NORVASC) 10 mg tablet Take 1 tablet (10 mg total) by mouth daily for 21 days  Qty: 21 tablet, Refills: 0    Associated Diagnoses: Shoulder pain, left      enalapril (VASOTEC) 10 mg tablet Take 1 tablet (10 mg total) by mouth daily for 21 days  Qty: 21 tablet, Refills: 0    Associated Diagnoses: Shoulder pain, left      hydrochlorothiazide (HYDRODIURIL) 25 mg tablet Take 1 tablet (25 mg total) by mouth daily for 21 days  Qty: 21 tablet, Refills: 0    Associated Diagnoses: Shoulder pain, left         CONTINUE these medications which have NOT CHANGED    Details   cyclobenzaprine (FLEXERIL) 10 mg tablet Take 1 tablet (10 mg total) by mouth 2 (two) times a day as needed for muscle spasms  Qty: 20 tablet, Refills: 0    Associated Diagnoses: Cervical radiculopathy           No discharge procedures on file      ED Provider  Electronically Signed by           Mae Lyons DO  12/17/19 3016

## 2020-01-24 ENCOUNTER — TELEPHONE (OUTPATIENT)
Dept: OTHER | Facility: OTHER | Age: 60
End: 2020-01-24

## 2020-01-24 NOTE — TELEPHONE ENCOUNTER
Pt is calling because her medications are still not at the pharmacy, and she is experiencing a headache

## 2020-02-07 ENCOUNTER — HOSPITAL ENCOUNTER (EMERGENCY)
Facility: HOSPITAL | Age: 60
Discharge: HOME | End: 2020-02-07
Attending: EMERGENCY MEDICINE | Admitting: EMERGENCY MEDICINE
Payer: COMMERCIAL

## 2020-02-07 VITALS
DIASTOLIC BLOOD PRESSURE: 85 MMHG | OXYGEN SATURATION: 98 % | BODY MASS INDEX: 29.22 KG/M2 | TEMPERATURE: 97.4 F | SYSTOLIC BLOOD PRESSURE: 135 MMHG | HEART RATE: 89 BPM | WEIGHT: 170.25 LBS | RESPIRATION RATE: 18 BRPM

## 2020-02-07 DIAGNOSIS — H92.09 OTALGIA: Primary | ICD-10-CM

## 2020-02-07 DIAGNOSIS — R73.9 HYPERGLYCEMIA: ICD-10-CM

## 2020-02-07 LAB
ALBUMIN SERPL BCP-MCNC: 4.3 G/DL (ref 3–5.2)
ALP SERPL-CCNC: 63 U/L (ref 43–122)
ALT SERPL W P-5'-P-CCNC: 20 U/L (ref 9–52)
ANION GAP SERPL CALCULATED.3IONS-SCNC: 11 MMOL/L (ref 5–14)
AST SERPL W P-5'-P-CCNC: 18 U/L (ref 14–36)
BASOPHILS # BLD AUTO: 0 THOUSANDS/ΜL (ref 0–0.1)
BASOPHILS NFR BLD AUTO: 0 % (ref 0–1)
BILIRUB SERPL-MCNC: 0.2 MG/DL
BUN SERPL-MCNC: 7 MG/DL (ref 5–25)
CALCIUM SERPL-MCNC: 9.5 MG/DL (ref 8.4–10.2)
CHLORIDE SERPL-SCNC: 107 MMOL/L (ref 97–108)
CO2 SERPL-SCNC: 21 MMOL/L (ref 22–30)
CREAT SERPL-MCNC: 0.77 MG/DL (ref 0.6–1.2)
EOSINOPHIL # BLD AUTO: 0.1 THOUSAND/ΜL (ref 0–0.4)
EOSINOPHIL NFR BLD AUTO: 1 % (ref 0–6)
ERYTHROCYTE [DISTWIDTH] IN BLOOD BY AUTOMATED COUNT: 15.1 %
GFR SERPL CREATININE-BSD FRML MDRD: 98 ML/MIN/1.73SQ M
GLUCOSE SERPL-MCNC: 206 MG/DL (ref 70–99)
HCT VFR BLD AUTO: 35 % (ref 36–46)
HGB BLD-MCNC: 12 G/DL (ref 12–16)
LYMPHOCYTES # BLD AUTO: 2.9 THOUSANDS/ΜL (ref 0.5–4)
LYMPHOCYTES NFR BLD AUTO: 34 % (ref 25–45)
MCH RBC QN AUTO: 32.7 PG (ref 26–34)
MCHC RBC AUTO-ENTMCNC: 34.2 G/DL (ref 31–36)
MCV RBC AUTO: 96 FL (ref 80–100)
MONOCYTES # BLD AUTO: 0.4 THOUSAND/ΜL (ref 0.2–0.9)
MONOCYTES NFR BLD AUTO: 5 % (ref 1–10)
NEUTROPHILS # BLD AUTO: 5.2 THOUSANDS/ΜL (ref 1.8–7.8)
NEUTS SEG NFR BLD AUTO: 60 % (ref 45–65)
PLATELET # BLD AUTO: 232 THOUSANDS/UL (ref 150–450)
PMV BLD AUTO: 8.6 FL (ref 8.9–12.7)
POTASSIUM SERPL-SCNC: 3.5 MMOL/L (ref 3.6–5)
PROT SERPL-MCNC: 7.2 G/DL (ref 5.9–8.4)
RBC # BLD AUTO: 3.66 MILLION/UL (ref 4–5.2)
SALICYLATES SERPL-MCNC: <1 MG/DL (ref 10–30)
SODIUM SERPL-SCNC: 139 MMOL/L (ref 137–147)
WBC # BLD AUTO: 8.7 THOUSAND/UL (ref 4.5–11)

## 2020-02-07 PROCEDURE — 99282 EMERGENCY DEPT VISIT SF MDM: CPT | Performed by: EMERGENCY MEDICINE

## 2020-02-07 PROCEDURE — 99285 EMERGENCY DEPT VISIT HI MDM: CPT

## 2020-02-07 PROCEDURE — 36415 COLL VENOUS BLD VENIPUNCTURE: CPT | Performed by: EMERGENCY MEDICINE

## 2020-02-07 PROCEDURE — 93005 ELECTROCARDIOGRAM TRACING: CPT

## 2020-02-07 PROCEDURE — 80329 ANALGESICS NON-OPIOID 1 OR 2: CPT | Performed by: EMERGENCY MEDICINE

## 2020-02-07 PROCEDURE — 85025 COMPLETE CBC W/AUTO DIFF WBC: CPT | Performed by: EMERGENCY MEDICINE

## 2020-02-07 PROCEDURE — 80053 COMPREHEN METABOLIC PANEL: CPT | Performed by: EMERGENCY MEDICINE

## 2020-02-08 NOTE — ED PROVIDER NOTES
History  Chief Complaint   Patient presents with   Jeff Barrios     "I have left ear pain, heart racing and anxiety for the past 2 weeks, did not take any medication for any of this "    Anxiety    Rapid Heart Rate     Patient is a 49-year-old female who presents for concerns of left ear pain  She states that she has had symptoms for approximately 2-3 weeks  She went to see her family doctor for this and they adjusted some of her medications  She states that she has been taking them as prescribed and the symptoms have not significantly changed  She has denies calling back to her family practice provider for additional medication adjustments  She denies any trauma fevers, loss of hearing, neck pain, chest pain shortness of breath nausea vomiting diarrhea dysuria frequency  She states that the pain it gives her increased anxiety and makes her heart feels like it is racing at times  Prior to Admission Medications   Prescriptions Last Dose Informant Patient Reported? Taking? amLODIPine (NORVASC) 10 mg tablet   No No   Sig: Take 1 tablet (10 mg total) by mouth daily for 21 days   cyclobenzaprine (FLEXERIL) 10 mg tablet   No No   Sig: Take 1 tablet (10 mg total) by mouth 2 (two) times a day as needed for muscle spasms   enalapril (VASOTEC) 10 mg tablet   No No   Sig: Take 1 tablet (10 mg total) by mouth daily for 21 days   hydrochlorothiazide (HYDRODIURIL) 25 mg tablet   No No   Sig: Take 1 tablet (25 mg total) by mouth daily for 21 days      Facility-Administered Medications: None       Past Medical History:   Diagnosis Date    Anxiety     Chronic pain     back pain     Depression     Diabetes mellitus ([address] )     Disease of thyroid gland     hypothyroid     Hyperlipidemia     Hypertension     Psychiatric disorder        Past Surgical History:   Procedure Laterality Date    DENTAL SURGERY      HYSTERECTOMY         History reviewed  No pertinent family history    I have reviewed and agree with the history as documented  Social History     Tobacco Use    Smoking status: Current Every Day Smoker     Packs/day: 2 00     Types: Cigarettes    Smokeless tobacco: Never Used   Substance Use Topics    Alcohol use: Yes     Alcohol/week: 3 0 standard drinks     Types: 3 Cans of beer per week    Drug use: No        Review of Systems   Constitutional: Negative  Negative for chills and fever  HENT: Positive for ear pain  Negative for rhinorrhea, sore throat, trouble swallowing and voice change  Eyes: Negative  Negative for pain and visual disturbance  Respiratory: Negative  Negative for cough, shortness of breath and wheezing  Cardiovascular: Positive for palpitations  Negative for chest pain  Gastrointestinal: Negative for abdominal pain, diarrhea, nausea and vomiting  Genitourinary: Negative  Negative for dysuria and frequency  Musculoskeletal: Negative  Negative for neck pain and neck stiffness  Skin: Negative  Negative for rash  Neurological: Negative  Negative for dizziness, speech difficulty, weakness, light-headedness and numbness  Physical Exam  Physical Exam   Constitutional: She is oriented to person, place, and time  She appears well-developed and well-nourished  No distress  HENT:   Head: Normocephalic and atraumatic  Mouth/Throat: Oropharynx is clear and moist    Eyes: Pupils are equal, round, and reactive to light  Conjunctivae and EOM are normal    Neck: Normal range of motion  Neck supple  No tracheal deviation present  Cardiovascular: Normal rate, regular rhythm and intact distal pulses  Pulmonary/Chest: Effort normal and breath sounds normal  No respiratory distress  She has no wheezes  She has no rales  Abdominal: Soft  Bowel sounds are normal  She exhibits no distension  There is no tenderness  There is no rebound and no guarding  Musculoskeletal: Normal range of motion  She exhibits no tenderness or deformity     Neurological: She is alert and oriented to person, place, and time  Skin: Skin is warm and dry  Capillary refill takes less than 2 seconds  No rash noted  Psychiatric: She has a normal mood and affect  Her behavior is normal    Nursing note and vitals reviewed  Vital Signs  ED Triage Vitals   Temperature Pulse Respirations Blood Pressure SpO2   02/07/20 2142 02/07/20 2146 02/07/20 2146 02/07/20 2146 02/07/20 2146   (!) 97 4 °F (36 3 °C) 90 16 137/82 98 %      Temp Source Heart Rate Source Patient Position - Orthostatic VS BP Location FiO2 (%)   02/07/20 2142 02/07/20 2146 02/07/20 2146 02/07/20 2146 --   Tympanic Monitor Sitting Left arm       Pain Score       02/07/20 2146       No Pain           Vitals:    02/07/20 2146   BP: 137/82   Pulse: 90   Patient Position - Orthostatic VS: Sitting         Visual Acuity      ED Medications  Medications - No data to display    Diagnostic Studies  Results Reviewed     Procedure Component Value Units Date/Time    CBC and differential [[id number]] Collected:  02/07/20 2216    Lab Status: In process Specimen:  Blood from Arm, Left Updated:  02/07/20 2221    Comprehensive metabolic panel [[id number]] Collected:  02/07/20 2216    Lab Status: In process Specimen:  Blood from Arm, Left Updated:  84/17/50 5777    Salicylate level [[id number]] Collected:  02/07/20 2216    Lab Status:   In process Specimen:  Blood from Arm, Left Updated:  02/07/20 2221                 No orders to display              Procedures  Procedures         ED Course  ED Course as of Feb 07 2221 Fri Feb 07, 2020 2209 Procedure Note: EKG  Date/Time: 02/07/20 10:09 PM   Performed by: Caitlin Torres  Authorized by: Caitlin Torres  ECG interpreted by me, the ED Provider: yes   The EKG demonstrates:  Rate 79  Rhythm sinus  QTc 403  No ST elevations/depressions                                      MDM  Number of Diagnoses or Management Options  Hyperglycemia:   Otalgia:   Diagnosis management comments: Patient is a 49-year-old female who presents for concerns of left ear pain, which she described as ringing sensation  She is well-appearing in no acute distress  She has a benign neurologic examination  No focal deficits can be elicited  Patient's does take aspirin, will check a salicylate level to ensure that she is not excessively using this for pain management  Patient states she is otherwise taking medications as prescribed  EKG is interpreted by me was grossly unremarkable  If her clinical lab testing is within normal limits she can be discharged back to outpatient follow-up  She states she is having some difficulties with her primary care provider  Will give her new contacted information, also reviewing that her insurance company provides a source for her to be able to find a new family doctor she should choose to do so  Salicylate was undetectable  Amount and/or Complexity of Data Reviewed  Clinical lab tests: ordered and reviewed  Independent visualization of images, tracings, or specimens: yes          Disposition  Final diagnoses:   None     ED Disposition     None      Follow-up Information    None         Patient's Medications   Discharge Prescriptions    No medications on file     No discharge procedures on file      ED Provider  Electronically Signed by           Tonia Ledezma DO  02/08/20 1655

## 2020-02-10 LAB
ATRIAL RATE: 79 BPM
P AXIS: 52 DEGREES
PR INTERVAL: 166 MS
QRS AXIS: 52 DEGREES
QRSD INTERVAL: 78 MS
QT INTERVAL: 352 MS
QTC INTERVAL: 403 MS
T WAVE AXIS: 72 DEGREES
VENTRICULAR RATE: 79 BPM

## 2020-02-10 PROCEDURE — 93010 ELECTROCARDIOGRAM REPORT: CPT | Performed by: INTERNAL MEDICINE

## 2022-02-05 NOTE — ED PROVIDER NOTES
History  Chief Complaint   Patient presents with    Arm Pain     Patient reports being seen here monday and given oxycodone and mucle relaxers "The medicines are just not working, i take them everyday and i still have the pain"  62 yo F with PMH of chronic neck pain with arthritis, HTN, HLD presents to ED with persistent L shoulder pain  Was seen here 11/4 for same  Was given robaxin, percocet, and comprehensive spine referral  No trauma/injury/fall  States worse with movement  Pain starts in L shoulder and radiates down arm  No swelling  Feels like her "veins are pulsating " Painful to move  No CP/SOB  Pain is persistent for at least a week  Taking percocet/robaxin at home, which helps for an hour, but then pain comes back  History provided by:  Patient, medical records and spouse   used: No    Arm Pain   Location:  Left  Quality:  Ache  Severity:  Moderate  Onset quality:  Gradual  Timing:  Constant  Progression:  Unchanged  Chronicity:  New  Associated symptoms: myalgias    Associated symptoms: no abdominal pain, no chest pain, no congestion, no cough, no diarrhea, no ear pain, no fatigue, no fever, no headaches, no loss of consciousness, no nausea, no rash, no rhinorrhea, no shortness of breath, no sore throat, no vomiting and no wheezing        Prior to Admission Medications   Prescriptions Last Dose Informant Patient Reported? Taking?    amLODIPine (NORVASC) 10 mg tablet   No No   Sig: Take 1 tablet (10 mg total) by mouth daily   enalapril (VASOTEC) 10 mg tablet   No No   Sig: Take 1 tablet (10 mg total) by mouth daily   hydrochlorothiazide (HYDRODIURIL) 25 mg tablet   No No   Sig: Take 0 5 tablets (12 5 mg total) by mouth daily   methocarbamol (ROBAXIN) 750 mg tablet   No No   Sig: Take 1 tablet (750 mg total) by mouth 3 (three) times a day   oxyCODONE-acetaminophen (PERCOCET) 5-325 mg per tablet   No No   Sig: Take 1 tablet by mouth every 6 (six) hours as needed for moderate pain (May use up to two tablets every 6 hours ) for up to 10 daysMax Daily Amount: 4 tablets      Facility-Administered Medications: None       Past Medical History:   Diagnosis Date    Anxiety     Chronic pain     back pain     Depression     Diabetes mellitus ([facility] [address]     Disease of thyroid gland     hypothyroid     Hyperlipidemia     Hypertension     Psychiatric disorder        Past Surgical History:   Procedure Laterality Date    DENTAL SURGERY      HYSTERECTOMY         History reviewed  No pertinent family history  I have reviewed and agree with the history as documented  Social History     Tobacco Use    Smoking status: Current Every Day Smoker     Packs/day: 2 00     Types: Cigarettes    Smokeless tobacco: Never Used   Substance Use Topics    Alcohol use: Yes     Alcohol/week: 3 0 standard drinks     Types: 3 Cans of beer per week    Drug use: No        Review of Systems   Constitutional: Negative for appetite change, chills, fatigue and fever  HENT: Negative for congestion, ear pain, rhinorrhea, sore throat, trouble swallowing and voice change  Eyes: Negative for pain and visual disturbance  Respiratory: Negative for cough, chest tightness, shortness of breath and wheezing  Cardiovascular: Negative for chest pain, palpitations and leg swelling  Gastrointestinal: Negative for abdominal pain, blood in stool, constipation, diarrhea, nausea and vomiting  Genitourinary: Negative for difficulty urinating and hematuria  Musculoskeletal: Positive for myalgias and neck pain (chronic)  Negative for back pain and neck stiffness  Skin: Negative for rash  Neurological: Negative for dizziness, loss of consciousness, syncope, speech difficulty, light-headedness and headaches  Psychiatric/Behavioral: Negative for confusion and suicidal ideas  Physical Exam  Physical Exam   Constitutional: She is oriented to person, place, and time  She appears well-developed and well-nourished   No distress  HENT:   Head: Normocephalic and atraumatic  Right Ear: External ear normal    Left Ear: External ear normal    Nose: Nose normal    Mouth/Throat: Oropharynx is clear and moist    Eyes: Pupils are equal, round, and reactive to light  Conjunctivae and EOM are normal  Right eye exhibits no discharge  Left eye exhibits no discharge  No scleral icterus  Neck: Normal range of motion  Neck supple  No tracheal deviation present  Cardiovascular: Normal rate, regular rhythm, normal heart sounds and intact distal pulses  Exam reveals no gallop and no friction rub  No murmur heard  Pulses:       Radial pulses are 2+ on the right side, and 2+ on the left side  Pulmonary/Chest: Effort normal and breath sounds normal  No stridor  No respiratory distress  She exhibits no tenderness  Abdominal: Soft  Bowel sounds are normal  There is no tenderness  There is no rebound and no guarding  Musculoskeletal: Normal range of motion  She exhibits no edema or deformity  Left shoulder: She exhibits tenderness (left bicep, without swelling, deformity, or rupture  NV intact  )  She exhibits normal range of motion, no bony tenderness, no swelling and no crepitus  Cervical back: She exhibits no bony tenderness and no deformity  Lymphadenopathy:     She has no cervical adenopathy  Neurological: She is alert and oriented to person, place, and time  No cranial nerve deficit or sensory deficit  Coordination normal    Skin: Skin is warm and dry  No rash noted  She is not diaphoretic  Psychiatric: She has a normal mood and affect  Her behavior is normal    Nursing note and vitals reviewed        Vital Signs  ED Triage Vitals [11/06/19 1306]   Temperature Pulse Respirations Blood Pressure SpO2   (!) 96 5 °F (35 8 °C) 73 16 157/98 99 %      Temp Source Heart Rate Source Patient Position - Orthostatic VS BP Location FiO2 (%)   Tympanic -- Sitting Right arm --      Pain Score       Worst Possible Pain Vitals:    11/06/19 1306   BP: 157/98   Pulse: 73   Patient Position - Orthostatic VS: Sitting         Visual Acuity      ED Medications  Medications   lidocaine (LIDODERM) 5 % patch 1 patch (1 patch Topical Medication Applied 11/6/19 1326)   cyclobenzaprine (FLEXERIL) tablet 10 mg (10 mg Oral Given 11/6/19 1325)       Diagnostic Studies  Results Reviewed     None                 No orders to display              Procedures  Procedures       ED Course                               MDM  Number of Diagnoses or Management Options  Cervical radiculopathy: new and does not require workup  Left arm pain: new and does not require workup     Amount and/or Complexity of Data Reviewed  Review and summarize past medical records: yes    Risk of Complications, Morbidity, and/or Mortality  Presenting problems: low  Diagnostic procedures: low  Management options: low  General comments: Pt with no CP/SOB  Given h/o cervical spine pain (though none on palpation here), and radiating sx, suspect radiculopathy  No swelling or erythema to suggest DVT  NV intact  FROM active and passive, less likely rotator cuff tear  No sign of trauma, no biceps tendon rupture  Recommended supportive care, flexeril, lidocaine patch, and f/u with comprehensive spine  RTED instructions given  Patient Progress  Patient progress: improved      Disposition  Final diagnoses:   Cervical radiculopathy   Left arm pain     Time reflects when diagnosis was documented in both MDM as applicable and the Disposition within this note     Time User Action Codes Description Comment    11/6/2019  1:31 PM Roman Torres Add [X20 87] Cervical radiculopathy     11/6/2019  1:31 PM Roman Torres Add [L40 231] Left arm pain       ED Disposition     ED Disposition Condition Date/Time Comment    Discharge Stable Wed Nov 6, 2019  1:30 PM Aretha Owen discharge to home/self care              Follow-up Information     Follow up With Specialties Details Why Contact Info    Jacky Small,  Family Medicine Go to  As scheduled  [address] Emergency Department Emergency Medicine  If symptoms worsen [address] [address] [phone]          Discharge Medication List as of 11/6/2019  1:32 PM      START taking these medications    Details   cyclobenzaprine (FLEXERIL) 10 mg tablet Take 1 tablet (10 mg total) by mouth 2 (two) times a day as needed for muscle spasms, Starting Wed 11/6/2019, Print         CONTINUE these medications which have NOT CHANGED    Details   amLODIPine (NORVASC) 10 mg tablet Take 1 tablet (10 mg total) by mouth daily, Starting Mon 11/4/2019, Normal      enalapril (VASOTEC) 10 mg tablet Take 1 tablet (10 mg total) by mouth daily, Starting Mon 11/4/2019, Normal      hydrochlorothiazide (HYDRODIURIL) 25 mg tablet Take 0 5 tablets (12 5 mg total) by mouth daily, Starting Mon 11/4/2019, Normal      oxyCODONE-acetaminophen (PERCOCET) 5-325 mg per tablet Take 1 tablet by mouth every 6 (six) hours as needed for moderate pain (May use up to two tablets every 6 hours ) for up to 10 daysMax Daily Amount: 4 tablets, Starting Mon 11/4/2019, Until Thu 11/14/2019, Print         STOP taking these medications       methocarbamol (ROBAXIN) 750 mg tablet Comments:   Reason for Stopping:             No discharge procedures on file      ED Provider  Electronically Signed by           Zoya Patel MD  11/06/19 1887 Area L Indication Text: Tumors in this location are included in Area L (trunk and extremities).  Mohs surgery is indicated for larger tumors, or tumors with aggressive histologic features, in these anatomic locations.

## 2022-04-28 ENCOUNTER — HOSPITAL ENCOUNTER (EMERGENCY)
Facility: HOSPITAL | Age: 62
Discharge: HOME | End: 2022-04-28
Attending: EMERGENCY MEDICINE
Payer: COMMERCIAL

## 2022-04-28 ENCOUNTER — APPOINTMENT (EMERGENCY)
Dept: CT IMAGING | Facility: HOSPITAL | Age: 62
End: 2022-04-28
Payer: COMMERCIAL

## 2022-04-28 ENCOUNTER — APPOINTMENT (EMERGENCY)
Dept: RADIOLOGY | Facility: HOSPITAL | Age: 62
End: 2022-04-28
Payer: COMMERCIAL

## 2022-04-28 VITALS
HEART RATE: 55 BPM | DIASTOLIC BLOOD PRESSURE: 100 MMHG | TEMPERATURE: 98.5 F | BODY MASS INDEX: 29.21 KG/M2 | SYSTOLIC BLOOD PRESSURE: 157 MMHG | WEIGHT: 170.19 LBS | RESPIRATION RATE: 18 BRPM | OXYGEN SATURATION: 98 %

## 2022-04-28 DIAGNOSIS — Z86.59 HISTORY OF ANXIETY: ICD-10-CM

## 2022-04-28 DIAGNOSIS — R51.9 HEADACHE: Primary | ICD-10-CM

## 2022-04-28 DIAGNOSIS — I10 HTN (HYPERTENSION): ICD-10-CM

## 2022-04-28 LAB
ALBUMIN SERPL BCP-MCNC: 4.8 G/DL (ref 3–5.2)
ALP SERPL-CCNC: 67 U/L (ref 43–122)
ALT SERPL W P-5'-P-CCNC: 12 U/L
ANION GAP SERPL CALCULATED.3IONS-SCNC: 6 MMOL/L (ref 5–14)
AST SERPL W P-5'-P-CCNC: 23 U/L (ref 14–36)
ATRIAL RATE: 56 BPM
BASOPHILS # BLD AUTO: 0.03 THOUSANDS/ΜL (ref 0–0.1)
BASOPHILS NFR BLD AUTO: 1 % (ref 0–1)
BILIRUB SERPL-MCNC: 0.66 MG/DL
BILIRUB UR QL STRIP: NEGATIVE
BUN SERPL-MCNC: 10 MG/DL (ref 5–25)
CALCIUM SERPL-MCNC: 9.5 MG/DL (ref 8.4–10.2)
CARDIAC TROPONIN I PNL SERPL HS: 3 NG/L
CHLORIDE SERPL-SCNC: 106 MMOL/L (ref 97–108)
CLARITY UR: CLEAR
CO2 SERPL-SCNC: 29 MMOL/L (ref 22–30)
COLOR UR: NORMAL
CREAT SERPL-MCNC: 0.88 MG/DL (ref 0.6–1.2)
EOSINOPHIL # BLD AUTO: 0.02 THOUSAND/ΜL (ref 0–0.61)
EOSINOPHIL NFR BLD AUTO: 0 % (ref 0–6)
ERYTHROCYTE [DISTWIDTH] IN BLOOD BY AUTOMATED COUNT: 15.3 % (ref 11.6–15.1)
GFR SERPL CREATININE-BSD FRML MDRD: 71 ML/MIN/1.73SQ M
GLUCOSE SERPL-MCNC: 90 MG/DL (ref 70–99)
GLUCOSE UR STRIP-MCNC: NEGATIVE MG/DL
HCT VFR BLD AUTO: 40.6 % (ref 34.8–46.1)
HGB BLD-MCNC: 13.5 G/DL (ref 11.5–15.4)
HGB UR QL STRIP.AUTO: NEGATIVE
IMM GRANULOCYTES # BLD AUTO: 0.02 THOUSAND/UL (ref 0–0.2)
IMM GRANULOCYTES NFR BLD AUTO: 0 % (ref 0–2)
KETONES UR STRIP-MCNC: NEGATIVE MG/DL
LEUKOCYTE ESTERASE UR QL STRIP: NEGATIVE
LYMPHOCYTES # BLD AUTO: 1.99 THOUSANDS/ΜL (ref 0.6–4.47)
LYMPHOCYTES NFR BLD AUTO: 31 % (ref 14–44)
MCH RBC QN AUTO: 30 PG (ref 26.8–34.3)
MCHC RBC AUTO-ENTMCNC: 33.3 G/DL (ref 31.4–37.4)
MCV RBC AUTO: 90 FL (ref 82–98)
MONOCYTES # BLD AUTO: 0.34 THOUSAND/ΜL (ref 0.17–1.22)
MONOCYTES NFR BLD AUTO: 5 % (ref 4–12)
NEUTROPHILS # BLD AUTO: 3.95 THOUSANDS/ΜL (ref 1.85–7.62)
NEUTS SEG NFR BLD AUTO: 63 % (ref 43–75)
NITRITE UR QL STRIP: NEGATIVE
NRBC BLD AUTO-RTO: 0 /100 WBCS
NT-PROBNP SERPL-MCNC: 60.9 PG/ML (ref 0–299)
P AXIS: 41 DEGREES
PH UR STRIP.AUTO: 6 [PH]
PLATELET # BLD AUTO: 289 THOUSANDS/UL (ref 149–390)
PMV BLD AUTO: 10.1 FL (ref 8.9–12.7)
POTASSIUM SERPL-SCNC: 3.6 MMOL/L (ref 3.6–5)
PR INTERVAL: 154 MS
PROT SERPL-MCNC: 8.6 G/DL (ref 5.9–8.4)
PROT UR STRIP-MCNC: NEGATIVE MG/DL
QRS AXIS: 11 DEGREES
QRSD INTERVAL: 66 MS
QT INTERVAL: 410 MS
QTC INTERVAL: 395 MS
RBC # BLD AUTO: 4.5 MILLION/UL (ref 3.81–5.12)
SODIUM SERPL-SCNC: 141 MMOL/L (ref 137–147)
SP GR UR STRIP.AUTO: 1.01 (ref 1–1.04)
T WAVE AXIS: 148 DEGREES
UROBILINOGEN UA: NEGATIVE MG/DL
VENTRICULAR RATE: 56 BPM
WBC # BLD AUTO: 6.35 THOUSAND/UL (ref 4.31–10.16)

## 2022-04-28 PROCEDURE — 96374 THER/PROPH/DIAG INJ IV PUSH: CPT

## 2022-04-28 PROCEDURE — 93010 ELECTROCARDIOGRAM REPORT: CPT | Performed by: INTERNAL MEDICINE

## 2022-04-28 PROCEDURE — G1004 CDSM NDSC: HCPCS

## 2022-04-28 PROCEDURE — 85025 COMPLETE CBC W/AUTO DIFF WBC: CPT | Performed by: PHYSICIAN ASSISTANT

## 2022-04-28 PROCEDURE — 84484 ASSAY OF TROPONIN QUANT: CPT | Performed by: PHYSICIAN ASSISTANT

## 2022-04-28 PROCEDURE — 83880 ASSAY OF NATRIURETIC PEPTIDE: CPT | Performed by: PHYSICIAN ASSISTANT

## 2022-04-28 PROCEDURE — 80053 COMPREHEN METABOLIC PANEL: CPT | Performed by: PHYSICIAN ASSISTANT

## 2022-04-28 PROCEDURE — 96375 TX/PRO/DX INJ NEW DRUG ADDON: CPT

## 2022-04-28 PROCEDURE — 93005 ELECTROCARDIOGRAM TRACING: CPT

## 2022-04-28 PROCEDURE — 99284 EMERGENCY DEPT VISIT MOD MDM: CPT

## 2022-04-28 PROCEDURE — 70450 CT HEAD/BRAIN W/O DYE: CPT

## 2022-04-28 PROCEDURE — 36415 COLL VENOUS BLD VENIPUNCTURE: CPT | Performed by: PHYSICIAN ASSISTANT

## 2022-04-28 PROCEDURE — 71046 X-RAY EXAM CHEST 2 VIEWS: CPT

## 2022-04-28 PROCEDURE — 99285 EMERGENCY DEPT VISIT HI MDM: CPT | Performed by: PHYSICIAN ASSISTANT

## 2022-04-28 RX ORDER — DIPHENHYDRAMINE HYDROCHLORIDE 50 MG/ML
25 INJECTION INTRAMUSCULAR; INTRAVENOUS ONCE
Status: COMPLETED | OUTPATIENT
Start: 2022-04-28 | End: 2022-04-28

## 2022-04-28 RX ORDER — METOCLOPRAMIDE HYDROCHLORIDE 5 MG/ML
10 INJECTION INTRAMUSCULAR; INTRAVENOUS ONCE
Status: COMPLETED | OUTPATIENT
Start: 2022-04-28 | End: 2022-04-28

## 2022-04-28 RX ADMIN — METOCLOPRAMIDE 10 MG: 5 INJECTION, SOLUTION INTRAMUSCULAR; INTRAVENOUS at 11:29

## 2022-04-28 RX ADMIN — DIPHENHYDRAMINE HYDROCHLORIDE 25 MG: 50 INJECTION, SOLUTION INTRAMUSCULAR; INTRAVENOUS at 11:29

## 2022-04-28 NOTE — ED PROVIDER NOTES
History  Chief Complaint   Patient presents with    Headache     past 3 days, no other symptoms, has not taken her blood pressure meds in 5 months     77-year-old female reports past medical history significant for hyperlipidemia, hypertension, anxiety, diabetes, chronic pain and depression presents today for evaluation of palpitations, headache and high blood pressure over the past few days  Patient states she has not been taking her htn medications x 3 days  Patient reports she has not had any chest pain  Patient reports palpitations however denies any difficulty breathing, coughing or congestion  History provided by:  Patient   used: No    Headache  Pain location:  Generalized  Quality:  Dull  Radiates to:  Does not radiate  Onset quality:  Gradual  Timing:  Constant  Progression:  Unchanged  Chronicity:  New  Similar to prior headaches: yes    Relieved by:  None tried  Worsened by:  Nothing  Ineffective treatments:  None tried  Associated symptoms: no abdominal pain, no congestion, no dizziness, no ear pain, no fatigue, no fever, no nausea, no numbness, no sore throat and no vomiting        Prior to Admission Medications   Prescriptions Last Dose Informant Patient Reported? Taking?    amLODIPine (NORVASC) 10 mg tablet   No No   Sig: Take 1 tablet (10 mg total) by mouth daily for 21 days   cyclobenzaprine (FLEXERIL) 10 mg tablet   No No   Sig: Take 1 tablet (10 mg total) by mouth 2 (two) times a day as needed for muscle spasms   enalapril (VASOTEC) 10 mg tablet   No No   Sig: Take 1 tablet (10 mg total) by mouth daily for 21 days   hydrochlorothiazide (HYDRODIURIL) 25 mg tablet   No No   Sig: Take 1 tablet (25 mg total) by mouth daily for 21 days      Facility-Administered Medications: None       Past Medical History:   Diagnosis Date    Anxiety     Chronic pain     back pain     Depression     Diabetes mellitus ([facility] [address] )     Disease of thyroid gland     hypothyroid     Hyperlipidemia  Hypertension     Psychiatric disorder        Past Surgical History:   Procedure Laterality Date    DENTAL SURGERY      HYSTERECTOMY         History reviewed  No pertinent family history  I have reviewed and agree with the history as documented  E-Cigarette/Vaping     E-Cigarette/Vaping Substances     Social History     Tobacco Use    Smoking status: Current Every Day Smoker     Packs/day: 1 00     Types: Cigarettes    Smokeless tobacco: Never Used   Substance Use Topics    Alcohol use: Yes     Comment: socially     Drug use: No       Review of Systems   Constitutional: Negative for chills, fatigue and fever  HENT: Negative for congestion, ear pain, rhinorrhea and sore throat  Eyes: Negative for redness  Respiratory: Negative for chest tightness and shortness of breath  Cardiovascular: Positive for palpitations  Negative for chest pain  Gastrointestinal: Negative for abdominal pain, nausea and vomiting  Genitourinary: Negative for dysuria and hematuria  Musculoskeletal: Negative  Skin: Negative for rash  Neurological: Positive for headaches  Negative for dizziness, syncope, light-headedness and numbness  Psychiatric/Behavioral: The patient is nervous/anxious  Physical Exam  Physical Exam  Vitals and nursing note reviewed  Constitutional:       Appearance: She is well-developed  HENT:      Head: Normocephalic  Eyes:      General: No scleral icterus  Cardiovascular:      Rate and Rhythm: Normal rate and regular rhythm  Pulmonary:      Effort: Pulmonary effort is normal       Breath sounds: Normal breath sounds  No stridor  Abdominal:      General: There is no distension  Palpations: Abdomen is soft  Tenderness: There is no abdominal tenderness  Musculoskeletal:         General: Normal range of motion  Skin:     General: Skin is warm and dry  Capillary Refill: Capillary refill takes less than 2 seconds     Neurological:      Mental Status: She is alert and oriented to person, place, and time  Comments: GCS 15  AAOx4  No focal neuro deficits  CN II-XII intact  PERRL  EOMI  No pronator drift   strength 5/5 bilaterally  B/L UE strength 5/5 throughout  Finger to nose, heel shin, rapid alternating movements Cerebellar function normal  Ambulates without difficulty  B/L LE strength 5/5 throughout   Gross sensation to b/l upper and lower extremities intact              Vital Signs  ED Triage Vitals [04/28/22 1042]   Temperature Pulse Respirations Blood Pressure SpO2   98 5 °F (36 9 °C) 67 18 (!) 175/109 99 %      Temp Source Heart Rate Source Patient Position - Orthostatic VS BP Location FiO2 (%)   Oral Monitor Sitting Left arm --      Pain Score       10 - Worst Possible Pain           Vitals:    04/28/22 1042 04/28/22 1233   BP: (!) 175/109 157/100   Pulse: 67 55   Patient Position - Orthostatic VS: Sitting Lying         Visual Acuity      ED Medications  Medications   metoclopramide (REGLAN) injection 10 mg (10 mg Intravenous Given 4/28/22 1129)   diphenhydrAMINE (BENADRYL) injection 25 mg (25 mg Intravenous Given 4/28/22 1129)       Diagnostic Studies  Results Reviewed     Procedure Component Value Units Date/Time    HS Troponin I 4hr [[id number]]     Lab Status: No result Specimen: Blood     NT-BNP PRO [[id number]]  (Normal) Collected: 04/28/22 1127    Lab Status: Final result Specimen: Blood from Arm, Right Updated: 04/28/22 1202     NT-proBNP 60 9 pg/mL     HS Troponin 0hr (reflex protocol) [[id number]]  (Normal) Collected: 04/28/22 1127    Lab Status: Final result Specimen: Blood from Arm, Right Updated: 04/28/22 1200     hs TnI 0hr 3 ng/L     HS Troponin I 2hr [[id number]]     Lab Status: No result Specimen: Blood     Comprehensive metabolic panel [[id number]]  (Abnormal) Collected: 04/28/22 1127    Lab Status: Final result Specimen: Blood from Arm, Right Updated: 04/28/22 1153     Sodium 141 mmol/L      Potassium 3 6 mmol/L      Chloride 106 mmol/L CO2 29 mmol/L      ANION GAP 6 mmol/L      BUN 10 mg/dL      Creatinine 0 88 mg/dL      Glucose 90 mg/dL      Calcium 9 5 mg/dL      AST 23 U/L      ALT 12 U/L      Alkaline Phosphatase 67 U/L      Total Protein 8 6 g/dL      Albumin 4 8 g/dL      Total Bilirubin 0 66 mg/dL      eGFR 71 ml/min/1 73sq m     Narrative:      National Kid[employer] Disease Foundation guidelines for Chronic Kidney Disease (CKD):     Stage 1 with normal or high GFR (GFR > 90 mL/min/1 73 square meters)    Stage 2 Mild CKD (GFR = 60-89 mL/min/1 73 square meters)    Stage 3A Moderate CKD (GFR = 45-59 mL/min/1 73 square meters)    Stage 3B Moderate CKD (GFR = 30-44 mL/min/1 73 square meters)    Stage 4 Severe CKD (GFR = 15-29 mL/min/1 73 square meters)    Stage 5 End Stage CKD (GFR <15 mL/min/1 73 square meters)  Note: GFR calculation is accurate only with a steady state creatinine    UA (URINE) with reflex to Scope [[id number]]  (Normal) Collected: 04/28/22 1118    Lab Status: Final result Specimen: Urine, Clean Catch Updated: 04/28/22 1142     Color, UA Straw     Clarity, UA Clear     Specific Gravity, UA 1 010     pH, UA 6 0     Leukocytes, UA Negative     Nitrite, UA Negative     Protein, UA Negative mg/dl      Glucose, UA Negative mg/dl      Ketones, UA Negative mg/dl      Bilirubin, UA Negative     Blood, UA Negative     UROBILINOGEN UA Negative mg/dL     CBC and differential [[id number]]  (Abnormal) Collected: 04/28/22 1127    Lab Status: Final result Specimen: Blood from Arm, Right Updated: 04/28/22 1139     WBC 6 35 Thousand/uL      RBC 4 50 Million/uL      Hemoglobin 13 5 g/dL      Hematocrit 40 6 %      MCV 90 fL      MCH 30 0 pg      MCHC 33 3 g/dL      RDW 15 3 %      MPV 10 1 fL      Platelets 532 Thousands/uL      nRBC 0 /100 WBCs      Neutrophils Relative 63 %      Immat GRANS % 0 %      Lymphocytes Relative 31 %      Monocytes Relative 5 %      Eosinophils Relative 0 %      Basophils Relative 1 %      Neutrophils Absolute 3 95 Thousands/µL      Immature Grans Absolute 0 02 Thousand/uL      Lymphocytes Absolute 1 99 Thousands/µL      Monocytes Absolute 0 34 Thousand/µL      Eosinophils Absolute 0 02 Thousand/µL      Basophils Absolute 0 03 Thousands/µL                  CT head without contrast   Final Result by Jan Tay MD (04/28 1202)      No acute intracranial abnormality  Workstation performed: [id number]         XR chest 2 views    (Results Pending)              Procedures  ECG 12 Lead Documentation Only    Date/Time: 4/28/2022 12:59 PM  Performed by: Crissy Soto PA-C  Authorized by: Crissy Soto PA-C     Indications / Diagnosis:  Headache / htn  ECG reviewed by me, the ED Provider: yes    Patient location:  ED  Previous ECG:     Comparison to cardiac monitor: Yes    Interpretation:     Interpretation: normal    Rate:     ECG rate:  56    ECG rate assessment: bradycardic    Rhythm:     Rhythm: sinus bradycardia    Ectopy:     Ectopy: none    QRS:     QRS axis:  Left    QRS intervals:  Normal  Conduction:     Conduction: normal    ST segments:     ST segments:  Normal  T waves:     T waves: normal    Comments:        QRS 66                 ED Course  ED Course as of 04/28/22 1300   Thu Apr 28, 2022   1258 Patient was reexamined at this time and informed of laboratory and/or imaging results and was found to be stable for discharge  Return to emergency department criteria was reviewed with the patient who verbalized understanding and was agreeable to discharge and the treatment plan at this time                   HEART Risk Score      Most Recent Value   Heart Score Risk Calculator    History 0 Filed at: 04/28/2022 1300   ECG 0 Filed at: 04/28/2022 1300   Age 1 Filed at: 04/28/2022 1300   Risk Factors 2 Filed at: 04/28/2022 1300   Troponin 0 Filed at: 04/28/2022 1300   HEART Score 3 Filed at: 04/28/2022 1300                        SBIRT 20yo+      Most Recent Value   SBIRT (24 yo +)    In order to provide better care to our patients, we are screening all of our patients for alcohol and drug use  Would it be okay to ask you these screening questions? No Filed at: 04/28/2022 1048                    MDM  Number of Diagnoses or Management Options  Diagnosis management comments: All imaging and/or lab testing discussed with patient, strict return to ED precautions discussed  Patient recommended to follow up promptly with appropriate outpatient provider  Patient and/or family members verbalizes understanding and agrees with plan  Patient is stable for discharge      Portions of the record may have been created with voice recognition software  Occasional wrong word or "sound a like" substitutions may have occurred due to the inherent limitations of voice recognition software  Read the chart carefully and recognize, using context, where substitutions have occurred  Disposition  Final diagnoses:   Headache   HTN (hypertension)   History of anxiety     Time reflects when diagnosis was documented in both MDM as applicable and the Disposition within this note     Time User Action Codes Description Comment    4/28/2022 12:58 PM Lelon Mems Add [R51 9] Headache     4/28/2022 12:58 PM Pham Gill HTN (hypertension)     4/28/2022 12:58 PM Lelon Mems Add [Z86 [id number]] History of anxiety       ED Disposition     ED Disposition Condition Date/Time Comment    Discharge Good Thu Apr 28, 2022 12:58 PM Dave Jason discharge to home/self care  Follow-up Information     Follow up With Specialties Details Why [id number] [facility] Schedule an appointment as soon as possible for a visit  As needed [place] [id number] [phone]            Patient's Medications   Discharge Prescriptions    No medications on file       No discharge procedures on file      PDMP Review     None          ED Provider  Electronically Signed by           Crissy oSto PA-C  04/28/22 1300

## 2022-04-28 NOTE — DISCHARGE INSTRUCTIONS
Please continue taking your blood pressure medication and follow up with your family care provider  Return to the ER for any worsening symptoms, chest pain, difficulty breathing, or passing out

## 2022-05-01 ENCOUNTER — HOSPITAL ENCOUNTER (EMERGENCY)
Facility: HOSPITAL | Age: 62
Discharge: HOME | End: 2022-05-01
Attending: EMERGENCY MEDICINE
Payer: COMMERCIAL

## 2022-05-01 ENCOUNTER — APPOINTMENT (EMERGENCY)
Dept: CT IMAGING | Facility: HOSPITAL | Age: 62
End: 2022-05-01
Payer: COMMERCIAL

## 2022-05-01 ENCOUNTER — APPOINTMENT (EMERGENCY)
Dept: RADIOLOGY | Facility: HOSPITAL | Age: 62
End: 2022-05-01
Payer: COMMERCIAL

## 2022-05-01 VITALS
HEART RATE: 72 BPM | OXYGEN SATURATION: 99 % | RESPIRATION RATE: 16 BRPM | DIASTOLIC BLOOD PRESSURE: 85 MMHG | TEMPERATURE: 96.3 F | WEIGHT: 172 LBS | SYSTOLIC BLOOD PRESSURE: 142 MMHG | BODY MASS INDEX: 29.52 KG/M2

## 2022-05-01 DIAGNOSIS — I10 HYPERTENSION: ICD-10-CM

## 2022-05-01 DIAGNOSIS — R51.9 HEADACHE: Primary | ICD-10-CM

## 2022-05-01 LAB
ALBUMIN SERPL BCP-MCNC: 4.9 G/DL (ref 3–5.2)
ALP SERPL-CCNC: 65 U/L (ref 43–122)
ALT SERPL W P-5'-P-CCNC: 17 U/L
ANION GAP SERPL CALCULATED.3IONS-SCNC: 10 MMOL/L (ref 5–14)
AST SERPL W P-5'-P-CCNC: 29 U/L (ref 14–36)
ATRIAL RATE: 74 BPM
BASOPHILS # BLD AUTO: 0.04 THOUSANDS/ΜL (ref 0–0.1)
BASOPHILS NFR BLD AUTO: 1 % (ref 0–1)
BILIRUB SERPL-MCNC: 0.56 MG/DL
BUN SERPL-MCNC: 16 MG/DL (ref 5–25)
CALCIUM SERPL-MCNC: 10 MG/DL (ref 8.4–10.2)
CARDIAC TROPONIN I PNL SERPL HS: 3 NG/L
CHLORIDE SERPL-SCNC: 107 MMOL/L (ref 97–108)
CO2 SERPL-SCNC: 25 MMOL/L (ref 22–30)
CREAT SERPL-MCNC: 1 MG/DL (ref 0.6–1.2)
EOSINOPHIL # BLD AUTO: 0.06 THOUSAND/ΜL (ref 0–0.61)
EOSINOPHIL NFR BLD AUTO: 1 % (ref 0–6)
ERYTHROCYTE [DISTWIDTH] IN BLOOD BY AUTOMATED COUNT: 15.5 % (ref 11.6–15.1)
GFR SERPL CREATININE-BSD FRML MDRD: 60 ML/MIN/1.73SQ M
GLUCOSE SERPL-MCNC: 82 MG/DL (ref 70–99)
HCT VFR BLD AUTO: 41.1 % (ref 34.8–46.1)
HGB BLD-MCNC: 13.4 G/DL (ref 11.5–15.4)
IMM GRANULOCYTES # BLD AUTO: 0.02 THOUSAND/UL (ref 0–0.2)
IMM GRANULOCYTES NFR BLD AUTO: 0 % (ref 0–2)
LYMPHOCYTES # BLD AUTO: 2.9 THOUSANDS/ΜL (ref 0.6–4.47)
LYMPHOCYTES NFR BLD AUTO: 35 % (ref 14–44)
MCH RBC QN AUTO: 29.8 PG (ref 26.8–34.3)
MCHC RBC AUTO-ENTMCNC: 32.6 G/DL (ref 31.4–37.4)
MCV RBC AUTO: 92 FL (ref 82–98)
MONOCYTES # BLD AUTO: 0.51 THOUSAND/ΜL (ref 0.17–1.22)
MONOCYTES NFR BLD AUTO: 6 % (ref 4–12)
NEUTROPHILS # BLD AUTO: 4.81 THOUSANDS/ΜL (ref 1.85–7.62)
NEUTS SEG NFR BLD AUTO: 57 % (ref 43–75)
NRBC BLD AUTO-RTO: 0 /100 WBCS
P AXIS: 50 DEGREES
PLATELET # BLD AUTO: 286 THOUSANDS/UL (ref 149–390)
PMV BLD AUTO: 11.1 FL (ref 8.9–12.7)
POTASSIUM SERPL-SCNC: 3.8 MMOL/L (ref 3.6–5)
PR INTERVAL: 148 MS
PROT SERPL-MCNC: 8.7 G/DL (ref 5.9–8.4)
QRS AXIS: 40 DEGREES
QRSD INTERVAL: 72 MS
QT INTERVAL: 394 MS
QTC INTERVAL: 437 MS
RBC # BLD AUTO: 4.49 MILLION/UL (ref 3.81–5.12)
SODIUM SERPL-SCNC: 142 MMOL/L (ref 137–147)
T WAVE AXIS: 98 DEGREES
VENTRICULAR RATE: 74 BPM
WBC # BLD AUTO: 8.34 THOUSAND/UL (ref 4.31–10.16)

## 2022-05-01 PROCEDURE — 96361 HYDRATE IV INFUSION ADD-ON: CPT

## 2022-05-01 PROCEDURE — 84484 ASSAY OF TROPONIN QUANT: CPT | Performed by: EMERGENCY MEDICINE

## 2022-05-01 PROCEDURE — 93010 ELECTROCARDIOGRAM REPORT: CPT

## 2022-05-01 PROCEDURE — 93005 ELECTROCARDIOGRAM TRACING: CPT

## 2022-05-01 PROCEDURE — 85025 COMPLETE CBC W/AUTO DIFF WBC: CPT | Performed by: EMERGENCY MEDICINE

## 2022-05-01 PROCEDURE — G1004 CDSM NDSC: HCPCS

## 2022-05-01 PROCEDURE — 99284 EMERGENCY DEPT VISIT MOD MDM: CPT

## 2022-05-01 PROCEDURE — 70450 CT HEAD/BRAIN W/O DYE: CPT

## 2022-05-01 PROCEDURE — 96374 THER/PROPH/DIAG INJ IV PUSH: CPT

## 2022-05-01 PROCEDURE — 80053 COMPREHEN METABOLIC PANEL: CPT | Performed by: EMERGENCY MEDICINE

## 2022-05-01 PROCEDURE — 36415 COLL VENOUS BLD VENIPUNCTURE: CPT | Performed by: EMERGENCY MEDICINE

## 2022-05-01 PROCEDURE — 99285 EMERGENCY DEPT VISIT HI MDM: CPT | Performed by: EMERGENCY MEDICINE

## 2022-05-01 PROCEDURE — 71046 X-RAY EXAM CHEST 2 VIEWS: CPT

## 2022-05-01 RX ORDER — HYDRALAZINE HYDROCHLORIDE 20 MG/ML
10 INJECTION INTRAMUSCULAR; INTRAVENOUS ONCE
Status: COMPLETED | OUTPATIENT
Start: 2022-05-01 | End: 2022-05-01

## 2022-05-01 RX ORDER — ACETAMINOPHEN 325 MG/1
650 TABLET ORAL ONCE
Status: COMPLETED | OUTPATIENT
Start: 2022-05-01 | End: 2022-05-01

## 2022-05-01 RX ADMIN — HYDRALAZINE HYDROCHLORIDE 10 MG: 20 INJECTION, SOLUTION INTRAMUSCULAR; INTRAVENOUS at 01:09

## 2022-05-01 RX ADMIN — ACETAMINOPHEN 650 MG: 325 TABLET ORAL at 01:57

## 2022-05-01 RX ADMIN — SODIUM CHLORIDE 1000 ML: 0.9 INJECTION, SOLUTION INTRAVENOUS at 01:09

## 2022-05-01 NOTE — ED PROVIDER NOTES
History  Chief Complaint   Patient presents with    Headache     Pt reports several minutes ago she started to have L temple pain and then a nosebleed that lasted a few minutes  Pt reports still having the headache but nose bleed has stopped  79-year-old female presents with complaint of elevated blood pressure, headaches, and nose bleed  The patient had been off of her blood pressure medication for several months and just resumed it a couple days ago  She has had left-sided headaches association with her elevated blood pressure  She also reports having a nosebleed earlier today that has since resolved  She denies chest pain but states that she has a chronic history of intermittent palpitations  Hypertension  Severity:  Moderate  Onset quality:  Gradual  Timing:  Intermittent  Progression:  Partially resolved  Chronicity:  Recurrent  Relieved by:  Nothing  Worsened by:  Nothing  Ineffective treatments:  None tried  Associated symptoms: epistaxis (resolved), headaches and palpitations    Associated symptoms: no abdominal pain, no chest pain, no dizziness, no fatigue, no fever, no nausea, no shortness of breath and not vomiting        Prior to Admission Medications   Prescriptions Last Dose Informant Patient Reported? Taking?    amLODIPine (NORVASC) 10 mg tablet   No No   Sig: Take 1 tablet (10 mg total) by mouth daily for 21 days   cyclobenzaprine (FLEXERIL) 10 mg tablet   No No   Sig: Take 1 tablet (10 mg total) by mouth 2 (two) times a day as needed for muscle spasms   enalapril (VASOTEC) 10 mg tablet   No No   Sig: Take 1 tablet (10 mg total) by mouth daily for 21 days   hydrochlorothiazide (HYDRODIURIL) 25 mg tablet   No No   Sig: Take 1 tablet (25 mg total) by mouth daily for 21 days      Facility-Administered Medications: None       Past Medical History:   Diagnosis Date    Anxiety     Chronic pain     back pain     Depression     Diabetes mellitus ([facility] [address] )     Disease of thyroid gland hypothyroid     Hyperlipidemia     Hypertension     Psychiatric disorder        Past Surgical History:   Procedure Laterality Date    DENTAL SURGERY      HYSTERECTOMY         History reviewed  No pertinent family history  I have reviewed and agree with the history as documented  E-Cigarette/Vaping     E-Cigarette/Vaping Substances    Nicotine No     THC No     CBD No     Flavoring No     Other No     Unknown No      Social History     Tobacco Use    Smoking status: Current Every Day Smoker     Packs/day: 1 00     Types: Cigarettes    Smokeless tobacco: Never Used   Vaping Use    Vaping Use: Not on file   Substance Use Topics    Alcohol use: Yes     Comment: socially     Drug use: No       Review of Systems   Constitutional: Negative for appetite change, chills, fatigue and fever  HENT: Positive for nosebleeds (resolved)  Negative for postnasal drip, sinus pain and trouble swallowing  Eyes: Negative for redness and itching  Respiratory: Negative for chest tightness, shortness of breath and wheezing  Cardiovascular: Positive for palpitations  Negative for chest pain and leg swelling  Gastrointestinal: Negative for abdominal pain, constipation, diarrhea, nausea and vomiting  Endocrine: Negative  Genitourinary: Negative for difficulty urinating and dysuria  Musculoskeletal: Negative for back pain and myalgias  Skin: Negative for rash  Allergic/Immunologic: Negative  Neurological: Positive for headaches  Negative for dizziness and numbness  Hematological: Negative  Psychiatric/Behavioral: Negative  All other systems reviewed and are negative  Physical Exam  Physical Exam  Vitals and nursing note reviewed  Constitutional:       General: She is not in acute distress  Appearance: Normal appearance  She is well-developed  She is not ill-appearing, toxic-appearing or diaphoretic  HENT:      Head: Normocephalic and atraumatic        Right Ear: External ear normal       Left Ear: External ear normal       Nose: Nose normal  No mucosal edema, congestion or rhinorrhea  Right Nostril: No epistaxis or septal hematoma  Left Nostril: No epistaxis or septal hematoma  Comments: Evidence of recent nose bleed the left side of the septum in the area of Kiesselbach's plexus  Mouth/Throat:      Mouth: Mucous membranes are moist       Pharynx: Oropharynx is clear  Eyes:      Conjunctiva/sclera: Conjunctivae normal       Pupils: Pupils are equal, round, and reactive to light  Cardiovascular:      Rate and Rhythm: Normal rate and regular rhythm  Heart sounds: Normal heart sounds  Pulmonary:      Effort: Pulmonary effort is normal  No respiratory distress  Breath sounds: Normal breath sounds  No wheezing  Abdominal:      General: Bowel sounds are normal       Palpations: Abdomen is soft  Tenderness: There is no abdominal tenderness  There is no guarding  Musculoskeletal:         General: No tenderness or deformity  Cervical back: Normal range of motion and neck supple  No rigidity  Skin:     General: Skin is warm and dry  Capillary Refill: Capillary refill takes less than 2 seconds  Findings: No rash  Neurological:      General: No focal deficit present  Mental Status: She is alert and oriented to person, place, and time  Cranial Nerves: No cranial nerve deficit  Sensory: No sensory deficit  Motor: No weakness        Gait: Gait normal    Psychiatric:         Mood and Affect: Mood normal          Behavior: Behavior normal          Vital Signs  ED Triage Vitals [05/01/22 0057]   Temperature Pulse Respirations Blood Pressure SpO2   (!) 96 3 °F (35 7 °C) 81 18 (!) 160/116 97 %      Temp Source Heart Rate Source Patient Position - Orthostatic VS BP Location FiO2 (%)   Tympanic Monitor Sitting Left arm --      Pain Score       10 - Worst Possible Pain           Vitals:    05/01/22 0057 05/01/22 0151   BP: (!) 160/116 142/85   Pulse: 81 72   Patient Position - Orthostatic VS: Sitting Sitting         Visual Acuity      ED Medications  Medications   sodium chloride 0 9 % bolus 1,000 mL (0 mL Intravenous Stopped 5/1/22 0225)   hydrALAZINE (APRESOLINE) injection 10 mg (10 mg Intravenous Given 5/1/22 0109)   acetaminophen (TYLENOL) tablet 650 mg (650 mg Oral Given 5/1/22 0157)       Diagnostic Studies  Results Reviewed     Procedure Component Value Units Date/Time    HS Troponin 0hr (reflex protocol) [[id number]]  (Normal) Collected: 05/01/22 0115    Lab Status: Final result Specimen: Blood from Arm, Right Updated: 05/01/22 0151     hs TnI 0hr 3 ng/L     Comprehensive metabolic panel [[id number]]  (Abnormal) Collected: 05/01/22 0115    Lab Status: Final result Specimen: Blood from Arm, Right Updated: 05/01/22 0140     Sodium 142 mmol/L      Potassium 3 8 mmol/L      Chloride 107 mmol/L      CO2 25 mmol/L      ANION GAP 10 mmol/L      BUN 16 mg/dL      Creatinine 1 00 mg/dL      Glucose 82 mg/dL      Calcium 10 0 mg/dL      AST 29 U/L      ALT 17 U/L      Alkaline Phosphatase 65 U/L      Total Protein 8 7 g/dL      Albumin 4 9 g/dL      Total Bilirubin 0 56 mg/dL      eGFR 60 ml/min/1 73sq m     Narrative:      Hemolysis  National Kidney Disease Foundation guidelines for Chronic Kidney Disease (CKD):     Stage 1 with normal or high GFR (GFR > 90 mL/min/1 73 square meters)    Stage 2 Mild CKD (GFR = 60-89 mL/min/1 73 square meters)    Stage 3A Moderate CKD (GFR = 45-59 mL/min/1 73 square meters)    Stage 3B Moderate CKD (GFR = 30-44 mL/min/1 73 square meters)    Stage 4 Severe CKD (GFR = 15-29 mL/min/1 73 square meters)    Stage 5 End Stage CKD (GFR <15 mL/min/1 73 square meters)  Note: GFR calculation is accurate only with a steady state creatinine    CBC and differential [[id number]]  (Abnormal) Collected: 05/01/22 0115    Lab Status: Final result Specimen: Blood from Arm, Right Updated: 05/01/22 0128     WBC 8 34 Thousand/uL      RBC 4 49 Million/uL      Hemoglobin 13 4 g/dL      Hematocrit 41 1 %      MCV 92 fL      MCH 29 8 pg      MCHC 32 6 g/dL      RDW 15 5 %      MPV 11 1 fL      Platelets 241 Thousands/uL      nRBC 0 /100 WBCs      Neutrophils Relative 57 %      Immat GRANS % 0 %      Lymphocytes Relative 35 %      Monocytes Relative 6 %      Eosinophils Relative 1 %      Basophils Relative 1 %      Neutrophils Absolute 4 81 Thousands/µL      Immature Grans Absolute 0 02 Thousand/uL      Lymphocytes Absolute 2 90 Thousands/µL      Monocytes Absolute 0 51 Thousand/µL      Eosinophils Absolute 0 06 Thousand/µL      Basophils Absolute 0 04 Thousands/µL                  CT head without contrast   Final Result by Sofia Green DO (05/01 0136)      No acute intracranial abnormality  Workstation performed: [id number]         XR chest 2 views    (Results Pending)              Procedures  ECG 12 Lead Documentation Only    Date/Time: 5/1/2022 1:53 AM  Performed by: Pita Dean DO  Authorized by: Pita Dean DO     ECG reviewed by me, the ED Provider: yes    Patient location:  ED  Rate:     ECG rate:  74    ECG rate assessment: normal    Rhythm:     Rhythm: sinus rhythm    Ectopy:     Ectopy: none    QRS:     QRS axis:  Normal  Conduction:     Conduction: normal    ST segments:     ST segments:  Normal  T waves:     T waves: non-specific               ED Course                               SBIRT 20yo+      Most Recent Value   SBIRT (22 yo +)    In order to provide better care to our patients, we are screening all of our patients for alcohol and drug use  Would it be okay to ask you these screening questions? Yes Filed at: 05/01/2022 0150   Initial Alcohol Screen: US AUDIT-C     1  How often do you have a drink containing alcohol? 0 Filed at: 05/01/2022 0150   2  How many drinks containing alcohol do you have on a typical day you are drinking? 0 Filed at: 05/01/2022 0150   3b   FEMALE Any Age, or MALE 65+: How often do you have 4 or more drinks on one occassion? 0 Filed at: 05/01/2022 0150   Audit-C Score 0 Filed at: 05/01/2022 0150   VANIA: How many times in the past year have you    Used an illegal drug or used a prescription medication for non-medical reasons? Never Filed at: 05/01/2022 0150                    MDM  Number of Diagnoses or Management Options  Headache  Hypertension  Diagnosis management comments: 49-year-old female presents with complaint of headaches in the setting of hypertension  She recently began taking her blood pressure medication again  She reports having had a nosebleed earlier today which has since resolved  Work appears unremarkable pain improved as her blood pressure trended downward  She will be following up very closely with her primary care provider is aware of reasons return to the ER  Blayne Kramer Amount and/or Complexity of Data Reviewed  Clinical lab tests: ordered and reviewed  Tests in the radiology section of CPT®: reviewed and ordered  Independent visualization of images, tracings, or specimens: yes        Disposition  Final diagnoses:   Headache   Hypertension     Time reflects when diagnosis was documented in both MDM as applicable and the Disposition within this note     Time User Action Codes Description Comment    5/1/2022  1:54 AM Osbaldo Narvaez Add [R51 9] Headache     5/1/2022  1:54 AM Roberto Kaur Hypertension       ED Disposition     ED Disposition Condition Date/Time Comment    Discharge Stable Sun May 1, 2022  1:54 AM Alberto Marin discharge to home/self care              Follow-up Information     Follow up With Specialties Details Why [id number] [facility] Schedule an appointment as soon as possible for a visit   [address] [address] [phone]            Discharge Medication List as of 5/1/2022  1:54 AM      CONTINUE these medications which have NOT CHANGED    Details   amLODIPine (NORVASC) 10 mg tablet Take 1 tablet (10 mg total) by mouth daily for 21 days, Starting Tue 12/17/2019, Until Tue 1/7/2020, Normal      cyclobenzaprine (FLEXERIL) 10 mg tablet Take 1 tablet (10 mg total) by mouth 2 (two) times a day as needed for muscle spasms, Starting Wed 11/6/2019, Print      enalapril (VASOTEC) 10 mg tablet Take 1 tablet (10 mg total) by mouth daily for 21 days, Starting Tue 12/17/2019, Until Tue 1/7/2020, Normal      hydrochlorothiazide (HYDRODIURIL) 25 mg tablet Take 1 tablet (25 mg total) by mouth daily for 21 days, Starting Tue 12/17/2019, Until Tue 1/7/2020, Normal             No discharge procedures on file      PDMP Review     None          ED Provider  Electronically Signed by           Rey Anglin DO  05/01/22 6950

## 2022-06-21 ENCOUNTER — APPOINTMENT (EMERGENCY)
Dept: RADIOLOGY | Facility: HOSPITAL | Age: 62
End: 2022-06-21
Payer: COMMERCIAL

## 2022-06-21 ENCOUNTER — HOSPITAL ENCOUNTER (EMERGENCY)
Facility: HOSPITAL | Age: 62
Discharge: HOME | End: 2022-06-21
Attending: EMERGENCY MEDICINE
Payer: COMMERCIAL

## 2022-06-21 VITALS
HEART RATE: 70 BPM | SYSTOLIC BLOOD PRESSURE: 147 MMHG | DIASTOLIC BLOOD PRESSURE: 95 MMHG | BODY MASS INDEX: 28.51 KG/M2 | TEMPERATURE: 97.6 F | OXYGEN SATURATION: 96 % | RESPIRATION RATE: 16 BRPM | WEIGHT: 166.1 LBS

## 2022-06-21 DIAGNOSIS — M54.50 CHRONIC LOW BACK PAIN WITHOUT SCIATICA, UNSPECIFIED BACK PAIN LATERALITY: ICD-10-CM

## 2022-06-21 DIAGNOSIS — M54.2 NECK PAIN, CHRONIC: ICD-10-CM

## 2022-06-21 DIAGNOSIS — G89.29 NECK PAIN, CHRONIC: ICD-10-CM

## 2022-06-21 DIAGNOSIS — H01.001 BLEPHARITIS OF RIGHT UPPER EYELID, UNSPECIFIED TYPE: Primary | ICD-10-CM

## 2022-06-21 DIAGNOSIS — S86.012A STRAIN OF LEFT ACHILLES TENDON, INITIAL ENCOUNTER: ICD-10-CM

## 2022-06-21 DIAGNOSIS — G89.29 CHRONIC LOW BACK PAIN WITHOUT SCIATICA, UNSPECIFIED BACK PAIN LATERALITY: ICD-10-CM

## 2022-06-21 PROCEDURE — 73610 X-RAY EXAM OF ANKLE: CPT

## 2022-06-21 PROCEDURE — 99284 EMERGENCY DEPT VISIT MOD MDM: CPT | Performed by: PHYSICIAN ASSISTANT

## 2022-06-21 PROCEDURE — 99283 EMERGENCY DEPT VISIT LOW MDM: CPT

## 2022-06-21 RX ORDER — LIDOCAINE 50 MG/G
1 PATCH TOPICAL DAILY
Qty: 7 PATCH | Refills: 0 | Status: SHIPPED | OUTPATIENT
Start: 2022-06-21 | End: 2022-06-28

## 2022-06-21 RX ORDER — CEPHALEXIN 500 MG/1
500 CAPSULE ORAL EVERY 8 HOURS SCHEDULED
Qty: 30 CAPSULE | Refills: 0 | Status: SHIPPED | OUTPATIENT
Start: 2022-06-21 | End: 2022-07-01

## 2022-06-21 RX ORDER — METHOCARBAMOL 500 MG/1
500 TABLET, FILM COATED ORAL 2 TIMES DAILY
Qty: 14 TABLET | Refills: 0 | Status: SHIPPED | OUTPATIENT
Start: 2022-06-21 | End: 2022-10-09

## 2022-06-21 RX ORDER — LIDOCAINE 50 MG/G
1 PATCH TOPICAL ONCE
Status: DISCONTINUED | OUTPATIENT
Start: 2022-06-21 | End: 2022-06-21 | Stop reason: HOSPADM

## 2022-06-21 NOTE — ED PROVIDER NOTES
History  Chief Complaint   Patient presents with    Eye Problem     Pt c/o right eye swelling and pain   Foot Pain     Pt c/o pain to back of left heel  Pt with right eye pain and left foot pain and chronic neck and back pain         Back Pain  Location:  Lumbar spine  Quality:  Aching  Radiates to:  Does not radiate  Pain severity:  Mild  Pain is:  Unable to specify  Onset quality:  Gradual  Duration:  12 months  Timing:  Constant  Progression:  Waxing and waning  Chronicity:  Chronic  Context: not emotional stress    Relieved by:  Nothing  Worsened by: Movement  Ineffective treatments:  None tried  Associated symptoms: no abdominal pain    Risk factors: no hx of cancer        Prior to Admission Medications   Prescriptions Last Dose Informant Patient Reported? Taking? amLODIPine (NORVASC) 10 mg tablet   No No   Sig: Take 1 tablet (10 mg total) by mouth daily for 21 days   cyclobenzaprine (FLEXERIL) 10 mg tablet   No No   Sig: Take 1 tablet (10 mg total) by mouth 2 (two) times a day as needed for muscle spasms   enalapril (VASOTEC) 10 mg tablet   No No   Sig: Take 1 tablet (10 mg total) by mouth daily for 21 days   hydrochlorothiazide (HYDRODIURIL) 25 mg tablet   No No   Sig: Take 1 tablet (25 mg total) by mouth daily for 21 days      Facility-Administered Medications: None       Past Medical History:   Diagnosis Date    Anxiety     Chronic pain     back pain     Depression     Diabetes mellitus ([address] )     Disease of thyroid gland     hypothyroid     Hyperlipidemia     Hypertension     Psychiatric disorder        Past Surgical History:   Procedure Laterality Date    DENTAL SURGERY      HYSTERECTOMY         History reviewed  No pertinent family history  I have reviewed and agree with the history as documented      E-Cigarette/Vaping     E-Cigarette/Vaping Substances    Nicotine No     THC No     CBD No     Flavoring No     Other No     Unknown No      Social History     Tobacco Use    Smoking status: Current Every Day Smoker     Packs/day: 1 00     Types: Cigarettes    Smokeless tobacco: Never Used   Substance Use Topics    Alcohol use: Yes     Comment: socially     Drug use: No       Review of Systems   Constitutional: Negative  HENT: Negative  Eyes: Positive for pain  Respiratory: Negative  Cardiovascular: Negative  Gastrointestinal: Negative for abdominal pain  Endocrine: Negative  Genitourinary: Negative  Musculoskeletal: Positive for back pain and neck pain  Skin: Negative  Allergic/Immunologic: Negative  Neurological: Negative  Hematological: Negative  Psychiatric/Behavioral: Negative  All other systems reviewed and are negative  Physical Exam  Physical Exam  Vitals and nursing note reviewed  Constitutional:       Appearance: Normal appearance  She is normal weight  HENT:      Head: Normocephalic and atraumatic  Right Ear: Tympanic membrane, ear canal and external ear normal       Left Ear: Tympanic membrane, ear canal and external ear normal       Nose: Nose normal       Mouth/Throat:      Mouth: Mucous membranes are moist       Pharynx: Oropharynx is clear  Eyes:      Extraocular Movements: Extraocular movements intact  Conjunctiva/sclera: Conjunctivae normal       Pupils: Pupils are equal, round, and reactive to light  Comments: Right upper lid sweling rigth upper outter lid slight erythema  perrl eom wnl red reflex wnl    Neck:      Comments: Mild paraspinal tenderness   No swelling no erythema   Cardiovascular:      Rate and Rhythm: Normal rate and regular rhythm  Pulses: Normal pulses  Heart sounds: Normal heart sounds  Pulmonary:      Effort: Pulmonary effort is normal       Breath sounds: Normal breath sounds  Abdominal:      General: Abdomen is flat  Bowel sounds are normal       Palpations: Abdomen is soft  Musculoskeletal:         General: Normal range of motion        Cervical back: Normal range of motion and neck supple  Comments: Lumbar paraspinal tenderness no sacroiliac no sciatic ntoch pain dtr wnl great toe ext strong bilat no paresthesias       Left foot achilles nodule swelling and tenderness, no erythmema    Skin:     General: Skin is warm  Capillary Refill: Capillary refill takes less than 2 seconds  Neurological:      General: No focal deficit present  Mental Status: She is alert and oriented to person, place, and time  Psychiatric:         Mood and Affect: Mood normal          Behavior: Behavior normal          Vital Signs  ED Triage Vitals [06/21/22 0935]   Temperature Pulse Respirations Blood Pressure SpO2   97 6 °F (36 4 °C) 70 16 147/95 96 %      Temp Source Heart Rate Source Patient Position - Orthostatic VS BP Location FiO2 (%)   Oral Monitor Sitting Left arm --      Pain Score       5           Vitals:    06/21/22 0935   BP: 147/95   Pulse: 70   Patient Position - Orthostatic VS: Sitting         Visual Acuity      ED Medications  Medications - No data to display    Diagnostic Studies  Results Reviewed     None                 XR ankle 3+ views LEFT   Final Result by Rupinder Espinoza MD (06/21 1120)      No acute osseous abnormality  Workstation performed: [id number]                    Procedures  Procedures         ED Course                                             MDM    Disposition  Final diagnoses:   Blepharitis of right upper eyelid, unspecified type   Strain of left Achilles tendon, initial encounter   Chronic low back pain without sciatica, unspecified back pain laterality   Neck pain, chronic     Time reflects when diagnosis was documented in both MDM as applicable and the Disposition within this note     Time User Action Codes Description Comment    6/21/2022 10:31 AM Heather Suarez  Add [N07 076] Blepharitis of right upper eyelid, unspecified type     6/21/2022 10:31 AM Richard Young   Add [S86 012A] Strain of left Achilles tendon, initial encounter     6/21/2022 10:32 AM Anna Wiggins  Add [M54 50,  G89 29] Chronic low back pain without sciatica, unspecified back pain laterality     6/21/2022 10:32 AM Monica Bianchi  Add [M54 2,  G89 29] Neck pain, chronic       ED Disposition     ED Disposition   Discharge    Condition   Stable    Date/Time   Tue Jun 21, 2022 10:31 AM    Comment   Prema Harrison discharge to home/self care                 Follow-up Information     Follow up With Specialties Details Why Contact Info Additional Information    Piedad Beck MD Family Medicine   Michael Wong [address] Specialist [address] [place] [address] Orthopedic Surgery   [address]  [address]  [address] [place] [address] Specialist [address] [place] [address], [address]   [phone]          Discharge Medication List as of 6/21/2022 10:35 AM      START taking these medications    Details   cephalexin (KEFLEX) 500 mg capsule Take 1 capsule (500 mg total) by mouth every 8 (eight) hours for 10 days, Starting Tue 6/21/2022, Until Fri 7/1/2022, Print      lidocaine (Lidoderm) 5 % Apply 1 patch topically daily for 7 days Remove & Discard patch within 12 hours or as directed by MD, Starting Tue 6/21/2022, Until Tue 6/28/2022, Print      methocarbamol (ROBAXIN) 500 mg tablet Take 1 tablet (500 mg total) by mouth 2 (two) times a day, Starting Tue 6/21/2022, Print         CONTINUE these medications which have NOT CHANGED    Details   amLODIPine (NORVASC) 10 mg tablet Take 1 tablet (10 mg total) by mouth daily for 21 days, Starting Tue 12/17/2019, Until Tue 1/7/2020, Normal      cyclobenzaprine (FLEXERIL) 10 mg tablet Take 1 tablet (10 mg total) by mouth 2 (two) times a day as needed for muscle spasms, Starting Wed 11/6/2019, Print      enalapril (VASOTEC) 10 mg tablet Take 1 tablet (10 mg total) by mouth daily for 21 days, Starting Tue 12/17/2019, Until Tue 1/7/2020, Normal      hydrochlorothiazide (HYDRODIURIL) 25 mg tablet Take 1 tablet (25 mg total) by mouth daily for 21 days, Starting Tue 12/17/2019, Until Tue 1/7/2020, Normal             No discharge procedures on file      PDMP Review     None          ED Provider  Electronically Signed by           Sandoval Suarez PA-C  06/21/22 4099

## 2022-08-16 ENCOUNTER — OFFICE VISIT (OUTPATIENT)
Dept: OBGYN CLINIC | Facility: CLINIC | Age: 62
End: 2022-08-16
Payer: COMMERCIAL

## 2022-08-16 VITALS
SYSTOLIC BLOOD PRESSURE: 146 MMHG | HEIGHT: 64 IN | WEIGHT: 166 LBS | BODY MASS INDEX: 28.34 KG/M2 | DIASTOLIC BLOOD PRESSURE: 86 MMHG

## 2022-08-16 DIAGNOSIS — M92.62 HAGLUND'S DEFORMITY OF LEFT HEEL: ICD-10-CM

## 2022-08-16 DIAGNOSIS — M76.60 INSERTIONAL ACHILLES TENDINOPATHY: Primary | ICD-10-CM

## 2022-08-16 PROCEDURE — 99203 OFFICE O/P NEW LOW 30 MIN: CPT | Performed by: PHYSICIAN ASSISTANT

## 2022-08-16 RX ORDER — MELOXICAM 15 MG/1
15 TABLET ORAL DAILY
Qty: 30 TABLET | Refills: 0 | Status: SHIPPED | OUTPATIENT
Start: 2022-08-16 | End: 2022-10-09 | Stop reason: ALTCHOICE

## 2022-08-16 NOTE — PROGRESS NOTES
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  1  Referral to physical therapy  2  Prescription for meloxicam     3  Recommend over-the-counter shoe lifts  4  Recommend evaluation by our foot and ankle specialist in 6-8 weeks for repeat evaluation  5  Patient also complains of soft tissue swelling and a lump on the right thoracic region inferior to the axilla  This appears to be a lipoma  I recommend evaluation by General surgery for definitive management  Chief Complaint     Left ankle pain    History of the Present Illness     Ottoniel Vaca is a 58 y o  female reports to the office today for evaluation of her left ankle  She notes a chronic history of left ankle pain which has been ongoing for a number of years  She denies any injury or trauma to the ankle in the past   Pain is localized to the posterior heel  Pain is worse with direct pressure and wearing tight-fitting shoes  She also notes increased pain with increased activities  She notes swelling in this region as well  No weakness or instability  No numbness or tingling  No fevers or chills  She currently takes nothing for pain    Physical Exam     /86   Ht 5' 4" (1 626 m)   Wt 75 3 kg (166 lb)   BMI 28 49 kg/m²     Left ankle:  Skin intact  Soft tissue swelling appreciated at the insertion of the Achilles  No erythema ecchymosis  There is tenderness to palpation about the Achilles insertion site  No tenderness to palpation calcaneus  No tenderness to palpation distal fibula and medial malleolus  No tenderness to palpation deltoid ligaments and ATFL  No palpable deformity about the Achilles tendon  Negative Mandel test   Ankle range of motion is intact and full with discomfort noted at terminal plantar flexion and dorsiflexion  Negative calcaneal squeeze test   Toes warm and mobile  Eyes: Anicteric sclerae    ENT: Trachea midline  Lungs: Normal respiratory effort  CV: Capillary refill is less than 2 seconds  Skin: Intact without erythema  Lymph: No palpable lymphadenopathy  Neuro: Sensation is grossly intact to light touch  Psych: Mood and affect are appropriate  Data Review     I have personally reviewed pertinent films in PACS, and my interpretation follows:    X-rays left ankle 6/21/22: No acute osseous abnormalities  No fracture or dislocation  No significant degenerative changes  Evidence of Danitza's deformity  Past Medical History:   Diagnosis Date    Anxiety     Chronic pain     back pain     Depression     Diabetes mellitus ([address] )     Disease of thyroid gland     hypothyroid     Hyperlipidemia     Hypertension     Psychiatric disorder        Past Surgical History:   Procedure Laterality Date    DENTAL SURGERY      HYSTERECTOMY         Allergies   Allergen Reactions    Aspirin      Patient does not know       Current Outpatient Medications on File Prior to Visit   Medication Sig Dispense Refill    amLODIPine (NORVASC) 10 mg tablet Take 1 tablet (10 mg total) by mouth daily for 21 days 21 tablet 0    cyclobenzaprine (FLEXERIL) 10 mg tablet Take 1 tablet (10 mg total) by mouth 2 (two) times a day as needed for muscle spasms 20 tablet 0    enalapril (VASOTEC) 10 mg tablet Take 1 tablet (10 mg total) by mouth daily for 21 days 21 tablet 0    hydrochlorothiazide (HYDRODIURIL) 25 mg tablet Take 1 tablet (25 mg total) by mouth daily for 21 days 21 tablet 0    lidocaine (Lidoderm) 5 % Apply 1 patch topically daily for 7 days Remove & Discard patch within 12 hours or as directed by MD Felder patch 0    methocarbamol (ROBAXIN) 500 mg tablet Take 1 tablet (500 mg total) by mouth 2 (two) times a day 14 tablet 0     No current facility-administered medications on file prior to visit         Social History     Tobacco Use    Smoking status: Current Every Day Smoker     Packs/day: 1 00     Types: Cigarettes    Smokeless tobacco: Never Used   Substance Use Topics    Alcohol use: Yes     Comment: socially     Drug use: No       History reviewed  No pertinent family history  Review of Systems     As stated in the HPI  All other systems reviewed and are negative

## 2022-10-06 ENCOUNTER — OFFICE VISIT (OUTPATIENT)
Dept: FAMILY MEDICINE CLINIC | Facility: CLINIC | Age: 62
End: 2022-10-06

## 2022-10-06 VITALS
BODY MASS INDEX: 29.16 KG/M2 | WEIGHT: 170.8 LBS | TEMPERATURE: 96.8 F | RESPIRATION RATE: 18 BRPM | HEART RATE: 64 BPM | HEIGHT: 64 IN | DIASTOLIC BLOOD PRESSURE: 104 MMHG | OXYGEN SATURATION: 99 % | SYSTOLIC BLOOD PRESSURE: 154 MMHG

## 2022-10-06 DIAGNOSIS — E78.2 MIXED HYPERLIPIDEMIA: ICD-10-CM

## 2022-10-06 DIAGNOSIS — Z12.11 COLON CANCER SCREENING: ICD-10-CM

## 2022-10-06 DIAGNOSIS — Z76.89 ENCOUNTER TO ESTABLISH CARE: Primary | ICD-10-CM

## 2022-10-06 DIAGNOSIS — Z11.59 NEED FOR HEPATITIS C SCREENING TEST: ICD-10-CM

## 2022-10-06 DIAGNOSIS — Z86.39 HISTORY OF THYROID DISORDER: ICD-10-CM

## 2022-10-06 DIAGNOSIS — F17.200 TOBACCO DEPENDENCY: ICD-10-CM

## 2022-10-06 DIAGNOSIS — R22.2 MASS ON BACK: ICD-10-CM

## 2022-10-06 DIAGNOSIS — Z13.1 SCREENING FOR DIABETES MELLITUS: ICD-10-CM

## 2022-10-06 DIAGNOSIS — M54.50 LUMBAR BACK PAIN: ICD-10-CM

## 2022-10-06 DIAGNOSIS — M76.60 INSERTIONAL ACHILLES TENDINOPATHY: ICD-10-CM

## 2022-10-06 DIAGNOSIS — I10 ESSENTIAL HYPERTENSION: ICD-10-CM

## 2022-10-06 DIAGNOSIS — M54.2 NECK PAIN: ICD-10-CM

## 2022-10-06 DIAGNOSIS — E66.3 OVERWEIGHT: ICD-10-CM

## 2022-10-06 DIAGNOSIS — R41.3 MEMORY DEFICIT: ICD-10-CM

## 2022-10-06 DIAGNOSIS — Z12.31 ENCOUNTER FOR SCREENING MAMMOGRAM FOR MALIGNANT NEOPLASM OF BREAST: ICD-10-CM

## 2022-10-06 DIAGNOSIS — Z11.4 SCREENING FOR HIV (HUMAN IMMUNODEFICIENCY VIRUS): ICD-10-CM

## 2022-10-06 PROBLEM — G89.29 CHRONIC LOW BACK PAIN: Status: ACTIVE | Noted: 2021-06-24

## 2022-10-06 PROCEDURE — 99204 OFFICE O/P NEW MOD 45 MIN: CPT

## 2022-10-06 RX ORDER — AMLODIPINE BESYLATE 10 MG/1
10 TABLET ORAL DAILY
Qty: 30 TABLET | Refills: 2 | Status: SHIPPED | OUTPATIENT
Start: 2022-10-06

## 2022-10-06 RX ORDER — NAPROXEN 500 MG/1
500 TABLET ORAL 2 TIMES DAILY PRN
Qty: 60 TABLET | Refills: 2 | Status: SHIPPED | OUTPATIENT
Start: 2022-10-06

## 2022-10-06 NOTE — PROGRESS NOTES
[facility]    NAME: Micah Raines  AGE: 58 y o  SEX: female  : 1960     DATE: 10/6/2022     Assessment and Plan:     Problem List Items Addressed This Visit        Cardiovascular and Mediastinum    Essential hypertension (Chronic)     BP above goal in office today: 154/104  Asymptomatic  Previously controlled with multiple medications  - Restart amlodipine 10 mg daily    - Recommend low-salt diet and daily physical activity    - Home BP monitoring    - Schedule eye exam    - Follow up in one month  Relevant Medications    amLODIPine (NORVASC) 10 mg tablet    Other Relevant Orders    CBC and differential    Comprehensive metabolic panel    Microalbumin / creatinine urine ratio       Musculoskeletal and Integument    Insertional Achilles tendinopathy     Has current referral for PT   - Provided contact information for local PT offices  Other    Mixed hyperlipidemia     Not currently on medication   - Lipid panel  Relevant Orders    Lipid panel    Lumbar back pain     - Naproxen 500 mg BID PRN         Relevant Medications    naproxen (Naprosyn) 500 mg tablet    History of thyroid disorder    Relevant Orders    TSH, 3rd generation with Free T4 reflex    Mass on back     Appearance of lipoma but causing pain  - US soft tissue         Relevant Orders    US superficial lump (non extremity)    Neck pain     - Naproxen 500 mg BID PRN         Relevant Medications    naproxen (Naprosyn) 500 mg tablet    Tobacco dependency     Tobacco Cessation Counseling: Tobacco cessation counseling was provided  The patient is sincerely urged to quit consumption of tobacco  She is ready to quit tobacco  Medication options and side effects of medication discussed  Nicotine gum was prescribed  Pt currently smoking about 10 cigarettes per day            Relevant Medications    nicotine polacrilex (NICORETTE) 2 mg gum    Overweight BMI Counseling: Body mass index is 29 32 kg/m²  The BMI is above normal  Nutrition recommendations include decreasing portion sizes, encouraging healthy choices of fruits and vegetables, limiting drinks that contain sugar and moderation in carbohydrate intake  Exercise recommendations include moderate physical activity 150 minutes/week  Rationale for BMI follow-up plan is due to patient being overweight or obese  Memory deficit     Worsening memory  Pt's mother with dementia  - Blood work  - MMSE/MoCA next visit  Other Visit Diagnoses     Encounter to establish care    -  Primary    Relevant Orders    CBC and differential    Comprehensive metabolic panel    Colon cancer screening        Relevant Orders    Ambulatory referral for colonoscopy    Encounter for screening mammogram for malignant neoplasm of breast        Relevant Orders    Mammo screening bilateral w 3d & cad    Need for hepatitis C screening test        Relevant Orders    Hepatitis C Antibody ([facility])    Screening for HIV (human immunodeficiency virus)        Relevant Orders    HIV 1/2 Antigen/Antibody (4th Generation) w Reflex [facility]    Screening for diabetes mellitus        Relevant Orders    Hemoglobin A1C            BMI Counseling: Body mass index is 29 32 kg/m²  The BMI is above normal  Nutrition recommendations include decreasing portion sizes, encouraging healthy choices of fruits and vegetables, limiting drinks that contain sugar and moderation in carbohydrate intake  Exercise recommendations include moderate physical activity 150 minutes/week  Rationale for BMI follow-up plan is due to patient being overweight or obese  Tobacco Cessation Counseling: Tobacco cessation counseling was provided  The patient is sincerely urged to quit consumption of tobacco  She is ready to quit tobacco  Medication options and side effects of medication discussed  Nicotine gum was prescribed   Pt currently smoking about 10 cigarettes per day  Return in about 4 weeks (around 11/3/2022) for Recheck labs, HTN  Chief Complaint:     Chief Complaint   Patient presents with   • New Patient Visit      History of Present Illness:     Orquidea Murry presented to the office accompanied by her  to establish care  She has not had consistent primary care, screenings, or medications in a few years and has been out of her medication for blood pressure for an extended period of time  Pt concerns include chronic back pain, a painful lump on her back, and her blood pressure  Her  is concerned about her memory, which he states has been worsening for the past 7 to 8 years  Family history is significant for dementia in patient's mother  Pt is a retired bus aid  Pt was recently seen by Orthopedic Surgery for insertional achilles tendinopathy and referred to PT  Has not yet scheduled  Pt declines routine vaccinations at this time  Review of Systems:     Review of Systems   Constitutional: Negative for chills, fatigue, fever and unexpected weight change  HENT: Negative for congestion  Eyes: Positive for visual disturbance (decreasing vision over time)  Respiratory: Negative for cough, chest tightness, shortness of breath and wheezing  Cardiovascular: Negative for chest pain, palpitations and leg swelling  Gastrointestinal: Negative for abdominal pain, blood in stool, constipation, diarrhea, nausea and vomiting  Genitourinary: Negative for difficulty urinating  Musculoskeletal: Positive for back pain and neck pain  Skin: Negative for rash  Allergic/Immunologic: Negative for environmental allergies  Neurological: Negative for dizziness and headaches  Psychiatric/Behavioral: Positive for sleep disturbance  Negative for dysphoric mood  The patient is nervous/anxious  All other systems reviewed and are negative       Past Medical History:     Past Medical History:   Diagnosis Date   • Anxiety    • Chronic pain back pain    • Depression    • Diabetes mellitus ([facility] [address] )    • Disease of thyroid gland     hypothyroid    • Hyperlipidemia    • Hypertension    • Psychiatric disorder       Past Surgical History:     Past Surgical History:   Procedure Laterality Date   • DENTAL SURGERY     • HYSTERECTOMY      over 20 years ago      Social History:     Social History     Socioeconomic History   • Marital status: /Civil Union     Spouse name: None   • Number of children: None   • Years of education: None   • Highest education level: None   Occupational History   • None   Tobacco Use   • Smoking status: Current Every Day Smoker     Packs/day: 0 50     Years: 40 00     Pack years: 20 00     Types: Cigarettes   • Smokeless tobacco: Never Used   Vaping Use   • Vaping Use: Never used   Substance and Sexual Activity   • Alcohol use: Yes     Comment: socially    • Drug use: Never   • Sexual activity: Yes   Other Topics Concern   • None   Social History Narrative    Flu shot:no     Social Determinants of Health     Financial Resource Strain: Low Risk    • Difficulty of Paying Living Expenses: Not hard at all   Food Insecurity: No Food Insecurity   • Worried About Running Out of Food in the Last Year: Never true   • Ran Out of Food in the Last Year: Never true   Transportation Needs: No Transportation Needs   • Lack of Transportation (Medical): No   • Lack of Transportation (Non-Medical):  No   Physical Activity: Not on file   Stress: Not on file   Social Connections: Not on file   Intimate Partner Violence: Not on file   Housing Stability: Not on file      Family History:     Family History   Problem Relation Age of Onset   • Dementia Mother    • Cancer Maternal Aunt    • Cancer Maternal Uncle       Current Medications:     Current Outpatient Medications   Medication Sig Dispense Refill   • amLODIPine (NORVASC) 10 mg tablet Take 1 tablet (10 mg total) by mouth daily 30 tablet 2   • naproxen (Naprosyn) 500 mg tablet Take 1 tablet (500 mg total) by mouth 2 (two) times a day as needed for mild pain 60 tablet 2   • nicotine polacrilex (NICORETTE) 2 mg gum Chew 1 each (2 mg total) as needed for smoking cessation 100 each 0   • lidocaine (Lidoderm) 5 % Apply 1 patch topically daily for 7 days Remove & Discard patch within 12 hours or as directed by MD 7 patch 0     No current facility-administered medications for this visit  Allergies: Allergies   Allergen Reactions   • Aspirin      Patient does not know   • Duloxetine Other (See Comments)      Physical Exam:     BP (!) 154/104 (BP Location: Right arm, Patient Position: Sitting, Cuff Size: Standard)   Pulse 64   Temp (!) 96 8 °F (36 °C) (Temporal)   Resp 18   Ht 5' 4" (1 626 m)   Wt 77 5 kg (170 lb 12 8 oz)   SpO2 99%   BMI 29 32 kg/m²     Physical Exam  Vitals reviewed  Constitutional:       General: She is not in acute distress  Appearance: She is overweight  She is not ill-appearing or diaphoretic  HENT:      Head: Normocephalic and atraumatic  Right Ear: Tympanic membrane, ear canal and external ear normal       Left Ear: Tympanic membrane, ear canal and external ear normal       Nose: Nose normal       Mouth/Throat:      Mouth: Mucous membranes are moist       Pharynx: Oropharynx is clear  Eyes:      General: Lids are normal       Conjunctiva/sclera: Conjunctivae normal       Pupils: Pupils are equal, round, and reactive to light  Neck:      Thyroid: No thyromegaly or thyroid tenderness  Cardiovascular:      Rate and Rhythm: Normal rate and regular rhythm  Pulses: Normal pulses  Heart sounds: Normal heart sounds  No murmur heard  Pulmonary:      Effort: Pulmonary effort is normal  No tachypnea  Breath sounds: Normal breath sounds  No decreased breath sounds or wheezing  Abdominal:      General: Bowel sounds are normal  There is no distension  Palpations: Abdomen is soft  Tenderness: There is generalized abdominal tenderness   There is no guarding or rebound  Musculoskeletal:      Cervical back: No crepitus  Spinous process tenderness present  Normal range of motion  Thoracic back: Deformity (soft, lipoma-like mass right thoracic back that is tender to palpation) present  Lumbar back: Tenderness present  No deformity or bony tenderness  Right lower leg: No edema  Left lower leg: No edema  Lymphadenopathy:      Cervical: No cervical adenopathy  Skin:     General: Skin is warm and dry  Neurological:      Mental Status: She is alert  Motor: Motor function is intact  Gait: Gait is intact  Psychiatric:         Attention and Perception: Attention normal          Mood and Affect: Mood and affect normal          Speech: Speech normal          Behavior: Behavior normal          Cognition and Memory: Memory is impaired  She exhibits impaired recent memory and impaired remote memory            Sh[address]

## 2022-10-09 PROBLEM — E66.3 OVERWEIGHT: Status: ACTIVE | Noted: 2022-10-09

## 2022-10-09 PROBLEM — R41.3 MEMORY DEFICIT: Status: ACTIVE | Noted: 2022-10-09

## 2022-10-09 PROBLEM — Z86.39 HISTORY OF THYROID DISORDER: Status: ACTIVE | Noted: 2022-10-09

## 2022-10-09 PROBLEM — R22.2 MASS ON BACK: Status: ACTIVE | Noted: 2022-10-09

## 2022-10-09 PROBLEM — M54.2 NECK PAIN: Status: ACTIVE | Noted: 2022-10-09

## 2022-10-09 PROBLEM — F17.200 TOBACCO DEPENDENCY: Status: ACTIVE | Noted: 2022-10-09

## 2022-10-09 PROBLEM — M76.60 INSERTIONAL ACHILLES TENDINOPATHY: Status: ACTIVE | Noted: 2022-10-09

## 2022-10-09 NOTE — ASSESSMENT & PLAN NOTE
BMI Counseling: Body mass index is 29 32 kg/m²  The BMI is above normal  Nutrition recommendations include decreasing portion sizes, encouraging healthy choices of fruits and vegetables, limiting drinks that contain sugar and moderation in carbohydrate intake  Exercise recommendations include moderate physical activity 150 minutes/week  Rationale for BMI follow-up plan is due to patient being overweight or obese

## 2022-10-09 NOTE — ASSESSMENT & PLAN NOTE
BP above goal in office today: 154/104  Asymptomatic  Previously controlled with multiple medications  - Restart amlodipine 10 mg daily    - Recommend low-salt diet and daily physical activity    - Home BP monitoring    - Schedule eye exam    - Follow up in one month

## 2022-10-09 NOTE — ASSESSMENT & PLAN NOTE
Tobacco Cessation Counseling: Tobacco cessation counseling was provided  The patient is sincerely urged to quit consumption of tobacco  She is ready to quit tobacco  Medication options and side effects of medication discussed  Nicotine gum was prescribed  Pt currently smoking about 10 cigarettes per day

## 2022-11-03 ENCOUNTER — OFFICE VISIT (OUTPATIENT)
Dept: FAMILY MEDICINE CLINIC | Facility: CLINIC | Age: 62
End: 2022-11-03

## 2022-11-03 VITALS
SYSTOLIC BLOOD PRESSURE: 164 MMHG | OXYGEN SATURATION: 97 % | RESPIRATION RATE: 18 BRPM | BODY MASS INDEX: 29.57 KG/M2 | HEIGHT: 64 IN | DIASTOLIC BLOOD PRESSURE: 98 MMHG | WEIGHT: 173.2 LBS | TEMPERATURE: 97 F | HEART RATE: 64 BPM

## 2022-11-03 DIAGNOSIS — R41.3 MEMORY DEFICIT: ICD-10-CM

## 2022-11-03 DIAGNOSIS — I10 ESSENTIAL HYPERTENSION: Primary | Chronic | ICD-10-CM

## 2022-11-03 NOTE — PROGRESS NOTES
Name: Juan F Sandoval      : 1960      MRN: [id number]  Encounter Provider: ANALI lAonso  Encounter Date: 11/3/2022  Encounter department: [address] [place] [address]     1  Essential hypertension  Assessment & Plan:  BP remains above goal in office today at 164/98 because patient did not start taking the amlodipine ordered at last visit  Pt states she does not want to take pills but did pick them up from the pharmacy  Has not completed her labs  - Provided education about the risks of uncontrolled high blood pressure and strongly advised starting the medication    - Recommend low-salt diet and daily physical activity  - Smoking cessation  2  Memory deficit  Assessment & Plan:  MMSE done today revealing moderate cognitive dysfunction  Pt experiencing illusions, seeing faces in trees, etc    - Complete the blood work ordered at last visit  Subjective     ISABEL Realobardo Shirley presented to the office accompanied by her  for HTN and memory follow up  Pt has not started taking the prescribed blood pressure medication and has not completed the blood work  Memory deficits continue unchanged   states pt was seeing a psychiatrist in the past but not currently, could offer little information about previous diagnoses/medications  Review of Systems   Constitutional: Negative for fatigue, fever and unexpected weight change  HENT: Positive for tinnitus  Eyes: Negative for visual disturbance  Respiratory: Negative for cough, shortness of breath and wheezing  Cardiovascular: Negative for chest pain and palpitations  Gastrointestinal: Negative for abdominal pain, constipation, diarrhea, nausea and vomiting  Genitourinary: Negative for difficulty urinating  Neurological: Negative for dizziness and headaches  All other systems reviewed and are negative        Past Medical History:   Diagnosis Date   • Anxiety    • Chronic pain back pain    • Depression    • Diabetes mellitus ([facility] [address] )    • Disease of thyroid gland     hypothyroid    • Hyperlipidemia    • Hypertension    • Psychiatric disorder      Past Surgical History:   Procedure Laterality Date   • DENTAL SURGERY     • HYSTERECTOMY      over 20 years ago     Family History   Problem Relation Age of Onset   • Dementia Mother    • Cancer Maternal Aunt    • Cancer Maternal Uncle      Social History     Socioeconomic History   • Marital status: /Civil Union     Spouse name: None   • Number of children: None   • Years of education: None   • Highest education level: None   Occupational History   • None   Tobacco Use   • Smoking status: Current Every Day Smoker     Packs/day: 0 50     Years: 40 00     Pack years: 20 00     Types: Cigarettes   • Smokeless tobacco: Never Used   Vaping Use   • Vaping Use: Never used   Substance and Sexual Activity   • Alcohol use: Yes     Comment: socially    • Drug use: Never   • Sexual activity: Yes   Other Topics Concern   • None   Social History Narrative    Flu shot:no     Social Determinants of Health     Financial Resource Strain: Low Risk    • Difficulty of Paying Living Expenses: Not hard at all   Food Insecurity: No Food Insecurity   • Worried About Running Out of Food in the Last Year: Never true   • Ran Out of Food in the Last Year: Never true   Transportation Needs: No Transportation Needs   • Lack of Transportation (Medical): No   • Lack of Transportation (Non-Medical):  No   Physical Activity: Not on file   Stress: Not on file   Social Connections: Not on file   Intimate Partner Violence: Not on file   Housing Stability: Not on file     Current Outpatient Medications on File Prior to Visit   Medication Sig   • amLODIPine (NORVASC) 10 mg tablet Take 1 tablet (10 mg total) by mouth daily   • lidocaine (Lidoderm) 5 % Apply 1 patch topically daily for 7 days Remove & Discard patch within 12 hours or as directed by MD   • naproxen (Naprosyn) 500 mg tablet Take 1 tablet (500 mg total) by mouth 2 (two) times a day as needed for mild pain   • nicotine polacrilex (NICORETTE) 2 mg gum Chew 1 each (2 mg total) as needed for smoking cessation     Allergies   Allergen Reactions   • Aspirin      Patient does not know   • Duloxetine Other (See Comments)       There is no immunization history on file for this patient  Objective     /98 (BP Location: Left arm, Patient Position: Sitting, Cuff Size: Standard)   Pulse 64   Temp (!) 97 °F (36 1 °C) (Temporal)   Resp 18   Ht 5' 4" (1 626 m)   Wt 78 6 kg (173 lb 3 2 oz)   SpO2 97%   BMI 29 73 kg/m²     Physical Exam  Vitals reviewed  Constitutional:       General: She is not in acute distress  Appearance: She is overweight  She is not ill-appearing or diaphoretic  HENT:      Head: Normocephalic and atraumatic  Cardiovascular:      Rate and Rhythm: Normal rate and regular rhythm  Heart sounds: Normal heart sounds  No murmur heard  Pulmonary:      Effort: Pulmonary effort is normal  No tachypnea  Breath sounds: Normal breath sounds  No decreased breath sounds or wheezing  Skin:     General: Skin is warm  Neurological:      Mental Status: She is alert and oriented to person, place, and time  Motor: Motor function is intact  Gait: Gait is intact  Psychiatric:         Mood and Affect: Mood and affect normal          Speech: Speech normal          Behavior: Behavior normal          Thought Content: Thought content does not include homicidal or suicidal ideation  Cognition and Memory: Memory is impaired         ANALI Benoit

## 2022-11-10 DIAGNOSIS — E78.2 MIXED HYPERLIPIDEMIA: Primary | ICD-10-CM

## 2022-11-10 DIAGNOSIS — R41.3 MEMORY DEFICIT: ICD-10-CM

## 2022-11-10 LAB
ALBUMIN SERPL-MCNC: 4.7 G/DL (ref 3.6–5.1)
ALBUMIN/GLOB SERPL: 1.5 (CALC) (ref 1–2.5)
ALP SERPL-CCNC: 68 U/L (ref 37–153)
ALT SERPL-CCNC: 11 U/L (ref 6–29)
AST SERPL-CCNC: 16 U/L (ref 10–35)
BASOPHILS # BLD AUTO: 43 CELLS/UL (ref 0–200)
BASOPHILS NFR BLD AUTO: 0.6 %
BILIRUB SERPL-MCNC: 0.4 MG/DL (ref 0.2–1.2)
BUN SERPL-MCNC: 10 MG/DL (ref 7–25)
BUN/CREAT SERPL: NORMAL (CALC) (ref 6–22)
CALCIUM SERPL-MCNC: 10.2 MG/DL (ref 8.6–10.4)
CHLORIDE SERPL-SCNC: 107 MMOL/L (ref 98–110)
CHOLEST SERPL-MCNC: 220 MG/DL
CHOLEST/HDLC SERPL: 3.9 (CALC)
CO2 SERPL-SCNC: 28 MMOL/L (ref 20–32)
CREAT SERPL-MCNC: 0.83 MG/DL (ref 0.5–1.05)
EOSINOPHIL # BLD AUTO: 28 CELLS/UL (ref 15–500)
EOSINOPHIL NFR BLD AUTO: 0.4 %
ERYTHROCYTE [DISTWIDTH] IN BLOOD BY AUTOMATED COUNT: 15.5 % (ref 11–15)
GFR/BSA.PRED SERPLBLD CYS-BASED-ARV: 80 ML/MIN/1.73M2
GLOBULIN SER CALC-MCNC: 3.2 G/DL (CALC) (ref 1.9–3.7)
GLUCOSE SERPL-MCNC: 91 MG/DL (ref 65–99)
HBA1C MFR BLD: 5.6 % OF TOTAL HGB
HCT VFR BLD AUTO: 41 % (ref 35–45)
HCV AB S/CO SERPL IA: 0.08
HCV AB SERPL QL IA: NORMAL
HDLC SERPL-MCNC: 56 MG/DL
HGB BLD-MCNC: 13.7 G/DL (ref 11.7–15.5)
HIV 1+2 AB+HIV1 P24 AG SERPL QL IA: NORMAL
LDLC SERPL CALC-MCNC: 140 MG/DL (CALC)
LYMPHOCYTES # BLD AUTO: 2421 CELLS/UL (ref 850–3900)
LYMPHOCYTES NFR BLD AUTO: 34.1 %
MCH RBC QN AUTO: 30 PG (ref 27–33)
MCHC RBC AUTO-ENTMCNC: 33.4 G/DL (ref 32–36)
MCV RBC AUTO: 89.9 FL (ref 80–100)
MONOCYTES # BLD AUTO: 412 CELLS/UL (ref 200–950)
MONOCYTES NFR BLD AUTO: 5.8 %
NEUTROPHILS # BLD AUTO: 4196 CELLS/UL (ref 1500–7800)
NEUTROPHILS NFR BLD AUTO: 59.1 %
NONHDLC SERPL-MCNC: 164 MG/DL (CALC)
PLATELET # BLD AUTO: 323 THOUSAND/UL (ref 140–400)
PMV BLD REES-ECKER: 10.9 FL (ref 7.5–12.5)
POTASSIUM SERPL-SCNC: 4.3 MMOL/L (ref 3.5–5.3)
PROT SERPL-MCNC: 7.9 G/DL (ref 6.1–8.1)
RBC # BLD AUTO: 4.56 MILLION/UL (ref 3.8–5.1)
SODIUM SERPL-SCNC: 143 MMOL/L (ref 135–146)
TRIGL SERPL-MCNC: 119 MG/DL
TSH SERPL-ACNC: 2.76 MIU/L (ref 0.4–4.5)
WBC # BLD AUTO: 7.1 THOUSAND/UL (ref 3.8–10.8)

## 2022-11-10 RX ORDER — ROSUVASTATIN CALCIUM 10 MG/1
10 TABLET, COATED ORAL DAILY
Qty: 30 TABLET | Refills: 2 | Status: SHIPPED | OUTPATIENT
Start: 2022-11-10

## 2022-11-10 NOTE — ASSESSMENT & PLAN NOTE
BP remains above goal in office today at 164/98 because patient did not start taking the amlodipine ordered at last visit  Pt states she does not want to take pills but did pick them up from the pharmacy  Has not completed her labs  - Provided education about the risks of uncontrolled high blood pressure and strongly advised starting the medication    - Recommend low-salt diet and daily physical activity  - Smoking cessation

## 2022-11-10 NOTE — ASSESSMENT & PLAN NOTE
MMSE done today revealing moderate cognitive dysfunction  Pt experiencing illusions, seeing faces in trees, etc    - Complete the blood work ordered at last visit

## 2022-12-22 ENCOUNTER — VBI (OUTPATIENT)
Dept: ADMINISTRATIVE | Facility: OTHER | Age: 62
End: 2022-12-22

## 2022-12-28 ENCOUNTER — VBI (OUTPATIENT)
Dept: ADMINISTRATIVE | Facility: OTHER | Age: 62
End: 2022-12-28

## 2023-02-11 ENCOUNTER — HOSPITAL ENCOUNTER (EMERGENCY)
Facility: HOSPITAL | Age: 63
Discharge: HOME | End: 2023-02-11
Attending: EMERGENCY MEDICINE

## 2023-02-11 VITALS
OXYGEN SATURATION: 98 % | RESPIRATION RATE: 18 BRPM | WEIGHT: 167.55 LBS | SYSTOLIC BLOOD PRESSURE: 168 MMHG | BODY MASS INDEX: 28.76 KG/M2 | TEMPERATURE: 97.1 F | DIASTOLIC BLOOD PRESSURE: 100 MMHG | HEART RATE: 72 BPM

## 2023-02-11 DIAGNOSIS — R22.0 LEFT FACIAL SWELLING: Primary | ICD-10-CM

## 2023-02-11 DIAGNOSIS — K02.9 DENTAL CARIES: ICD-10-CM

## 2023-02-11 RX ORDER — NAPROXEN 500 MG/1
500 TABLET ORAL 2 TIMES DAILY WITH MEALS
Qty: 30 TABLET | Refills: 0 | Status: SHIPPED | OUTPATIENT
Start: 2023-02-11

## 2023-02-11 RX ORDER — DIPHENHYDRAMINE HCL 25 MG
25 TABLET ORAL EVERY 6 HOURS PRN
Qty: 30 TABLET | Refills: 0 | Status: SHIPPED | OUTPATIENT
Start: 2023-02-11

## 2023-02-11 RX ORDER — DIPHENHYDRAMINE HCL 25 MG
25 TABLET ORAL ONCE
Status: COMPLETED | OUTPATIENT
Start: 2023-02-11 | End: 2023-02-11

## 2023-02-11 RX ORDER — AMOXICILLIN 500 MG/1
500 CAPSULE ORAL ONCE
Status: COMPLETED | OUTPATIENT
Start: 2023-02-11 | End: 2023-02-11

## 2023-02-11 RX ORDER — AMOXICILLIN 500 MG/1
500 CAPSULE ORAL 3 TIMES DAILY
Qty: 21 CAPSULE | Refills: 0 | Status: SHIPPED | OUTPATIENT
Start: 2023-02-11 | End: 2023-02-18

## 2023-02-11 RX ORDER — KETOROLAC TROMETHAMINE 30 MG/ML
15 INJECTION, SOLUTION INTRAMUSCULAR; INTRAVENOUS ONCE
Status: COMPLETED | OUTPATIENT
Start: 2023-02-11 | End: 2023-02-11

## 2023-02-11 RX ADMIN — AMOXICILLIN 500 MG: 500 CAPSULE ORAL at 11:39

## 2023-02-11 RX ADMIN — DIPHENHYDRAMINE HCL 25 MG: 25 TABLET ORAL at 11:43

## 2023-02-11 RX ADMIN — KETOROLAC TROMETHAMINE 15 MG: 30 INJECTION, SOLUTION INTRAMUSCULAR; INTRAVENOUS at 11:41

## 2023-02-11 NOTE — ED PROVIDER NOTES
History  Chief Complaint   Patient presents with   • Facial Swelling     Pt arrives c/o left side face pain for 1 week  denies fevers      57 yo female with a history of anxiety, chronic back pain, depression, DM, hypothyroidism, hyperlipidemia, and HTN presents to the ED complaining of left sided facial swelling x 1 week  The patient says the swelling started shortly after she smoked an "Al [employer]" cigar on February 6th  She apparently had a similar reaction to these cigars "years ago"  The patient also reports some tenderness and pain in the left upper gums  She has not seen a dentist in several years  No fevers or chills  No tongue swelling, drooling, voice change, or sore throat   No difficulty eating or drinking  The swelling has "come down" somewhat since onset  No other specific complaints  Prior to Admission Medications   Prescriptions Last Dose Informant Patient Reported? Taking?    amLODIPine (NORVASC) 10 mg tablet   No No   Sig: Take 1 tablet (10 mg total) by mouth daily   lidocaine (Lidoderm) 5 %   No No   Sig: Apply 1 patch topically daily for 7 days Remove & Discard patch within 12 hours or as directed by MD   naproxen (Naprosyn) 500 mg tablet   No No   Sig: Take 1 tablet (500 mg total) by mouth 2 (two) times a day as needed for mild pain   nicotine polacrilex (NICORETTE) 2 mg gum   No No   Sig: Chew 1 each (2 mg total) as needed for smoking cessation   rosuvastatin (CRESTOR) 10 MG tablet   No No   Sig: Take 1 tablet (10 mg total) by mouth daily      Facility-Administered Medications: None       Past Medical History:   Diagnosis Date   • Anxiety    • Chronic pain     back pain    • Depression    • Diabetes mellitus ([facility] [address] )    • Disease of thyroid gland     hypothyroid    • Hyperlipidemia    • Hypertension    • Psychiatric disorder        Past Surgical History:   Procedure Laterality Date   • DENTAL SURGERY     • HYSTERECTOMY      over 20 years ago       Family History   Problem Relation Age of Onset   • Dementia Mother    • Cancer Maternal Aunt    • Cancer Maternal Uncle      I have reviewed and agree with the history as documented  E-Cigarette/Vaping   • E-Cigarette Use Never User      E-Cigarette/Vaping Substances   • Nicotine No    • THC No    • CBD No    • Flavoring No    • Other No    • Unknown No      Social History     Tobacco Use   • Smoking status: Every Day     Packs/day: 0 50     Years: 40 00     Pack years: 20 00     Types: Cigarettes   • Smokeless tobacco: Never   Vaping Use   • Vaping Use: Never used   Substance Use Topics   • Alcohol use: Yes     Comment: socially    • Drug use: Never       Review of Systems   Constitutional: Negative for chills and fever  HENT: Positive for dental problem and facial swelling  Negative for drooling, ear pain, sore throat and voice change  Eyes: Negative for visual disturbance  Respiratory: Negative for shortness of breath  Cardiovascular: Negative for chest pain  Gastrointestinal: Negative for abdominal pain, diarrhea and vomiting  Endocrine: Negative for cold intolerance and heat intolerance  Genitourinary: Negative for dysuria and frequency  Musculoskeletal: Negative for back pain  Skin: Negative for rash  Allergic/Immunologic: Negative for immunocompromised state  Neurological: Negative for weakness and numbness  Hematological: Negative for adenopathy  Psychiatric/Behavioral: Negative for self-injury  Physical Exam  Physical Exam  Constitutional:       General: She is not in acute distress  Appearance: She is well-developed  HENT:      Head: Normocephalic and atraumatic  Mouth/Throat:      Mouth: Mucous membranes are moist       Dentition: Abnormal dentition  Dental tenderness, gingival swelling and dental caries present  No dental abscesses  Comments: (+) Generally poor dentition with multiple dental caries and some gum swelling in the left upper mouth  (+) Tenderness   No appreciable abscess  Eyes:      Pupils: Pupils are equal, round, and reactive to light  Cardiovascular:      Rate and Rhythm: Normal rate and regular rhythm  Pulmonary:      Effort: Pulmonary effort is normal       Breath sounds: Normal breath sounds  Abdominal:      General: There is no distension  Palpations: Abdomen is soft  Tenderness: There is no abdominal tenderness  Musculoskeletal:         General: Normal range of motion  Cervical back: Normal range of motion and neck supple  Skin:     General: Skin is warm and dry  Neurological:      General: No focal deficit present  Mental Status: She is alert and oriented to person, place, and time  Cranial Nerves: Cranial nerves 2-12 are intact  Sensory: Sensation is intact  Motor: Motor function is intact  Coordination: Coordination is intact  Gait: Gait is intact  Deep Tendon Reflexes: Reflexes are normal and symmetric           Vital Signs  ED Triage Vitals   Temperature Pulse Respirations Blood Pressure SpO2   02/11/23 1058 02/11/23 1058 02/11/23 1058 02/11/23 1058 02/11/23 1058   (!) 97 1 °F (36 2 °C) 72 18 168/100 98 %      Temp Source Heart Rate Source Patient Position - Orthostatic VS BP Location FiO2 (%)   02/11/23 1058 02/11/23 1058 02/11/23 1058 02/11/23 1058 --   Tympanic Monitor Sitting Left arm       Pain Score       02/11/23 1141       8           Vitals:    02/11/23 1058   BP: 168/100   Pulse: 72   Patient Position - Orthostatic VS: Sitting         Visual Acuity      ED Medications  Medications   ketorolac (TORADOL) injection 15 mg (15 mg Intramuscular Given 2/11/23 1141)   amoxicillin (AMOXIL) capsule 500 mg (500 mg Oral Given 2/11/23 1139)   diphenhydrAMINE (BENADRYL) tablet 25 mg (25 mg Oral Given 2/11/23 1143)       Diagnostic Studies  Results Reviewed     None                 No orders to display              Procedures  Procedures         ED Course                               SBIRT 20yo+ Flowsheet Row Most Recent Value   SBIRT (23 yo +)    In order to provide better care to our patients, we are screening all of our patients for alcohol and drug use  Would it be okay to ask you these screening questions? Yes Filed at: 02/11/2023 1147   Initial Alcohol Screen: US AUDIT-C     1  How often do you have a drink containing alcohol? 0 Filed at: 02/11/2023 1147   2  How many drinks containing alcohol do you have on a typical day you are drinking? 0 Filed at: 02/11/2023 1147   3b  FEMALE Any Age, or MALE 65+: How often do you have 4 or more drinks on one occassion? 0 Filed at: 02/11/2023 1143   Audit-C Score 0 Filed at: 02/11/2023 1148   VANIA: How many times in the past year have you    Used an illegal drug or used a prescription medication for non-medical reasons? Never Filed at: 02/11/2023 1147                    Medical Decision Making  The patient is comfortable appearing with stable vital signs  (+) Generally poor dentition and some tenderness to palpation over left upper teeth/gums  Facial swelling is likely secondary to a dental infection rather than an actual allergic reaction  No appreciable abscess  Plan for a course of oral antibiotics and antiinflammatories/Benadryl as needed  The patient was instructed to follow up with both the Dental Clinic and her PCP next week  She is agreeable to this plan  Strict return precautions provided  Dental caries: complicated acute illness or injury  Left facial swelling: complicated acute illness or injury  Risk  OTC drugs  Prescription drug management            Disposition  Final diagnoses:   Left facial swelling   Dental caries     Time reflects when diagnosis was documented in both MDM as applicable and the Disposition within this note     Time User Action Codes Description Comment    2/11/2023 11:26 AM Cheri Leiva Add [R22 0] Left facial swelling     2/11/2023 11:26 AM Isaías Gaona Ala Add [K02 9] Dental caries       ED Disposition     ED Disposition   Discharge    Condition   Stable    Date/Time   Sat Feb 11, 2023 11:25 AM    Comment   Alicia Hammonds discharge to home/self care  Follow-up Information     Follow up With Specialties Details Why [address]  Schedule an appointment as soon as possible for a visit   [address]  [address]  [address], [address]  Nurse Practitioner, Family Medicine Schedule an appointment as soon as possible for a visit   [address] [address]  [phone]            Discharge Medication List as of 2/11/2023 11:29 AM      START taking these medications    Details   amoxicillin (AMOXIL) 500 mg capsule Take 1 capsule (500 mg total) by mouth 3 (three) times a day for 7 days, Starting Sat 2/11/2023, Until Sat 2/18/2023, Normal      diphenhydrAMINE (BENADRYL) 25 mg tablet Take 1 tablet (25 mg total) by mouth every 6 (six) hours as needed for itching, Starting Sat 2/11/2023, Normal      !! naproxen (NAPROSYN) 500 mg tablet Take 1 tablet (500 mg total) by mouth 2 (two) times a day with meals, Starting Sat 2/11/2023, Normal       !! - Potential duplicate medications found  Please discuss with provider        CONTINUE these medications which have NOT CHANGED    Details   amLODIPine (NORVASC) 10 mg tablet Take 1 tablet (10 mg total) by mouth daily, Starting Thu 10/6/2022, Normal      lidocaine (Lidoderm) 5 % Apply 1 patch topically daily for 7 days Remove & Discard patch within 12 hours or as directed by MD, Starting Tue 6/21/2022, Until Tue 6/28/2022, Print      !! naproxen (Naprosyn) 500 mg tablet Take 1 tablet (500 mg total) by mouth 2 (two) times a day as needed for mild pain, Starting Thu 10/6/2022, Normal      nicotine polacrilex (NICORETTE) 2 mg gum Chew 1 each (2 mg total) as needed for smoking cessation, Starting Thu 10/6/2022, Normal      rosuvastatin (CRESTOR) 10 MG tablet Take 1 tablet (10 mg total) by mouth daily, Starting Thu 11/10/2022, Normal       !! - Potential duplicate medications found  Please discuss with provider  No discharge procedures on file      PDMP Review     None          ED Provider  Electronically Signed by           Lynn Quevedo MD  02/11/23 2484

## 2023-05-22 ENCOUNTER — TELEPHONE (OUTPATIENT)
Dept: NEUROLOGY | Facility: CLINIC | Age: 63
End: 2023-05-22

## 2023-07-23 ENCOUNTER — HOSPITAL ENCOUNTER (EMERGENCY)
Facility: HOSPITAL | Age: 63
Discharge: HOME | End: 2023-07-23
Attending: INTERNAL MEDICINE | Admitting: INTERNAL MEDICINE
Payer: COMMERCIAL

## 2023-07-23 VITALS
OXYGEN SATURATION: 96 % | SYSTOLIC BLOOD PRESSURE: 166 MMHG | RESPIRATION RATE: 20 BRPM | HEART RATE: 68 BPM | BODY MASS INDEX: 30.52 KG/M2 | TEMPERATURE: 97.7 F | WEIGHT: 177.8 LBS | DIASTOLIC BLOOD PRESSURE: 99 MMHG

## 2023-07-23 DIAGNOSIS — F41.9 ANXIETY: Primary | ICD-10-CM

## 2023-07-23 DIAGNOSIS — M54.9 CHRONIC BACK PAIN: ICD-10-CM

## 2023-07-23 DIAGNOSIS — G89.29 CHRONIC BACK PAIN: ICD-10-CM

## 2023-07-23 LAB
ATRIAL RATE: 66 BPM
GLUCOSE SERPL-MCNC: 190 MG/DL (ref 65–140)
P AXIS: 55 DEGREES
PR INTERVAL: 152 MS
QRS AXIS: 42 DEGREES
QRSD INTERVAL: 72 MS
QT INTERVAL: 434 MS
QTC INTERVAL: 454 MS
T WAVE AXIS: 163 DEGREES
VENTRICULAR RATE: 66 BPM

## 2023-07-23 PROCEDURE — 93005 ELECTROCARDIOGRAM TRACING: CPT

## 2023-07-23 PROCEDURE — 93010 ELECTROCARDIOGRAM REPORT: CPT | Performed by: INTERNAL MEDICINE

## 2023-07-23 PROCEDURE — 82948 REAGENT STRIP/BLOOD GLUCOSE: CPT

## 2023-07-23 PROCEDURE — 99284 EMERGENCY DEPT VISIT MOD MDM: CPT

## 2023-07-23 RX ORDER — SENNOSIDES 8.6 MG
650 CAPSULE ORAL EVERY 8 HOURS PRN
Qty: 30 TABLET | Refills: 0 | Status: SHIPPED | OUTPATIENT
Start: 2023-07-23

## 2023-07-23 RX ORDER — ACETAMINOPHEN 325 MG/1
650 TABLET ORAL ONCE
Status: COMPLETED | OUTPATIENT
Start: 2023-07-23 | End: 2023-07-23

## 2023-07-23 RX ORDER — HYDROXYZINE HYDROCHLORIDE 25 MG/1
25 TABLET, FILM COATED ORAL EVERY 6 HOURS
Qty: 12 TABLET | Refills: 0 | Status: SHIPPED | OUTPATIENT
Start: 2023-07-23

## 2023-07-23 RX ORDER — HYDROXYZINE HYDROCHLORIDE 25 MG/1
25 TABLET, FILM COATED ORAL ONCE
Status: COMPLETED | OUTPATIENT
Start: 2023-07-23 | End: 2023-07-23

## 2023-07-23 RX ADMIN — ACETAMINOPHEN 650 MG: 325 TABLET ORAL at 17:31

## 2023-07-23 RX ADMIN — HYDROXYZINE HYDROCHLORIDE 25 MG: 25 TABLET ORAL at 17:31

## 2023-07-23 NOTE — ED PROVIDER NOTES
History  Chief Complaint   Patient presents with   • Anxiety     Patient reports "My anxiety is so bad", anxiety for a couple of days. Doesn't take meds for anxiety, has not spoken to PCP. Complains of generalized headache, took aleve yesterday. HPI  45-year-old woman presents to ED for evaluation of anxiety. Patient reports anxiety in her entire life. Patient reports increased anxiety since she quit smoking 3 months ago. She reports she will feel anxious and hyperventilate pretty frequently. She states she has not talked any therapists, despite other people recommending them. She has not tried any medicines. She also reports chronic back pain from her arthritis. She has not tried any medications or therapies for the back pain. She denies any falls or recent traumas. She denies any worsening in her back pain. Patient also reports a headache since yesterday. States she took Aleve yesterday and that has helped she states is not that bad right now is declining any medicines for headache. Patient is agreeable to trying medicines here for anxiety and back pain and following up with her PCP. Patient denies visual changes, dizziness, fevers, chills, chest pain, palpitations, abdominal pain, diarrhea, melena, hematochezia, dysuria, new skin rashes or numbness or tingling of the extremities. Prior to Admission Medications   Prescriptions Last Dose Informant Patient Reported? Taking?    amLODIPine (NORVASC) 10 mg tablet   No No   Sig: Take 1 tablet (10 mg total) by mouth daily   rosuvastatin (CRESTOR) 10 MG tablet   No No   Sig: Take 1 tablet (10 mg total) by mouth daily      Facility-Administered Medications: None       Past Medical History:   Diagnosis Date   • Anxiety    • Chronic pain     back pain    • Depression    • Diabetes mellitus ([address])    • Disease of thyroid gland     hypothyroid    • Hyperlipidemia    • Hypertension    • Psychiatric disorder        Past Surgical History:   Procedure Laterality Date   • DENTAL SURGERY     • HYSTERECTOMY      over 20 years ago       Family History   Problem Relation Age of Onset   • Dementia Mother    • Cancer Maternal Aunt    • Cancer Maternal Uncle      I have reviewed and agree with the history as documented. E-Cigarette/Vaping   • E-Cigarette Use Never User      E-Cigarette/Vaping Substances   • Nicotine No    • THC No    • CBD No    • Flavoring No    • Other No    • Unknown No      Social History     Tobacco Use   • Smoking status: Former     Packs/day: 0.50     Years: 40.00     Total pack years: 20.00     Types: Cigarettes     Quit date: 2023     Years since quittin.2   • Smokeless tobacco: Never   Vaping Use   • Vaping Use: Never used   Substance Use Topics   • Alcohol use: Yes     Comment: socially    • Drug use: Never       Review of Systems   All other systems reviewed and are negative. Physical Exam  Physical Exam  PHYSICAL EXAM    Constitutional:  Well developed, well nourished, no acute distress, non-toxic appearance    HEENT:  Conjunctiva normal. Oropharynx moist  Respiratory:  No respiratory distress, normal breath sounds  Cardiovascular:  Normal rate, normal rhythm, no murmurs  GI:  Soft, nondistended, normal bowel sounds, nontender  :  No costovertebral angle tenderness   Musculoskeletal:  No edema, no tenderness, no deformities.   No midline cervical, thoracic or lumbar spine tenderness to palpation   Integument:  Well hydrated, no rash   Lymphatic:  No lymphadenopathy noted   Neurologic:  Alert & oriented, normal motor function, normal sensory function, no focal deficits noted   Psychiatric:  Speech and behavior appropriate     Vital Signs  ED Triage Vitals   Temperature Pulse Respirations Blood Pressure SpO2   23 1553 23 1556 23 1556 23 1556 23 1556   97.7 °F (36.5 °C) 68 20 166/99 96 %      Temp Source Heart Rate Source Patient Position - Orthostatic VS BP Location FiO2 (%)   23 1553 07/23/23 1556 07/23/23 1556 07/23/23 1556 --   Oral Monitor Lying Left arm       Pain Score       --                  Vitals:    07/23/23 1556   BP: 166/99   Pulse: 68   Patient Position - Orthostatic VS: Lying         Visual Acuity  Visual Acuity    Flowsheet Row Most Recent Value   L Pupil Size (mm) 3   R Pupil Size (mm) 3          ED Medications  Medications   acetaminophen (TYLENOL) tablet 650 mg (has no administration in time range)   hydrOXYzine HCL (ATARAX) tablet 25 mg (has no administration in time range)       Diagnostic Studies  Results Reviewed     Procedure Component Value Units Date/Time    Fingerstick Glucose (POCT) [[id number]]  (Abnormal) Collected: 07/23/23 1605    Lab Status: Final result Updated: 07/23/23 1607     POC Glucose 190 mg/dl                  No orders to display              Procedures  Procedures         ED Course  ED Course as of 07/23/23 1713   Sun Jul 23, 2023   1600 EKG: NSR, T wave abnormality    1609 POC Glucose(!): 190                               SBIRT 22yo+    Flowsheet Row Most Recent Value   Initial Alcohol Screen: US AUDIT-C     1. How often do you have a drink containing alcohol? 0 Filed at: 07/23/2023 1557   2. How many drinks containing alcohol do you have on a typical day you are drinking? 0 Filed at: 07/23/2023 1557   3a. Male UNDER 65: How often do you have five or more drinks on one occasion? 0 Filed at: 07/23/2023 1557   3b. FEMALE Any Age, or MALE 65+: How often do you have 4 or more drinks on one occassion? 0 Filed at: 07/23/2023 1557   Audit-C Score 0 Filed at: 07/23/2023 1557   VANIA: How many times in the past year have you. .. Used an illegal drug or used a prescription medication for non-medical reasons? Never Filed at: 07/23/2023 1557                    Medical Decision Making  Anxiety: acute illness or injury     Details: Longstanding anxiety, not previously treated with medications or therapy. We will start medications in the ED.   Will refer patient to PCP for reevaluation and further treatment  Chronic back pain: acute illness or injury     Details: Chronic back pain, unchanged, no recent falls or trauma. We will start treatment in the ED and have patient follow-up with PCP  Amount and/or Complexity of Data Reviewed  Labs: ordered. Decision-making details documented in ED Course. Risk  OTC drugs. Prescription drug management. Disposition  Final diagnoses:   Anxiety   Chronic back pain     Time reflects when diagnosis was documented in both MDM as applicable and the Disposition within this note     Time User Action Codes Description Comment    7/23/2023  4:18 PM Marlee Crigler [F41.9] Anxiety     7/23/2023  4:19 PM Ya Rogers [M54.9,  G89.29] Chronic back pain       ED Disposition     ED Disposition   Discharge    Condition   Stable    Date/Time   Sun Jul 23, 2023  4:19 PM    Comment   Colonel Flynn discharge to home/self care. Follow-up Information     Follow up With Specialties Details Why Contact Info    [facility], [address] [place] [address] Nurse Practitioner, Family Medicine Schedule an appointment as soon as possible for a visit in 1 week  [address]            Patient's Medications   Discharge Prescriptions    ACETAMINOPHEN (TYLENOL) 650 MG CR TABLET    Take 1 tablet (650 mg total) by mouth every 8 (eight) hours as needed for mild pain       Start Date: 7/23/2023 End Date: --       Order Dose: 650 mg       Quantity: 30 tablet    Refills: 0    HYDROXYZINE HCL (ATARAX) 25 MG TABLET    Take 1 tablet (25 mg total) by mouth every 6 (six) hours       Start Date: 7/23/2023 End Date: --       Order Dose: 25 mg       Quantity: 12 tablet    Refills: 0       No discharge procedures on file.     PDMP Review     None          ED Provider  Electronically Signed by           Ya Deal MD  07/23/23 6908

## 2023-08-30 ENCOUNTER — HOSPITAL ENCOUNTER (EMERGENCY)
Facility: HOSPITAL | Age: 63
Discharge: HOME | End: 2023-08-30
Attending: EMERGENCY MEDICINE
Payer: COMMERCIAL

## 2023-08-30 VITALS
WEIGHT: 176.5 LBS | HEART RATE: 53 BPM | RESPIRATION RATE: 20 BRPM | DIASTOLIC BLOOD PRESSURE: 101 MMHG | SYSTOLIC BLOOD PRESSURE: 163 MMHG | BODY MASS INDEX: 30.3 KG/M2 | TEMPERATURE: 98 F | OXYGEN SATURATION: 97 %

## 2023-08-30 DIAGNOSIS — M54.9 BACK PAIN: ICD-10-CM

## 2023-08-30 DIAGNOSIS — D17.9 LIPOMA: Primary | ICD-10-CM

## 2023-08-30 DIAGNOSIS — F41.9 ANXIETY: ICD-10-CM

## 2023-08-30 PROCEDURE — 99283 EMERGENCY DEPT VISIT LOW MDM: CPT

## 2023-08-30 PROCEDURE — 99284 EMERGENCY DEPT VISIT MOD MDM: CPT | Performed by: PHYSICIAN ASSISTANT

## 2023-08-30 RX ORDER — CYCLOBENZAPRINE HCL 10 MG
10 TABLET ORAL 2 TIMES DAILY PRN
Qty: 20 TABLET | Refills: 0 | Status: SHIPPED | OUTPATIENT
Start: 2023-08-30

## 2023-08-30 RX ORDER — LIDOCAINE 50 MG/G
1 PATCH TOPICAL DAILY
Qty: 30 PATCH | Refills: 0 | Status: SHIPPED | OUTPATIENT
Start: 2023-08-30

## 2023-08-30 RX ORDER — LIDOCAINE 50 MG/G
1 PATCH TOPICAL ONCE
Status: DISCONTINUED | OUTPATIENT
Start: 2023-08-30 | End: 2023-08-30 | Stop reason: HOSPADM

## 2023-08-30 RX ORDER — CYCLOBENZAPRINE HCL 10 MG
10 TABLET ORAL ONCE
Status: COMPLETED | OUTPATIENT
Start: 2023-08-30 | End: 2023-08-30

## 2023-08-30 RX ADMIN — LIDOCAINE 1 PATCH: 700 PATCH TOPICAL at 08:22

## 2023-08-30 RX ADMIN — CYCLOBENZAPRINE HYDROCHLORIDE 10 MG: 10 TABLET, FILM COATED ORAL at 08:21

## 2023-08-30 NOTE — ED PROVIDER NOTES
History  Chief Complaint   Patient presents with   • Anxiety     Worsening anxiety for a few weeks   • Back Pain     Rt sided lower back pain for 2 weeks as well     Patient is a 24-year-old female presenting to the emergency department for evaluation of 2 complaints first complaint is anxiety which she reports she had her whole life and reports it increases as she is quit smoking patient reports she has not made an appointment with any therapist despite recommendations of the same. Patient denies any suicidal ideations, homicidal ideations. Patient also reports she has chronic back pain and reports she was told she had a "fat tumor". Patient denies any bowel or bladder incontinence, saddle anesthesia, numbness, tingling, weakness, paresthesias, paralysis or direct traumatic inciting events. Prior to Admission Medications   Prescriptions Last Dose Informant Patient Reported?  Taking?   acetaminophen (TYLENOL) 650 mg CR tablet   No No   Sig: Take 1 tablet (650 mg total) by mouth every 8 (eight) hours as needed for mild pain   amLODIPine (NORVASC) 10 mg tablet   No No   Sig: Take 1 tablet (10 mg total) by mouth daily   hydrOXYzine HCL (ATARAX) 25 mg tablet   No No   Sig: Take 1 tablet (25 mg total) by mouth every 6 (six) hours   rosuvastatin (CRESTOR) 10 MG tablet   No No   Sig: Take 1 tablet (10 mg total) by mouth daily      Facility-Administered Medications: None       Past Medical History:   Diagnosis Date   • Anxiety    • Chronic pain     back pain    • Depression    • Diabetes mellitus (HCC)    • Disease of thyroid gland     hypothyroid    • Hyperlipidemia    • Hypertension    • Psychiatric disorder        Past Surgical History:   Procedure Laterality Date   • DENTAL SURGERY     • HYSTERECTOMY      over 20 years ago       Family History   Problem Relation Age of Onset   • Dementia Mother    • Cancer Maternal Aunt    • Cancer Maternal Uncle      I have reviewed and agree with the history as documented. E-Cigarette/Vaping   • E-Cigarette Use Never User      E-Cigarette/Vaping Substances   • Nicotine No    • THC No    • CBD No    • Flavoring No    • Other No    • Unknown No      Social History     Tobacco Use   • Smoking status: Former     Packs/day: 0.50     Years: 40.00     Total pack years: 20.00     Types: Cigarettes     Quit date: 2023     Years since quittin.3   • Smokeless tobacco: Never   Vaping Use   • Vaping Use: Never used   Substance Use Topics   • Alcohol use: Not Currently     Comment: socially    • Drug use: Never       Review of Systems   Constitutional: Negative for chills, fatigue and fever. HENT: Negative for congestion, ear pain, rhinorrhea and sore throat. Eyes: Negative for redness. Respiratory: Negative for chest tightness and shortness of breath. Cardiovascular: Negative for chest pain and palpitations. Gastrointestinal: Negative for abdominal pain, nausea and vomiting. Genitourinary: Negative for dysuria and hematuria. Musculoskeletal: Positive for back pain. Skin: Negative for rash. Neurological: Negative for dizziness, syncope, light-headedness and numbness. Psychiatric/Behavioral: The patient is nervous/anxious. Physical Exam  Physical Exam  Vitals and nursing note reviewed. Constitutional:       Appearance: She is well-developed. HENT:      Head: Normocephalic. Eyes:      General: No scleral icterus. Cardiovascular:      Rate and Rhythm: Normal rate and regular rhythm. Pulmonary:      Effort: Pulmonary effort is normal.      Breath sounds: Normal breath sounds. No stridor. Comments:  Patient in no respiratory distress, speaking in full sentences, managing oral secretions without difficulty, no accessory muscle use, retractions, or belly breathing noted, no adventitious lung sounds auscultated bilaterally. Abdominal:      General: There is no distension. Palpations: Abdomen is soft. Tenderness:  There is no abdominal tenderness. Musculoskeletal:         General: Tenderness present. Normal range of motion. Arms:       Comments: No midline spinal tenderness, deformities, crepitus, step-off, skin changes noted to the area of pain. Skin:     General: Skin is warm and dry. Capillary Refill: Capillary refill takes less than 2 seconds. Neurological:      Mental Status: She is alert and oriented to person, place, and time. Psychiatric:         Mood and Affect: Mood is anxious. Vital Signs  ED Triage Vitals [08/30/23 0804]   Temperature Pulse Respirations Blood Pressure SpO2   98 °F (36.7 °C) (!) 53 (!) 28 (!) 168/102 97 %      Temp Source Heart Rate Source Patient Position - Orthostatic VS BP Location FiO2 (%)   Tympanic Monitor Sitting Left arm --      Pain Score       --           Vitals:    08/30/23 0804 08/30/23 0853   BP: (!) 168/102 (!) 163/101   Pulse: (!) 53    Patient Position - Orthostatic VS: Sitting Sitting         Visual Acuity      ED Medications  Medications   lidocaine (LIDODERM) 5 % patch 1 patch (1 patch Topical Medication Applied 8/30/23 0822)   cyclobenzaprine (FLEXERIL) tablet 10 mg (10 mg Oral Given 8/30/23 7308)       Diagnostic Studies  Results Reviewed     None                 No orders to display              Procedures  Procedures         ED Course                               SBIRT 22yo+    Flowsheet Row Most Recent Value   Initial Alcohol Screen: US AUDIT-C     1. How often do you have a drink containing alcohol? 0 Filed at: 08/30/2023 0829   2. How many drinks containing alcohol do you have on a typical day you are drinking? 0 Filed at: 08/30/2023 0829   3a. Male UNDER 65: How often do you have five or more drinks on one occasion? 0 Filed at: 08/30/2023 0829   3b. FEMALE Any Age, or MALE 65+: How often do you have 4 or more drinks on one occassion?  0 Filed at: 08/30/2023 6722   Audit-C Score 0 Filed at: 08/30/2023 1157   VANIA: How many times in the past year have you... Used an illegal drug or used a prescription medication for non-medical reasons? Never Filed at: 08/30/2023 9450                    Medical Decision Making  14-year-old female presenting for evaluation of anxiety and back pain. Patient was noted to have a lipoma on exam reports this is where the pain is. Low suspicion for infection given lack of fevers chills overlying skin changes like erythema. Low suspicion for pneumonia or pulmonary complication such as PE, pulmonary effusion etc. given lack of respiratory complaints, fevers chills coughing etc.  Patient given follow-up information with plastic surgery and the discussion on potential removal was had with the patient. No blood work or imaging indicated at this time see below decision making criteria. Patient also complaining of anxiety however no suicidal ideations nor homicidal ideations patient does not present a harm to herself or others no indication for involuntary admission criteria at this time. Flexeril chosen for potential sedating side effects so as to alleviate both anxiety and back pain patient advised of the same and advised to follow-up with outpatient behavioral options. Denies history of severe or worsening lower extremity weakness/numbness. Denies history of saddle anesthesia/perineal anesthesia. Denies bowel or bladder incontinence/retention. History does not suggest diagnosis of cauda equina syndrome. Patient denies history of IVDA, denies history of fevers, no recent surgeries or any procedures to suggest a transient bacteremia leading to a diagnosis of subdural abscess. Denies history of blood thinner use with recent history of lumbar puncture or any violation of the epidural space to suggest history of epidural hematoma. Therefore these above diagnoses (cauda equina syndrome, epidural abscess, epidural hematoma) were not pursued with diagnostic imaging.     The patient arrives without symptoms of hypertension, specifically denying chest pain, shortness of breath, persistent headaches. The patient has a PCP and appropriate follow-up. Given this as per ACEP 2015 guidelines on asymptomatic hypertension, we will discharge this patient home for close follow-up with their PCP. Strict return to ED precautions discussed. Patient and/or family members verbalizes understanding and agrees with plan. Patient is stable for discharge     Portions of the record may have been created with voice recognition software. Occasional wrong word or "sound a like" substitutions may have occurred due to the inherent limitations of voice recognition software. Read the chart carefully and recognize, using context, where substitutions have occurred. Risk  Prescription drug management. Disposition  Final diagnoses:   Lipoma   Back pain   Anxiety     Time reflects when diagnosis was documented in both MDM as applicable and the Disposition within this note     Time User Action Codes Description Comment    8/30/2023  8:22 AM Leotis Lady Add [D17.9] Lipoma     8/30/2023  8:22 AM Leotis Lady Add [M54.9] Back pain     8/30/2023  8:22 AM Wander Law [F41.9] Anxiety       ED Disposition     ED Disposition   Discharge    Condition   Good    Date/Time   Wed Aug 30, 2023  8:21 AM    Comment   Tamanna Erazo discharge to home/self care.                Follow-up Information     Follow up With Specialties Details Why Contact Info Additional Information    [facility] Physical Therapy Schedule an appointment as soon as possible for a visit in 1 day  [phone]    [facility]  Schedule an appointment as soon as possible for a visit in 1 day for follow up for psychiatric concerns [place], [address], [place], [address] [place] [address]           Patient's Medications   Discharge Prescriptions    CYCLOBENZAPRINE (FLEXERIL) 10 MG TABLET    Take 1 tablet (10 mg total) by mouth 2 (two) times a day as needed for muscle spasms       Start Date: 8/30/2023 End Date: --       Order Dose: 10 mg       Quantity: 20 tablet    Refills: 0    LIDOCAINE (LIDODERM) 5 %    Apply 1 patch topically over 12 hours daily Remove & Discard patch within 12 hours or as directed by MD       Start Date: 8/30/2023 End Date: --       Order Dose: 1 patch       Quantity: 30 patch    Refills: 0       No discharge procedures on file.     PDMP Review     None          ED Provider  Electronically Signed by           Jayleen Yoo PA-C  08/30/23 1255

## 2023-10-02 ENCOUNTER — VBI (OUTPATIENT)
Dept: ADMINISTRATIVE | Facility: OTHER | Age: 63
End: 2023-10-02

## 2023-10-02 NOTE — TELEPHONE ENCOUNTER
10/02/23 2:17 PM     [employer] SmartForm used to document caregap status.     Philly Millan MA 29 yo WF  presents for annual gyn evaluation. In addition, pt c/o occassional vaginal bleeding postcoital. Denies d/c, dysuria, odor.   Past Medical History:   Diagnosis Date    IBS (irritable bowel syndrome)        History reviewed. No pertinent surgical history.    Family History   Problem Relation Age of Onset    Breast cancer Mother     Diabetes Mother     Hypertension Mother     Hyperlipidemia Mother     Hyperlipidemia Brother     Ovarian cancer Neg Hx        Social History     Social History    Marital status:      Spouse name: N/A    Number of children: N/A    Years of education: N/A     Social History Main Topics    Smoking status: Never Smoker    Smokeless tobacco: Never Used    Alcohol use 4.2 oz/week     7 Glasses of wine per week    Drug use: No    Sexual activity: Yes     Partners: Male     Birth control/ protection: Condom     Other Topics Concern    None     Social History Narrative    None       Current Outpatient Prescriptions   Medication Sig Dispense Refill    cholecalciferol, vitamin D3, (VITAMIN D3) 5,000 unit Tab Take 5,000 Units by mouth once daily.      ondansetron (ZOFRAN-ODT) 4 MG TbDL Take 2 tablets (8 mg total) by mouth every 8 (eight) hours as needed. 10 tablet 4    pantoprazole (PROTONIX) 40 MG tablet Tab 1 po daily for epigastric pain/nausea. 30 tablet 4    VITAMIN K2 ORAL Take by mouth once daily.       No current facility-administered medications for this visit.        Review of patient's allergies indicates:  No Known Allergies    Review of System:   General: no chills, fever, night sweats, weight gain or weight loss  Psychological: no depression or suicidal ideation  Breasts: no new or changing breast lumps, nipple discharge or masses.  Respiratory: no cough, shortness of breath, or wheezing  Cardiovascular: no chest pain or dyspnea on exertion  Gastrointestinal: no abdominal pain, change in bowel habits, or black or bloody stools  Genito-Urinary: no  incontinence, urinary frequency/urgency or vulvar/vaginal symptoms, pelvic pain. Postcoitial bleeding    General Appearance:  Alert, cooperative, no distress, appears stated age   Head:  Normocephalic, without obvious abnormality, atraumatic   Eyes:  PERRL, conjunctiva/corneas clear, EOM's intact, fundi benign, both eyes   Ears:  Normal TM's and external ear canals, both ears   Nose: Nares normal, septum midline,mucosa normal, no drainage or sinus tenderness   Throat: Lips, mucosa, and tongue normal; teeth and gums normal   Neck: Supple, symmetrical, trachea midline, no adenopathy;  thyroid: not enlarged, symmetric, no tenderness/mass/nodules; no carotid bruit or JVD   Back:   Symmetric, no curvature, ROM normal, no CVA tenderness   Lungs:   Clear to auscultation bilaterally, respirations unlabored   Breasts:  No masses or tenderness   Heart:  Regular rate and rhythm, S1 and S2 normal, no murmur, rub, or gallop   Abdomen:   Soft, non-tender, bowel sounds active all four quadrants,  no masses, no organomegaly    Genitourinary:   External rectal exam shows no thrombosed external hemorrhoids.   Pelvic exam was performed with patient supine.  No labial fusion.  There is no rash, lesion or injury on the right labia.  There is no rash, lesion or injury on the left labia.  No bleeding and no signs of injury around the vaginal introitus, urethra is without lesions and well supported. The cervix is visualized with no discharge, lesions or friability.  Vagina- moderate vaginal sidewall laxity with mild uterine prolapse and secondary vaginal shortening.  No significant Cystocele, Enterocele or rectocele, and uterus well supported.  Bimanual exam:  The urethra is normal to palpation and there are no palpable vaginal wall masses.  Uterus is not deviated, not enlarged, not fixed, normal shape and not tender.  Cervix exhibits no motion tenderness.   Right adnexum displays no mass and no tenderness.  Left adnexum displays no mass  and no tenderness.   Extremities: Extremities normal, atraumatic, no cyanosis or edema   Pulses: 2+ and symmetric   Skin: Skin color, texture, turgor normal, no rashes or lesions   Lymph nodes: Cervical, supraclavicular, and axillary nodes normal   Neurologic: Normal       PAP submitted    I discussed prolapse and likely cervical bleeding with coitus as a result. I see no overt lesions or abnormal masses or structures    Plan RTO 1 year/prn  Musculoskeletal: no gait disturbance or muscular weakness

## 2023-11-06 ENCOUNTER — OFFICE VISIT (OUTPATIENT)
Dept: FAMILY MEDICINE CLINIC | Facility: CLINIC | Age: 63
End: 2023-11-06

## 2023-11-06 ENCOUNTER — APPOINTMENT (OUTPATIENT)
Dept: LAB | Facility: CLINIC | Age: 63
End: 2023-11-06
Payer: COMMERCIAL

## 2023-11-06 VITALS
HEIGHT: 63 IN | HEART RATE: 65 BPM | OXYGEN SATURATION: 95 % | WEIGHT: 177 LBS | DIASTOLIC BLOOD PRESSURE: 96 MMHG | SYSTOLIC BLOOD PRESSURE: 168 MMHG | TEMPERATURE: 97.5 F | BODY MASS INDEX: 31.36 KG/M2 | RESPIRATION RATE: 18 BRPM

## 2023-11-06 DIAGNOSIS — I10 ESSENTIAL HYPERTENSION: ICD-10-CM

## 2023-11-06 DIAGNOSIS — H53.8 BLURRED VISION, BILATERAL: ICD-10-CM

## 2023-11-06 DIAGNOSIS — R21 RASH AND NONSPECIFIC SKIN ERUPTION: ICD-10-CM

## 2023-11-06 DIAGNOSIS — R22.2 MASS ON BACK: Primary | ICD-10-CM

## 2023-11-06 DIAGNOSIS — Z12.31 ENCOUNTER FOR SCREENING MAMMOGRAM FOR MALIGNANT NEOPLASM OF BREAST: ICD-10-CM

## 2023-11-06 DIAGNOSIS — E66.09 CLASS 1 OBESITY DUE TO EXCESS CALORIES WITH SERIOUS COMORBIDITY AND BODY MASS INDEX (BMI) OF 31.0 TO 31.9 IN ADULT: ICD-10-CM

## 2023-11-06 DIAGNOSIS — E78.2 MIXED HYPERLIPIDEMIA: ICD-10-CM

## 2023-11-06 DIAGNOSIS — Z28.21 IMMUNIZATION DECLINED: ICD-10-CM

## 2023-11-06 PROBLEM — E66.3 OVERWEIGHT: Status: RESOLVED | Noted: 2022-10-09 | Resolved: 2023-11-06

## 2023-11-06 PROBLEM — E66.811 CLASS 1 OBESITY DUE TO EXCESS CALORIES WITH SERIOUS COMORBIDITY AND BODY MASS INDEX (BMI) OF 31.0 TO 31.9 IN ADULT: Status: ACTIVE | Noted: 2023-11-06

## 2023-11-06 LAB
ALBUMIN SERPL BCP-MCNC: 4.6 G/DL (ref 3.5–5)
ALP SERPL-CCNC: 59 U/L (ref 34–104)
ALT SERPL W P-5'-P-CCNC: 16 U/L (ref 7–52)
ANION GAP SERPL CALCULATED.3IONS-SCNC: 11 MMOL/L
AST SERPL W P-5'-P-CCNC: 25 U/L (ref 13–39)
BASOPHILS # BLD AUTO: 0.02 THOUSANDS/ÂΜL (ref 0–0.1)
BASOPHILS NFR BLD AUTO: 0 % (ref 0–1)
BILIRUB SERPL-MCNC: 0.7 MG/DL (ref 0.2–1)
BUN SERPL-MCNC: 13 MG/DL (ref 5–25)
CALCIUM SERPL-MCNC: 9.7 MG/DL (ref 8.4–10.2)
CHLORIDE SERPL-SCNC: 105 MMOL/L (ref 96–108)
CHOLEST SERPL-MCNC: 237 MG/DL
CO2 SERPL-SCNC: 26 MMOL/L (ref 21–32)
CREAT SERPL-MCNC: 0.97 MG/DL (ref 0.6–1.3)
CREAT UR-MCNC: 139.5 MG/DL
EOSINOPHIL # BLD AUTO: 0.03 THOUSAND/ÂΜL (ref 0–0.61)
EOSINOPHIL NFR BLD AUTO: 1 % (ref 0–6)
ERYTHROCYTE [DISTWIDTH] IN BLOOD BY AUTOMATED COUNT: 16.3 % (ref 11.6–15.1)
GFR SERPL CREATININE-BSD FRML MDRD: 62 ML/MIN/1.73SQ M
GLUCOSE P FAST SERPL-MCNC: 88 MG/DL (ref 65–99)
HCT VFR BLD AUTO: 38.8 % (ref 34.8–46.1)
HDLC SERPL-MCNC: 52 MG/DL
HGB BLD-MCNC: 12.7 G/DL (ref 11.5–15.4)
IMM GRANULOCYTES # BLD AUTO: 0.01 THOUSAND/UL (ref 0–0.2)
IMM GRANULOCYTES NFR BLD AUTO: 0 % (ref 0–2)
LDLC SERPL CALC-MCNC: 167 MG/DL (ref 0–100)
LYMPHOCYTES # BLD AUTO: 2.11 THOUSANDS/ÂΜL (ref 0.6–4.47)
LYMPHOCYTES NFR BLD AUTO: 36 % (ref 14–44)
MCH RBC QN AUTO: 29.2 PG (ref 26.8–34.3)
MCHC RBC AUTO-ENTMCNC: 32.7 G/DL (ref 31.4–37.4)
MCV RBC AUTO: 89 FL (ref 82–98)
MICROALBUMIN UR-MCNC: 72.1 MG/L
MICROALBUMIN/CREAT 24H UR: 52 MG/G CREATININE (ref 0–30)
MONOCYTES # BLD AUTO: 0.42 THOUSAND/ÂΜL (ref 0.17–1.22)
MONOCYTES NFR BLD AUTO: 7 % (ref 4–12)
NEUTROPHILS # BLD AUTO: 3.23 THOUSANDS/ÂΜL (ref 1.85–7.62)
NEUTS SEG NFR BLD AUTO: 56 % (ref 43–75)
NONHDLC SERPL-MCNC: 185 MG/DL
NRBC BLD AUTO-RTO: 0 /100 WBCS
PLATELET # BLD AUTO: 326 THOUSANDS/UL (ref 149–390)
PMV BLD AUTO: 10.2 FL (ref 8.9–12.7)
POTASSIUM SERPL-SCNC: 3.7 MMOL/L (ref 3.5–5.3)
PROT SERPL-MCNC: 7.6 G/DL (ref 6.4–8.4)
RBC # BLD AUTO: 4.35 MILLION/UL (ref 3.81–5.12)
SODIUM SERPL-SCNC: 142 MMOL/L (ref 135–147)
TRIGL SERPL-MCNC: 90 MG/DL
WBC # BLD AUTO: 5.82 THOUSAND/UL (ref 4.31–10.16)

## 2023-11-06 PROCEDURE — 82570 ASSAY OF URINE CREATININE: CPT

## 2023-11-06 PROCEDURE — 80053 COMPREHEN METABOLIC PANEL: CPT

## 2023-11-06 PROCEDURE — 82043 UR ALBUMIN QUANTITATIVE: CPT

## 2023-11-06 PROCEDURE — 36415 COLL VENOUS BLD VENIPUNCTURE: CPT

## 2023-11-06 PROCEDURE — 99214 OFFICE O/P EST MOD 30 MIN: CPT

## 2023-11-06 PROCEDURE — 85025 COMPLETE CBC W/AUTO DIFF WBC: CPT

## 2023-11-06 PROCEDURE — 80061 LIPID PANEL: CPT

## 2023-11-06 PROCEDURE — 83036 HEMOGLOBIN GLYCOSYLATED A1C: CPT

## 2023-11-06 RX ORDER — LIDOCAINE 50 MG/G
1 PATCH TOPICAL DAILY PRN
Qty: 30 PATCH | Refills: 1 | Status: SHIPPED | OUTPATIENT
Start: 2023-11-06

## 2023-11-06 RX ORDER — NYSTATIN 100000 U/G
CREAM TOPICAL 2 TIMES DAILY
Qty: 30 G | Refills: 0 | Status: SHIPPED | OUTPATIENT
Start: 2023-11-06 | End: 2023-11-20

## 2023-11-06 RX ORDER — AMLODIPINE BESYLATE 10 MG/1
10 TABLET ORAL DAILY
Qty: 30 TABLET | Refills: 2 | Status: SHIPPED | OUTPATIENT
Start: 2023-11-06

## 2023-11-06 NOTE — ASSESSMENT & PLAN NOTE
Pt declines offered influenza vaccine at this time despite discussion of risks versus benefits of immunization.

## 2023-11-06 NOTE — PROGRESS NOTES
Name: Gisele Angelucci      : 1960      MRN: [id number]  Encounter Provider: ANALI Harris  Encounter Date: 2023   Encounter department: [address] [facility]     1. Mass on back  Assessment & Plan:  Appears to be lipoma but causing pain for pt.   - US, likely referral to General Surgery. Orders:  -     US superficial lump (non extremity); Future; Expected date: 2023  -     lidocaine (Lidoderm) 5 %; Apply 1 patch topically over 12 hours daily as needed (pain on back) Remove & Discard patch within 12 hours or as directed by MD    2. Essential hypertension  Assessment & Plan:  Uncontrolled, BP at 168/96. Pt does not want to take pills. Intermittent headache and visual changes. - Strongly encouraged pt to resume BP medication and explained health risks of uncontrolled high blood pressure.   - Restart amlodipine, return for BP recheck. - Low salt diet. - Blood work and microalbumin. - Referred to Ophthalmology. Orders:  -     amLODIPine (NORVASC) 10 mg tablet; Take 1 tablet (10 mg total) by mouth daily  -     Ambulatory Referral to Ophthalmology; Future  -     CBC and differential; Future  -     Comprehensive metabolic panel; Future  -     Albumin / creatinine urine ratio; Future    3. Blurred vision, bilateral  Assessment & Plan:  Intermittent, may be associated with uncontrolled BP.   - Referred to Ophthalmology. Orders:  -     Ambulatory Referral to Ophthalmology; Future    4. Class 1 obesity due to excess calories with serious comorbidity and body mass index (BMI) of 31.0 to 31.9 in adult  Assessment & Plan: Body mass index is 31.35 kg/m². The BMI is above normal.   - Nutrition recommendations include decreasing portion sizes, encouraging healthy choices of fruits and vegetables, limiting drinks that contain sugar and moderation in carbohydrate intake.    - Exercise recommendations include moderate physical activity 150 minutes/week. Orders:  -     Hemoglobin A1C; Future  -     Lipid panel; Future    5. Mixed hyperlipidemia  Assessment & Plan:  Not taking prescribed rosuvastatin. - Lipid panel. Orders:  -     Lipid panel; Future    6. Encounter for screening mammogram for malignant neoplasm of breast  -     Mammo screening bilateral w 3d & cad; Future; Expected date: 11/06/2023    7. Rash and nonspecific skin eruption  Assessment & Plan:  Hyperpigmented area with tenderness underneath bilateral breasts. No open areas or drainage.  - Nystatin cream under breasts BID. Orders:  -     nystatin (MYCOSTATIN) cream; Apply topically 2 (two) times a day for 14 days    8. Immunization declined  Assessment & Plan:  Pt declines offered influenza vaccine at this time despite discussion of risks versus benefits of immunization. BMI Counseling: Body mass index is 31.35 kg/m². The BMI is above normal. Nutrition recommendations include decreasing portion sizes, encouraging healthy choices of fruits and vegetables, limiting drinks that contain sugar and moderation in carbohydrate intake. Exercise recommendations include moderate physical activity 150 minutes/week. Rationale for BMI follow-up plan is due to patient being overweight or obese. Subjective     HPI    Hermelindo Poe presents to the office accompanied by her  for c/o painful lump on right mid back. Pt is a poor historian, looks to  to provide history. Pt has had a lump in this area for 15 to 20 years. It has gotten somewhat bigger over time. It is painful when she lays on it. Advil, Aleve, and Tylenol have not been effective. No other treatments tried. An US was ordered to assess this mass 1 year ago but not completed. Pt would like the mass removed. Review of Systems   Constitutional:  Negative for fatigue, fever and unexpected weight change. Eyes:  Positive for visual disturbance. Negative for photophobia, pain, discharge, redness and itching. Respiratory:  Negative for cough, chest tightness, shortness of breath and wheezing. Cardiovascular:  Negative for chest pain, palpitations and leg swelling. Musculoskeletal:         Painful lump right back. Skin:  Positive for rash. Neurological:  Positive for headaches. Negative for dizziness, syncope, weakness, light-headedness and numbness. All other systems reviewed and are negative.       Past Medical History:   Diagnosis Date    Anxiety     Chronic pain     back pain     Depression     Diabetes mellitus (HCC)     Disease of thyroid gland     hypothyroid     Hyperlipidemia     Hypertension     Psychiatric disorder      Past Surgical History:   Procedure Laterality Date    DENTAL SURGERY      HYSTERECTOMY      over 20 years ago     Family History   Problem Relation Age of Onset    Dementia Mother     Cancer Maternal Aunt     Cancer Maternal Uncle      Social History     Socioeconomic History    Marital status: /Civil Union     Spouse name: None    Number of children: None    Years of education: None    Highest education level: None   Occupational History    None   Tobacco Use    Smoking status: Former     Packs/day: 0.50     Years: 40.00     Total pack years: 20.00     Types: Cigarettes     Quit date: 2023     Years since quittin.5     Passive exposure: Past    Smokeless tobacco: Never   Vaping Use    Vaping Use: Never used   Substance and Sexual Activity    Alcohol use: Not Currently     Comment: socially     Drug use: Never    Sexual activity: Yes   Other Topics Concern    None   Social History Narrative    Flu shot:no     Social Determinants of Health     Financial Resource Strain: Low Risk  (10/6/2022)    Overall Financial Resource Strain (CARDIA)     Difficulty of Paying Living Expenses: Not hard at all   Food Insecurity: No Food Insecurity (10/6/2022)    Hunger Vital Sign     Worried About Running Out of Food in the Last Year: Never true     Ran Out of Food in the Last Year: Never true   Transportation Needs: No Transportation Needs (10/6/2022)    PRAPARE - Transportation     Lack of Transportation (Medical): No     Lack of Transportation (Non-Medical): No   Physical Activity: Not on file   Stress: Not on file   Social Connections: Not on file   Intimate Partner Violence: Not on file   Housing Stability: Not on file     Current Outpatient Medications on File Prior to Visit   Medication Sig    acetaminophen (TYLENOL) 650 mg CR tablet Take 1 tablet (650 mg total) by mouth every 8 (eight) hours as needed for mild pain    cyclobenzaprine (FLEXERIL) 10 mg tablet Take 1 tablet (10 mg total) by mouth 2 (two) times a day as needed for muscle spasms    hydrOXYzine HCL (ATARAX) 25 mg tablet Take 1 tablet (25 mg total) by mouth every 6 (six) hours    lidocaine (Lidoderm) 5 % Apply 1 patch topically over 12 hours daily Remove & Discard patch within 12 hours or as directed by MD    rosuvastatin (CRESTOR) 10 MG tablet Take 1 tablet (10 mg total) by mouth daily    [DISCONTINUED] amLODIPine (NORVASC) 10 mg tablet Take 1 tablet (10 mg total) by mouth daily     Allergies   Allergen Reactions    Aspirin      Patient does not know    Duloxetine Other (See Comments)       There is no immunization history on file for this patient. Objective     /96 (BP Location: Right arm, Patient Position: Sitting, Cuff Size: Standard)   Pulse 65   Temp 97.5 °F (36.4 °C) (Temporal)   Resp 18   Ht 5' 3" (1.6 m)   Wt 80.3 kg (177 lb)   SpO2 95%   BMI 31.35 kg/m²     Physical Exam  Vitals reviewed. Chaperone present:  present, declined chaperone. Constitutional:       General: She is not in acute distress. Appearance: She is obese. She is not ill-appearing or diaphoretic. HENT:      Head: Normocephalic and atraumatic.       Mouth/Throat:      Mouth: Mucous membranes are moist.   Eyes:      General: Lids are normal.      Conjunctiva/sclera: Conjunctivae normal.      Pupils: Pupils are equal, round, and reactive to light. Cardiovascular:      Rate and Rhythm: Normal rate and regular rhythm. Heart sounds: Normal heart sounds. No murmur heard. Pulmonary:      Effort: Pulmonary effort is normal. No tachypnea. Breath sounds: Normal breath sounds. No decreased breath sounds, wheezing or rales. Chest:      Chest wall: Tenderness (and hyperpigmentation of skin underneath bilateral breasts) present. Musculoskeletal:      Cervical back: Normal.      Lumbar back: Normal.      Right lower leg: No edema. Left lower leg: No edema. Comments: Soft, mobile, oval mass 3 x 4" right midback near axilla. Skin:     General: Skin is warm and dry. Neurological:      Mental Status: She is alert and oriented to person, place, and time. Motor: Motor function is intact. No weakness. Gait: Gait is intact.    Psychiatric:         Attention and Perception: Attention normal.         Mood and Affect: Mood and affect normal.         Speech: Speech normal.         Behavior: Behavior normal.       ANALI Mazariegos

## 2023-11-06 NOTE — ASSESSMENT & PLAN NOTE
Uncontrolled, BP at 168/96. Pt does not want to take pills. Intermittent headache and visual changes. - Strongly encouraged pt to resume BP medication and explained health risks of uncontrolled high blood pressure.   - Restart amlodipine, return for BP recheck. - Low salt diet. - Blood work and microalbumin. - Referred to Ophthalmology.

## 2023-11-06 NOTE — ASSESSMENT & PLAN NOTE
Hyperpigmented area with tenderness underneath bilateral breasts. No open areas or drainage.  - Nystatin cream under breasts BID.

## 2023-11-06 NOTE — ASSESSMENT & PLAN NOTE
Body mass index is 31.35 kg/m². The BMI is above normal.   - Nutrition recommendations include decreasing portion sizes, encouraging healthy choices of fruits and vegetables, limiting drinks that contain sugar and moderation in carbohydrate intake. - Exercise recommendations include moderate physical activity 150 minutes/week.

## 2023-11-07 LAB
EST. AVERAGE GLUCOSE BLD GHB EST-MCNC: 140 MG/DL
HBA1C MFR BLD: 6.5 %

## 2023-11-08 ENCOUNTER — HOSPITAL ENCOUNTER (OUTPATIENT)
Dept: MAMMOGRAPHY | Facility: CLINIC | Age: 63
Discharge: HOME | End: 2023-11-08
Payer: COMMERCIAL

## 2023-11-08 ENCOUNTER — HOSPITAL ENCOUNTER (OUTPATIENT)
Dept: ULTRASOUND IMAGING | Facility: HOSPITAL | Age: 63
Discharge: HOME | End: 2023-11-08
Payer: COMMERCIAL

## 2023-11-08 DIAGNOSIS — R92.8 ABNORMAL MAMMOGRAM: ICD-10-CM

## 2023-11-08 DIAGNOSIS — Z12.31 ENCOUNTER FOR SCREENING MAMMOGRAM FOR MALIGNANT NEOPLASM OF BREAST: ICD-10-CM

## 2023-11-08 DIAGNOSIS — E78.2 MIXED HYPERLIPIDEMIA: ICD-10-CM

## 2023-11-08 DIAGNOSIS — R22.2 MASS ON BACK: ICD-10-CM

## 2023-11-08 PROCEDURE — 76705 ECHO EXAM OF ABDOMEN: CPT

## 2023-11-08 RX ORDER — ROSUVASTATIN CALCIUM 10 MG/1
10 TABLET, COATED ORAL DAILY
Qty: 30 TABLET | Refills: 2 | Status: SHIPPED | OUTPATIENT
Start: 2023-11-08

## 2023-11-15 DIAGNOSIS — R22.2 MASS ON BACK: Primary | ICD-10-CM

## 2023-11-17 ENCOUNTER — HOSPITAL ENCOUNTER (OUTPATIENT)
Dept: ULTRASOUND IMAGING | Facility: CLINIC | Age: 63
Discharge: HOME | End: 2023-11-17
Payer: COMMERCIAL

## 2023-11-17 ENCOUNTER — HOSPITAL ENCOUNTER (OUTPATIENT)
Dept: MAMMOGRAPHY | Facility: CLINIC | Age: 63
Discharge: HOME | End: 2023-11-17
Payer: COMMERCIAL

## 2023-11-17 DIAGNOSIS — R92.8 ABNORMAL MAMMOGRAM: ICD-10-CM

## 2023-11-17 PROCEDURE — 76642 ULTRASOUND BREAST LIMITED: CPT

## 2023-11-17 PROCEDURE — G0279 TOMOSYNTHESIS, MAMMO: HCPCS

## 2023-11-17 PROCEDURE — 77066 DX MAMMO INCL CAD BI: CPT

## 2023-11-29 ENCOUNTER — TELEPHONE (OUTPATIENT)
Dept: FAMILY MEDICINE CLINIC | Facility: CLINIC | Age: 63
End: 2023-11-29

## 2023-11-29 NOTE — TELEPHONE ENCOUNTER
Hello, I'm calling in reference to Dasia Mistry. I'm trying to find out she has an appointment. Her YOB: 1960. Phone number is [phone]. I'd forgotten well called [phone]. Thank you. Appointment information given.

## 2023-11-30 ENCOUNTER — OFFICE VISIT (OUTPATIENT)
Dept: SURGERY | Facility: CLINIC | Age: 63
End: 2023-11-30
Payer: COMMERCIAL

## 2023-11-30 VITALS
SYSTOLIC BLOOD PRESSURE: 128 MMHG | HEART RATE: 60 BPM | BODY MASS INDEX: 31.18 KG/M2 | WEIGHT: 176 LBS | DIASTOLIC BLOOD PRESSURE: 84 MMHG | TEMPERATURE: 97.5 F | HEIGHT: 63 IN

## 2023-11-30 DIAGNOSIS — Z12.11 ENCOUNTER FOR SCREENING COLONOSCOPY: ICD-10-CM

## 2023-11-30 DIAGNOSIS — D17.1 LIPOMA OF TORSO: Primary | ICD-10-CM

## 2023-11-30 DIAGNOSIS — R22.2 MASS ON BACK: ICD-10-CM

## 2023-11-30 PROCEDURE — 99243 OFF/OP CNSLTJ NEW/EST LOW 30: CPT | Performed by: PHYSICIAN ASSISTANT

## 2023-11-30 NOTE — PROGRESS NOTES
Assessment/Plan:   Ana Maria Freidman is a 61 y. o.female who is here for   Chief Complaint   Patient presents with    Mass     Under right arm. She had it since she was in her 42's. It is growing in size and hurts. On exam found to have Lipoma of the : upper right back. Never had a colonoscopy prior. Plan:   Excise lesion(s) under anesthesia in the operating room right upper back  First Screening Colonoscopy    Positioning: lateral, right side up    Post Op Pain Management:   Norco, Motrin, and Tylenol    - Patient has been instructed to avoid herbs or non-directed vitamins the week prior to surgery to ensure no drug interactions with perioperative surgical and anesthetic medications. - Patient should continue beta-blocker medication up through and including the day of surgery but hold any other hypertensive medications, including diuretics, unless instructed by PCP or anesthesia  - Patient should continue his statin medication up through and including the day of surgery.  - Hold metformin , If on this medication, the morning of surgery and do not resume until 48 hours AFTER surgery to avoid risk of lactic acidosis. Do not resume if eGFR is < 30  - Insulin Management:If on Insulin, patient advised to call PCP for explicit instructions. In general, will need to take one-half normal dose am of surgery but pt advised to consult PCP before making any changes. - Patient has been instructed to avoid aspirin containing medications or non-steroidal anti-inflammatory drugs for SEVEN days preceding surgery. Preoperative Clearance: None    _____________________________________________________  CC: Mass (Under right arm. She had it since she was in her 42's. It is growing in size and hurts. )  . HPI:  Ana Maria Friedman is a 61 y. o.female who was referred for evaluation of Mass (Under right arm. She had it since she was in her 42's. It is growing in size and hurts. )  .     Currently patient reports patient has had a mass on her right back x 20 years which has grown significantly over the last few years. She did have US which showed 7 cm lipoma. This is starting to hurt when she lays on her right side. No pain otherwise. Reports: growing and painful. Location: upper back right     11/23: Prominent lipomatous tissue versus a lipomatous lesion. No specifically worrisome sonographic features are evident however if there is clinically progressive growth or other concerning symptoms develop, further surgical evaluation may be warranted. ROS:  General ROS: negative  negative for - chills, fatigue, fever or night sweats, weight loss  Respiratory ROS: no cough, shortness of breath, or wheezing  Cardiovascular ROS: no chest pain or dyspnea on exertion  Genito-Urinary ROS: no dysuria, trouble voiding, or hematuria  Musculoskeletal ROS: negative for - gait disturbance, joint pain or muscle pain  Neurological ROS: no TIA or stroke symptoms  Skin ROS: See HPI  GI ROS: see HPI  Skin ROS: no new rashes or lesions   Lymphatic ROS: no new adenopathy noted by pt.    Psy ROS: no new mental or behavioral disturbances       Patient Active Problem List   Diagnosis    Syncope    At risk for polypharmacy    Bradycardia    Hypotension    Essential hypertension    Mixed hyperlipidemia    Anxiety and depression    Lumbar back pain    History of thyroid disorder    Mass on back    Neck pain    Tobacco dependency    Insertional Achilles tendinopathy    Memory deficit    Class 1 obesity due to excess calories with serious comorbidity and body mass index (BMI) of 31.0 to 31.9 in adult    Blurred vision, bilateral    Rash and nonspecific skin eruption    Immunization declined         Allergies:  Aspirin and Duloxetine      Current Outpatient Medications:     acetaminophen (TYLENOL) 650 mg CR tablet, Take 1 tablet (650 mg total) by mouth every 8 (eight) hours as needed for mild pain, Disp: 30 tablet, Rfl: 0    amLODIPine (NORVASC) 10 mg tablet, Take 1 tablet (10 mg total) by mouth daily, Disp: 30 tablet, Rfl: 2    cyclobenzaprine (FLEXERIL) 10 mg tablet, Take 1 tablet (10 mg total) by mouth 2 (two) times a day as needed for muscle spasms, Disp: 20 tablet, Rfl: 0    hydrOXYzine HCL (ATARAX) 25 mg tablet, Take 1 tablet (25 mg total) by mouth every 6 (six) hours, Disp: 12 tablet, Rfl: 0    lidocaine (Lidoderm) 5 %, Apply 1 patch topically over 12 hours daily Remove & Discard patch within 12 hours or as directed by MD, Disp: 30 patch, Rfl: 0    rosuvastatin (CRESTOR) 10 MG tablet, Take 1 tablet (10 mg total) by mouth daily, Disp: 30 tablet, Rfl: 2    lidocaine (Lidoderm) 5 %, Apply 1 patch topically over 12 hours daily as needed (pain on back) Remove & Discard patch within 12 hours or as directed by MD (Patient not taking: Reported on 11/30/2023), Disp: 30 patch, Rfl: 1    nystatin (MYCOSTATIN) cream, Apply topically 2 (two) times a day for 14 days, Disp: 30 g, Rfl: 0    Past Medical History:   Diagnosis Date    Anxiety     Chronic pain     back pain     Depression     Diabetes mellitus ([address])     Disease of thyroid gland     hypothyroid     Hyperlipidemia     Hypertension     Psychiatric disorder        Past Surgical History:   Procedure Laterality Date    DENTAL SURGERY      HYSTERECTOMY      over 20 years ago       Family History   Problem Relation Age of Onset    Dementia Mother     Cancer Maternal Aunt     Cancer Maternal Uncle         reports that she quit smoking about 7 months ago. Her smoking use included cigarettes. She has a 20.00 pack-year smoking history. She has been exposed to tobacco smoke. She has never used smokeless tobacco. She reports current alcohol use. She reports that she does not use drugs.     Vitals:    11/30/23 1251   BP: 128/84   Pulse: 60   Temp: 97.5 °F (36.4 °C)        PHYSICAL EXAM  General Appearance:    Alert, cooperative, no distress,    Head:    Normocephalic without obvious abnormality   Eyes:    PERRL, conjunctiva/corneas clear Neck:   Supple, no adenopathy, no JVD   Back:     Symmetric, no spinal or CVA tenderness   Lungs:     Clear to auscultation bilaterally, no wheezing or rhonchi   Heart:    Regular rate and rhythm, S1 and S2 normal, no murmur   Abdomen:     Benign, no rebound or guarding. Extremities:   Extremities normal. No clubbing, cyanosis or edema   Psych:   Normal Affect, AOx3. Neurologic:  Skin:   CNII-XII intact. Strength symmetric, speech intact    Warm, dry, intact, no visible rashes or lesions except as follows: Soft, mobile, oval mass 3 x 4" right midback near axilla.                     Brandon Miramontes PA-C    Date: 11/30/2023 Time: 1:32 PM

## 2023-12-18 ENCOUNTER — VBI (OUTPATIENT)
Dept: ADMINISTRATIVE | Facility: OTHER | Age: 63
End: 2023-12-18

## 2024-01-21 RX ORDER — SODIUM CHLORIDE 9 MG/ML
125 INJECTION, SOLUTION INTRAVENOUS CONTINUOUS
Status: CANCELLED | OUTPATIENT
Start: 2024-01-21

## 2024-01-23 ENCOUNTER — ANESTHESIA (OUTPATIENT)
Dept: GASTROENTEROLOGY | Facility: HOSPITAL | Age: 64
End: 2024-01-23

## 2024-01-23 ENCOUNTER — ANESTHESIA EVENT (OUTPATIENT)
Dept: GASTROENTEROLOGY | Facility: HOSPITAL | Age: 64
End: 2024-01-23

## 2024-01-23 ENCOUNTER — HOSPITAL ENCOUNTER (OUTPATIENT)
Dept: GASTROENTEROLOGY | Facility: HOSPITAL | Age: 64
Setting detail: OUTPATIENT SURGERY
Discharge: HOME | End: 2024-01-23
Attending: SURGERY
Payer: COMMERCIAL

## 2024-01-23 VITALS
WEIGHT: 176 LBS | BODY MASS INDEX: 31.18 KG/M2 | HEART RATE: 82 BPM | OXYGEN SATURATION: 97 % | DIASTOLIC BLOOD PRESSURE: 71 MMHG | HEIGHT: 63 IN | TEMPERATURE: 97.4 F | SYSTOLIC BLOOD PRESSURE: 103 MMHG | RESPIRATION RATE: 16 BRPM

## 2024-01-23 DIAGNOSIS — Z12.11 COLON CANCER SCREENING: ICD-10-CM

## 2024-01-23 PROBLEM — E07.9 DISEASE OF THYROID GLAND: Status: ACTIVE | Noted: 2024-01-23

## 2024-01-23 PROBLEM — F99 PSYCHIATRIC DISORDER: Status: ACTIVE | Noted: 2024-01-23

## 2024-01-23 PROBLEM — F32.A DEPRESSION: Status: ACTIVE | Noted: 2024-01-23

## 2024-01-23 LAB — GLUCOSE SERPL-MCNC: 99 MG/DL (ref 65–140)

## 2024-01-23 PROCEDURE — 88344 IMHCHEM/IMCYTCHM EA MLT ANTB: CPT | Performed by: PATHOLOGY

## 2024-01-23 PROCEDURE — 88305 TISSUE EXAM BY PATHOLOGIST: CPT | Performed by: PATHOLOGY

## 2024-01-23 PROCEDURE — 82948 REAGENT STRIP/BLOOD GLUCOSE: CPT

## 2024-01-23 RX ORDER — GLYCOPYRROLATE 0.2 MG/ML
INJECTION INTRAMUSCULAR; INTRAVENOUS AS NEEDED
Status: DISCONTINUED | OUTPATIENT
Start: 2024-01-23 | End: 2024-01-23

## 2024-01-23 RX ORDER — PROPOFOL 10 MG/ML
INJECTION, EMULSION INTRAVENOUS AS NEEDED
Status: DISCONTINUED | OUTPATIENT
Start: 2024-01-23 | End: 2024-01-23

## 2024-01-23 RX ORDER — LIDOCAINE HYDROCHLORIDE 20 MG/ML
INJECTION, SOLUTION EPIDURAL; INFILTRATION; INTRACAUDAL; PERINEURAL AS NEEDED
Status: DISCONTINUED | OUTPATIENT
Start: 2024-01-23 | End: 2024-01-23

## 2024-01-23 RX ORDER — SODIUM CHLORIDE 9 MG/ML
125 INJECTION, SOLUTION INTRAVENOUS CONTINUOUS
Status: DISCONTINUED | OUTPATIENT
Start: 2024-01-23 | End: 2024-01-27 | Stop reason: HOSPADM

## 2024-01-23 RX ADMIN — PROPOFOL 20 MG: 10 INJECTION, EMULSION INTRAVENOUS at 09:04

## 2024-01-23 RX ADMIN — GLYCOPYRROLATE 0.2 MG: 0.2 INJECTION, SOLUTION INTRAMUSCULAR; INTRAVENOUS at 08:47

## 2024-01-23 RX ADMIN — PROPOFOL 10 MG: 10 INJECTION, EMULSION INTRAVENOUS at 09:05

## 2024-01-23 RX ADMIN — PROPOFOL 30 MG: 10 INJECTION, EMULSION INTRAVENOUS at 09:15

## 2024-01-23 RX ADMIN — PROPOFOL 10 MG: 10 INJECTION, EMULSION INTRAVENOUS at 08:50

## 2024-01-23 RX ADMIN — SODIUM CHLORIDE 125 ML/HR: 0.9 INJECTION, SOLUTION INTRAVENOUS at 08:36

## 2024-01-23 RX ADMIN — PROPOFOL 10 MG: 10 INJECTION, EMULSION INTRAVENOUS at 09:07

## 2024-01-23 RX ADMIN — PROPOFOL 150 MG: 10 INJECTION, EMULSION INTRAVENOUS at 08:47

## 2024-01-23 RX ADMIN — PROPOFOL 10 MG: 10 INJECTION, EMULSION INTRAVENOUS at 09:01

## 2024-01-23 RX ADMIN — PROPOFOL 10 MG: 10 INJECTION, EMULSION INTRAVENOUS at 09:12

## 2024-01-23 RX ADMIN — PROPOFOL 30 MG: 10 INJECTION, EMULSION INTRAVENOUS at 09:17

## 2024-01-23 RX ADMIN — PROPOFOL 50 MG: 10 INJECTION, EMULSION INTRAVENOUS at 08:48

## 2024-01-23 RX ADMIN — PROPOFOL 10 MG: 10 INJECTION, EMULSION INTRAVENOUS at 08:58

## 2024-01-23 RX ADMIN — PROPOFOL 10 MG: 10 INJECTION, EMULSION INTRAVENOUS at 08:59

## 2024-01-23 RX ADMIN — PROPOFOL 10 MG: 10 INJECTION, EMULSION INTRAVENOUS at 08:55

## 2024-01-23 RX ADMIN — LIDOCAINE HYDROCHLORIDE 80 MG: 20 INJECTION, SOLUTION EPIDURAL; INFILTRATION; INTRACAUDAL at 08:47

## 2024-01-23 RX ADMIN — PROPOFOL 20 MG: 10 INJECTION, EMULSION INTRAVENOUS at 08:53

## 2024-01-23 RX ADMIN — PROPOFOL 10 MG: 10 INJECTION, EMULSION INTRAVENOUS at 08:51

## 2024-01-23 RX ADMIN — PROPOFOL 10 MG: 10 INJECTION, EMULSION INTRAVENOUS at 09:09

## 2024-01-23 NOTE — DISCHARGE INSTR - AVS FIRST PAGE
[facility]  Endoscopy Post-Operative Instructions  Dr. Jade Farah MD, FACS    Procedure: Colonoscopy    You have a low rectal polyp or hemorrhoid.  Difficult to tell on colonoscopy.  Will await biopsy but please see me in the office to consider exam under anesthesia and removal of this area.    Findings:  Diverticulosis and Colon Polyp(s)    Follow-Up: You will need a repeat Endoscopy in (generally) 3  years.     Will await final pathology report for final determination of number of years until your follow up endoscopy, if you had polyps on this exam.  Different types of polyps require different lengths of follow up surveillance. Please call our office or your primary doctor's office if you have any questions, once the report is returned.        You should have an endoscopy sooner than recommended if you have any symptoms of bleeding or change in stools or other concerns.      You will receive a call from our office with your results, in addition to the the preliminary results you received today.      You will usually receive a follow-up letter from our office in 1-2 weeks.  Call the office if you do not hear from us. You are welcome to also schedule an office visit if desired to discuss the results further.     It is your responsibility to contact our office for results in 1- 2 weeks if you do not hear from us.    If a follow up endoscopy is needed, you are responsible for arranging that follow up appointment at the appropriate time.  The office may or may not issue a reminder at that future time.  Please take responsibility for your own follow up healthcare.      Diet: Eat a light snack first, and then resume your previous diet.    Call the office if you have unusual fevers, chills, nausea or vomiting or abdominal pain.  Report to the emergency room with these are severe in nature.      Activity: Do not drive a car, operate machinery, or sign legal documents for 24 hours after your  procedure. Normal activity may be resumed on the day following the procedure.     Call the office at [phone] for any of the following: Severe abdominal pain, significant rectal bleeding, chills, or fever above 100°, new onset of persistent cough or persistent vomiting.     [facility]  [address] [place] [address], [address]  [place] [address]  Phone: [phone]

## 2024-01-23 NOTE — H&P (VIEW-ONLY)
Assessment/Plan:   Latoya Lewis is a 63 y.o.female who is here for   No chief complaint on file.    Today for for screening colonoscopy.    On exam found to have Lipoma of the : upper right back. Never had a colonoscopy prior.    Plan:   Excise lesion(s) under anesthesia in the operating room right upper back  First Screening Colonoscopy    Positioning: lateral, right side up    Post Op Pain Management:   Norco, Motrin, and Tylenol    - Patient has been instructed to avoid herbs or non-directed vitamins the week prior to surgery to ensure no drug interactions with perioperative surgical and anesthetic medications.  - Patient should continue beta-blocker medication up through and including the day of surgery but hold any other hypertensive medications, including diuretics, unless instructed by PCP or anesthesia  - Patient should continue his statin medication up through and including the day of surgery.  - Hold metformin , If on this medication, the morning of surgery and do not resume until 48 hours AFTER surgery to avoid risk of lactic acidosis. Do not resume if eGFR is < 30  - Insulin Management:If on Insulin, patient advised to call PCP for explicit instructions. In general, will need to take one-half normal dose am of surgery but pt advised to consult PCP before making any changes.   - Patient has been instructed to avoid aspirin containing medications or non-steroidal anti-inflammatory drugs for SEVEN days preceding surgery.    Preoperative Clearance: None    _____________________________________________________  CC:No chief complaint on file.  .    HPI:  Latoya Lewis is a 63 y.o.female who was referred for evaluation of No chief complaint on file.  .    Currently patient reports patient has had a mass on her right back x 20 years which has grown significantly over the last few years. She did have US which showed 7 cm lipoma.  This is starting to hurt when she lays on her right side. No pain otherwise.  Reports: growing and painful. Location: upper back right     11/23: Prominent lipomatous tissue versus a lipomatous lesion.  No specifically worrisome sonographic features are evident however if there is clinically progressive growth or other concerning symptoms develop, further surgical evaluation may be warranted.       ROS:  General ROS: negative  negative for - chills, fatigue, fever or night sweats, weight loss  Respiratory ROS: no cough, shortness of breath, or wheezing  Cardiovascular ROS: no chest pain or dyspnea on exertion  Genito-Urinary ROS: no dysuria, trouble voiding, or hematuria  Musculoskeletal ROS: negative for - gait disturbance, joint pain or muscle pain  Neurological ROS: no TIA or stroke symptoms  Skin ROS: See HPI  GI ROS: see HPI  Skin ROS: no new rashes or lesions   Lymphatic ROS: no new adenopathy noted by pt.   Psy ROS: no new mental or behavioral disturbances       Patient Active Problem List   Diagnosis    Syncope    At risk for polypharmacy    Bradycardia    Hypotension    Essential hypertension    Mixed hyperlipidemia    Diabetes mellitus (HCC)    Anxiety    Lumbar back pain    History of thyroid disorder    Mass on back    Neck pain    Tobacco dependency    Insertional Achilles tendinopathy    Memory deficit    Class 1 obesity due to excess calories with serious comorbidity and body mass index (BMI) of 31.0 to 31.9 in adult    Blurred vision, bilateral    Rash and nonspecific skin eruption    Immunization declined    Psychiatric disorder    Depression    Disease of thyroid gland         Allergies:  Aspirin and Duloxetine      Current Outpatient Medications:     acetaminophen (TYLENOL) 650 mg CR tablet, Take 1 tablet (650 mg total) by mouth every 8 (eight) hours as needed for mild pain, Disp: 30 tablet, Rfl: 0    amLODIPine (NORVASC) 10 mg tablet, Take 1 tablet (10 mg total) by mouth daily, Disp: 30 tablet, Rfl: 2    cyclobenzaprine (FLEXERIL) 10 mg tablet, Take 1 tablet (10 mg total)  by mouth 2 (two) times a day as needed for muscle spasms, Disp: 20 tablet, Rfl: 0    hydrOXYzine HCL (ATARAX) 25 mg tablet, Take 1 tablet (25 mg total) by mouth every 6 (six) hours, Disp: 12 tablet, Rfl: 0    rosuvastatin (CRESTOR) 10 MG tablet, Take 1 tablet (10 mg total) by mouth daily, Disp: 30 tablet, Rfl: 2    lidocaine (Lidoderm) 5 %, Apply 1 patch topically over 12 hours daily Remove & Discard patch within 12 hours or as directed by MD, Disp: 30 patch, Rfl: 0    lidocaine (Lidoderm) 5 %, Apply 1 patch topically over 12 hours daily as needed (pain on back) Remove & Discard patch within 12 hours or as directed by MD (Patient not taking: Reported on 11/30/2023), Disp: 30 patch, Rfl: 1    nystatin (MYCOSTATIN) cream, Apply topically 2 (two) times a day for 14 days, Disp: 30 g, Rfl: 0    Current Facility-Administered Medications:     sodium chloride 0.9 % infusion, 125 mL/hr, Intravenous, Continuous, Rex Gordillo DO, Last Rate: 125 mL/hr at 01/23/24 0836, 125 mL/hr at 01/23/24 0836    Past Medical History:   Diagnosis Date    Anxiety     Chronic pain     back pain     Depression 1/23/2024    Diabetes mellitus (HCC)     Disease of thyroid gland 1/23/2024    hypothyroid     Hyperlipidemia     Hypertension     Psychiatric disorder 1/23/2024       Past Surgical History:   Procedure Laterality Date    DENTAL SURGERY      HYSTERECTOMY      over 20 years ago       Family History   Problem Relation Age of Onset    Dementia Mother     Cancer Maternal Aunt     Cancer Maternal Uncle         reports that she quit smoking about 8 months ago. Her smoking use included cigarettes. She started smoking about 40 years ago. She has a 20.0 pack-year smoking history. She has been exposed to tobacco smoke. She has never used smokeless tobacco. She reports current alcohol use. She reports that she does not use drugs.    Vitals:    01/23/24 0820   BP: 155/97   Pulse: 61   Resp: 16   Temp: (!) 97.4 °F (36.3 °C)   SpO2: 98%     "    PHYSICAL EXAM  General Appearance:    Alert, cooperative, no distress,    Head:    Normocephalic without obvious abnormality   Eyes:    PERRL, conjunctiva/corneas clear     Neck:   Supple, no adenopathy, no JVD   Back:     Symmetric, no spinal or CVA tenderness   Lungs:     Clear to auscultation bilaterally, no wheezing or rhonchi   Heart:    Regular rate and rhythm, S1 and S2 normal, no murmur   Abdomen:     Benign, no rebound or guarding.    Extremities:   Extremities normal. No clubbing, cyanosis or edema   Psych:   Normal Affect, AOx3.    Neurologic:  Skin:   CNII-XII intact. Strength symmetric, speech intact    Warm, dry, intact, no visible rashes or lesions except as follows: Soft, mobile, oval mass 3 x 4\" right midback near axilla.                    Jade Ortega MD    Date: 1/23/2024 Time: 8:38 AM     "

## 2024-01-23 NOTE — ANESTHESIA POSTPROCEDURE EVALUATION
"Post-Op Assessment Note    CV Status:  Stable    Pain management: adequate       Mental Status:  Alert and awake   Hydration Status:  Euvolemic   PONV Controlled:  Controlled   Airway Patency:  Patent     Post Op Vitals Reviewed: Yes    No anethesia notable event occurred.    Staff: Anesthesiologist               /71 (01/23/24 0938)    Temp      Pulse 82 (01/23/24 0938)   Resp 16 (01/23/24 0938)    SpO2 97 % (01/23/24 0938)    /71   Pulse 82   Temp (!) 97.4 °F (36.3 °C) (Temporal)   Resp 16   Ht 5' 3\" (1.6 m)   Wt 79.8 kg (176 lb)   SpO2 97%   BMI 31.18 kg/m²     "

## 2024-01-23 NOTE — ANESTHESIA PREPROCEDURE EVALUATION
Procedure:  COLONOSCOPY    Relevant Problems   CARDIO   (+) Essential hypertension   (+) Mixed hyperlipidemia      MUSCULOSKELETAL   (+) Lumbar back pain      NEURO/PSYCH   (+) Anxiety   (+) Depression      Other   (+) Class 1 obesity due to excess calories with serious comorbidity and body mass index (BMI) of 31.0 to 31.9 in adult   (+) Memory deficit   (+) Syncope   (+) Tobacco dependency        Physical Exam    Airway    Mallampati score: II  TM Distance: >3 FB  Neck ROM: full     Dental   No notable dental hx     Cardiovascular  Rhythm: regular, Rate: normal, Cardiovascular exam normal    Pulmonary  Pulmonary exam normal Breath sounds clear to auscultation    Other Findings  post-pubertal.      Anesthesia Plan  ASA Score- 2     Anesthesia Type- IV sedation with anesthesia with ASA Monitors.         Additional Monitors:     Airway Plan:     Comment: GA prn.       Plan Factors-    Chart reviewed.    Patient summary reviewed.    Patient is a current smoker.  Patient instructed to abstain from smoking on day of procedure.     There is medical exclusion for perioperative obstructive sleep apnea risk education.        Induction- intravenous.    Postoperative Plan-     Informed Consent- Anesthetic plan and risks discussed with patient.  I personally reviewed this patient with the CRNA. Discussed and agreed on the Anesthesia Plan with the CRNA..

## 2024-01-24 ENCOUNTER — TELEPHONE (OUTPATIENT)
Dept: SURGERY | Facility: CLINIC | Age: 64
End: 2024-01-24

## 2024-01-24 ENCOUNTER — ANESTHESIA EVENT (OUTPATIENT)
Dept: PERIOP | Facility: HOSPITAL | Age: 64
End: 2024-01-24
Payer: COMMERCIAL

## 2024-01-24 NOTE — TELEPHONE ENCOUNTER
Colonoscopy follow up call:    Left message for patient to return call to check on how she is feeling.

## 2024-01-26 ENCOUNTER — HOSPITAL ENCOUNTER (OUTPATIENT)
Facility: HOSPITAL | Age: 64
Setting detail: OUTPATIENT SURGERY
Discharge: HOME | End: 2024-01-26
Attending: SURGERY | Admitting: SURGERY
Payer: COMMERCIAL

## 2024-01-26 ENCOUNTER — ANESTHESIA (OUTPATIENT)
Dept: PERIOP | Facility: HOSPITAL | Age: 64
End: 2024-01-26
Payer: COMMERCIAL

## 2024-01-26 VITALS
BODY MASS INDEX: 31.21 KG/M2 | TEMPERATURE: 97.7 F | HEART RATE: 54 BPM | OXYGEN SATURATION: 97 % | WEIGHT: 176.15 LBS | RESPIRATION RATE: 16 BRPM | DIASTOLIC BLOOD PRESSURE: 83 MMHG | SYSTOLIC BLOOD PRESSURE: 141 MMHG | HEIGHT: 63 IN

## 2024-01-26 DIAGNOSIS — B37.2 CUTANEOUS CANDIDIASIS: ICD-10-CM

## 2024-01-26 DIAGNOSIS — D17.1 LIPOMA OF BACK: Primary | ICD-10-CM

## 2024-01-26 PROBLEM — J44.9 COPD (CHRONIC OBSTRUCTIVE PULMONARY DISEASE) (HCC): Status: ACTIVE | Noted: 2024-01-26

## 2024-01-26 LAB
GLUCOSE SERPL-MCNC: 88 MG/DL (ref 65–140)
GLUCOSE SERPL-MCNC: 93 MG/DL (ref 65–140)

## 2024-01-26 PROCEDURE — 88304 TISSUE EXAM BY PATHOLOGIST: CPT | Performed by: PATHOLOGY

## 2024-01-26 PROCEDURE — 82948 REAGENT STRIP/BLOOD GLUCOSE: CPT

## 2024-01-26 PROCEDURE — 21931 EXC BACK LES SC 3 CM/>: CPT | Performed by: SURGERY

## 2024-01-26 RX ORDER — DEXTROSE AND SODIUM CHLORIDE 5; .45 G/100ML; G/100ML
80 INJECTION, SOLUTION INTRAVENOUS CONTINUOUS
Status: DISCONTINUED | OUTPATIENT
Start: 2024-01-26 | End: 2024-01-26 | Stop reason: HOSPADM

## 2024-01-26 RX ORDER — PROPOFOL 10 MG/ML
INJECTION, EMULSION INTRAVENOUS AS NEEDED
Status: DISCONTINUED | OUTPATIENT
Start: 2024-01-26 | End: 2024-01-26

## 2024-01-26 RX ORDER — HYDRALAZINE HYDROCHLORIDE 20 MG/ML
INJECTION INTRAMUSCULAR; INTRAVENOUS AS NEEDED
Status: DISCONTINUED | OUTPATIENT
Start: 2024-01-26 | End: 2024-01-26

## 2024-01-26 RX ORDER — HYDROMORPHONE HCL/PF 1 MG/ML
0.5 SYRINGE (ML) INJECTION
Status: DISCONTINUED | OUTPATIENT
Start: 2024-01-26 | End: 2024-01-26 | Stop reason: HOSPADM

## 2024-01-26 RX ORDER — HYDROCODONE BITARTRATE AND ACETAMINOPHEN 5; 325 MG/1; MG/1
1 TABLET ORAL EVERY 6 HOURS PRN
Qty: 5 TABLET | Refills: 0 | Status: SHIPPED | OUTPATIENT
Start: 2024-01-26 | End: 2024-02-05 | Stop reason: SDUPTHER

## 2024-01-26 RX ORDER — FENTANYL CITRATE 50 UG/ML
INJECTION, SOLUTION INTRAMUSCULAR; INTRAVENOUS AS NEEDED
Status: DISCONTINUED | OUTPATIENT
Start: 2024-01-26 | End: 2024-01-26

## 2024-01-26 RX ORDER — MIDAZOLAM HYDROCHLORIDE 2 MG/2ML
INJECTION, SOLUTION INTRAMUSCULAR; INTRAVENOUS AS NEEDED
Status: DISCONTINUED | OUTPATIENT
Start: 2024-01-26 | End: 2024-01-26

## 2024-01-26 RX ORDER — CEFAZOLIN SODIUM 1 G/50ML
SOLUTION INTRAVENOUS AS NEEDED
Status: DISCONTINUED | OUTPATIENT
Start: 2024-01-26 | End: 2024-01-26

## 2024-01-26 RX ORDER — ONDANSETRON 2 MG/ML
4 INJECTION INTRAMUSCULAR; INTRAVENOUS ONCE AS NEEDED
Status: DISCONTINUED | OUTPATIENT
Start: 2024-01-26 | End: 2024-01-26 | Stop reason: HOSPADM

## 2024-01-26 RX ORDER — SODIUM CHLORIDE, SODIUM LACTATE, POTASSIUM CHLORIDE, CALCIUM CHLORIDE 600; 310; 30; 20 MG/100ML; MG/100ML; MG/100ML; MG/100ML
125 INJECTION, SOLUTION INTRAVENOUS CONTINUOUS
Status: DISCONTINUED | OUTPATIENT
Start: 2024-01-26 | End: 2024-01-26 | Stop reason: HOSPADM

## 2024-01-26 RX ORDER — MEPERIDINE HYDROCHLORIDE 25 MG/ML
12.5 INJECTION INTRAMUSCULAR; INTRAVENOUS; SUBCUTANEOUS
Status: DISCONTINUED | OUTPATIENT
Start: 2024-01-26 | End: 2024-01-26 | Stop reason: HOSPADM

## 2024-01-26 RX ORDER — LIDOCAINE HCL/PF 100 MG/5ML
SYRINGE (ML) INJECTION AS NEEDED
Status: DISCONTINUED | OUTPATIENT
Start: 2024-01-26 | End: 2024-01-26

## 2024-01-26 RX ORDER — ONDANSETRON 2 MG/ML
INJECTION INTRAMUSCULAR; INTRAVENOUS AS NEEDED
Status: DISCONTINUED | OUTPATIENT
Start: 2024-01-26 | End: 2024-01-26

## 2024-01-26 RX ORDER — FENTANYL CITRATE/PF 50 MCG/ML
25 SYRINGE (ML) INJECTION
Status: DISCONTINUED | OUTPATIENT
Start: 2024-01-26 | End: 2024-01-26 | Stop reason: HOSPADM

## 2024-01-26 RX ORDER — NYSTATIN AND TRIAMCINOLONE ACETONIDE 100000; 1 [USP'U]/G; MG/G
OINTMENT TOPICAL 2 TIMES DAILY
Qty: 30 G | Refills: 0 | Status: SHIPPED | OUTPATIENT
Start: 2024-01-26

## 2024-01-26 RX ORDER — ACETAMINOPHEN 325 MG/1
650 TABLET ORAL EVERY 6 HOURS PRN
Status: DISCONTINUED | OUTPATIENT
Start: 2024-01-26 | End: 2024-01-26 | Stop reason: HOSPADM

## 2024-01-26 RX ADMIN — FENTANYL CITRATE 50 MCG: 50 INJECTION INTRAMUSCULAR; INTRAVENOUS at 08:11

## 2024-01-26 RX ADMIN — LIDOCAINE HYDROCHLORIDE 50 MG: 20 INJECTION INTRAVENOUS at 07:36

## 2024-01-26 RX ADMIN — CEFAZOLIN SODIUM 1000 MG: 1 SOLUTION INTRAVENOUS at 07:38

## 2024-01-26 RX ADMIN — ONDANSETRON 4 MG: 2 INJECTION INTRAMUSCULAR; INTRAVENOUS at 07:50

## 2024-01-26 RX ADMIN — PROPOFOL 200 MG: 10 INJECTION, EMULSION INTRAVENOUS at 07:36

## 2024-01-26 RX ADMIN — FENTANYL CITRATE 50 MCG: 50 INJECTION INTRAMUSCULAR; INTRAVENOUS at 07:53

## 2024-01-26 RX ADMIN — PROPOFOL 50 MG: 10 INJECTION, EMULSION INTRAVENOUS at 07:37

## 2024-01-26 RX ADMIN — HYDRALAZINE HYDROCHLORIDE 5 MG: 20 INJECTION, SOLUTION INTRAMUSCULAR; INTRAVENOUS at 07:58

## 2024-01-26 RX ADMIN — MEPERIDINE HYDROCHLORIDE 12.5 MG: 25 INJECTION INTRAMUSCULAR; INTRAVENOUS; SUBCUTANEOUS at 09:20

## 2024-01-26 RX ADMIN — FENTANYL CITRATE 50 MCG: 50 INJECTION INTRAMUSCULAR; INTRAVENOUS at 07:36

## 2024-01-26 RX ADMIN — ACETAMINOPHEN 325MG 650 MG: 325 TABLET ORAL at 10:27

## 2024-01-26 RX ADMIN — FENTANYL CITRATE 50 MCG: 50 INJECTION INTRAMUSCULAR; INTRAVENOUS at 08:27

## 2024-01-26 RX ADMIN — SODIUM CHLORIDE, SODIUM LACTATE, POTASSIUM CHLORIDE, AND CALCIUM CHLORIDE 125 ML/HR: .6; .31; .03; .02 INJECTION, SOLUTION INTRAVENOUS at 06:35

## 2024-01-26 RX ADMIN — MIDAZOLAM 2 MG: 1 INJECTION INTRAMUSCULAR; INTRAVENOUS at 07:25

## 2024-01-26 NOTE — ANESTHESIA POSTPROCEDURE EVALUATION
"Post-Op Assessment Note    CV Status:  Stable    Pain management: adequate       Mental Status:  Alert and awake   Hydration Status:  Euvolemic   PONV Controlled:  Controlled   Airway Patency:  Patent     Post Op Vitals Reviewed: Yes    No anethesia notable event occurred.    Staff: Anesthesiologist               /80 (01/26/24 0940)    Temp (!) 97.1 °F (36.2 °C) (01/26/24 0940)    Pulse 56 (01/26/24 0940)   Resp 13 (01/26/24 0940)    SpO2 97 % (01/26/24 0940)    /80   Pulse 56   Temp (!) 97.1 °F (36.2 °C)   Resp 13   Ht 5' 3\" (1.6 m)   Wt 79.9 kg (176 lb 2.4 oz)   SpO2 97%   BMI 31.20 kg/m²     "

## 2024-01-26 NOTE — DISCHARGE INSTR - AVS FIRST PAGE
[facility]  Post-Operative Care Instructions  Dr. Jade Farah MD, FACS    1. General: You will feel pulling sensations around the wound or funny aches and pains around the incisions. This is normal. Even minor surgery is a change in your body and this is your body’s way of reaction to it. If you have had abdominal surgery, it may help to support the incision with a small pillow or blanket for comfort when moving or coughing.    If you have had laparoscopic surgery or robotic surgery, you may have right shoulder pain after surgery.  This is common and not unexpected. This is due to a muscle spasm of the trapezius muscle.  As with any muscle spasm, heating pad is very useful. Please apply a heating pad liberally and carefully to the right shoulder muscle, and this will relieve much of your discomfort.    2. Wound care: Make sure to remove the bandage in about 24 hours, unless instructed otherwise. You usually don't have to redress the wound after 24-48 hours, unless for comfort. Keep the incision clean and dry. Let air get to it. If this Steri-Strips fall off, just keep the wound clean.  If you have a black bandage, generally this is kept on for 2 to 3 days.  It is waterproof and you may shower over it.    3. Water: You may shower over the wound, unless there are drain tubes left in place. Do not bathe or use a pool or hot tub until cleared by the physician. You may shower right over the staples or Steri-Strips and packing dry when you are done.    4. Activity: You may go up and down stairs, walk as much as you are comfortable, but walk at least 3 times each day. If you have had abdominal surgery, do not lift anything heavier than 15 pounds for at least 2-4 weeks, unless cleared by the doctor.    5. Diet: You may resume a regular diet. If you had a same-day surgery or overnight stay surgery, you may wish to eat lightly for a few days: soups, crackers, and sandwiches. You may resume a regular diet  when ready.    6. Medications: Resume all of your previous medications, unless told otherwise by the doctor.  You may take aspirin or ibuprofen (Advil, Motrin, etc.) for pain after the surgery, unless directed otherwise.  Tylenol is always fine, unless you are taking any narcotic pain medication containing Tylenol (such as Percocet, Darvocet, Vicodin, or anything containing acetaminophen). Do not take Tylenol if you're taking these medications. You do not need to take the narcotic pain medications unless you are having significant pain.     7. Driving: You will need someone to drive you home on the day of surgery. Do not drive or make any important decisions while on narcotic pain medication or 24 hours and after anesthesia or sedation for surgery.Generally, you may drive when your off all narcotics.    8. Upset Stomach: You may take Maalox, Tums, or similar items for an upset stomach. If your narcotic pain medication causes an upset stomach, do not take it on an empty stomach. Try taking it with at least some crackers or toast.     9. Constipation: Patients often experienced constipation after surgery. You may take over-the-counter medication for this, such as Metamucil, Senokot, Dulcolax, milk of magnesia, etc. You may take a suppository unless you have had anorectal surgery such as a procedure on your hemorrhoids. If you experience significant nausea or vomiting after abdominal surgery, call the office before trying any of these medications.    10. Call the office: If you are experiencing any of the following, fevers above 101.5°, significant nausea or vomiting, if the wound develops drainage and/or is excessive redness around the wound, or if you have significant diarrhea or other worsening symptoms.    11. Pain: You may be given a prescription for pain. This will be given to the hospital, the day of surgery.    12. Sexual Activity: You may resume sexual activity when you feel ready and comfortable and your  incision is sealed and healed without apparent infection risk.    13. Urination: If you haven't urinated in 6 hours, go directly to the ER for evaluation for urinary retention.     [facility]  [address], [address]  [place] [address]  Phone: [phone]

## 2024-01-26 NOTE — INTERVAL H&P NOTE
H&P reviewed. After examining the patient I find no changes in the patients condition since the H&P had been written.    Vitals:    01/26/24 0612   BP: (!) 187/103   Pulse: 60   Resp: 16   Temp: 97.8 °F (36.6 °C)   SpO2: 96%

## 2024-01-26 NOTE — ANESTHESIA PREPROCEDURE EVALUATION
Procedure:  UPPER BACK EXCISION  BX LESION/MASS (Right: Back)    Relevant Problems   ANESTHESIA (within normal limits)      CARDIO   (+) Essential hypertension   (+) Mixed hyperlipidemia      ENDO (within normal limits)      GI/HEPATIC (within normal limits)      /RENAL (within normal limits)      GYN (within normal limits)      HEMATOLOGY (within normal limits)      MUSCULOSKELETAL   (+) Lumbar back pain      NEURO/PSYCH   (+) Anxiety   (+) Depression      PULMONARY   (+) COPD (chronic obstructive pulmonary disease) (HCC)      Endocrine   (+) Diabetes mellitus (HCC)        Physical Exam    Airway    Mallampati score: II         Dental       Cardiovascular  Rhythm: regular    Pulmonary   Breath sounds clear to auscultation, Decreased breath sounds    Other Findings  post-pubertal.      Anesthesia Plan  ASA Score- 3     Anesthesia Type- general with ASA Monitors.         Additional Monitors:     Airway Plan: ETT and LMA.           Plan Factors-Exercise tolerance (METS): >4 METS.    Chart reviewed. EKG reviewed.  Existing labs reviewed. Patient summary reviewed.    Patient is not a current smoker.              Induction- intravenous.    Postoperative Plan- Plan for postoperative opioid use.     Informed Consent- Anesthetic plan and risks discussed with patient.

## 2024-01-26 NOTE — INTERVAL H&P NOTE
H&P reviewed. After examining the patient I find no changes in the patients condition since the H&P had been written.    Patient did exhibit some confusion this morning regarding her breast. What she was discussing was that she has infra mammary sweating and a very mild rash. I do not believe this precludes proceeding with surgery. We will prescribe some nystatin cream and gave her lifestyle modifications to improve the sweating under the breast tissue. No surgery is to the breast today and she understands that and is able to verbalize that. She understands that we are removing the lipoma of the right flank and she verbalize this clearly to me in the pre-upholding area as we marked the lesion/lipoma.     Full  consent was reissued  again today personally by me.     MPM

## 2024-01-26 NOTE — OP NOTE
UPPER BACK EXCISION  BX LESION/MASS  Postoperative Note  PATIENT NAME: Latoya Lewis  : 1960  MRN: [id number]  [facility] ROOM 06      Consent:  The risks, benefits, and alternatives to the surgery were discussed with the patient and with the family prior to surgery, personally by Dr. Farah.  If the consent was obtained by the physician assistant or other representative, the consent was reviewed once again personally by the operating physician.  Common complications particular for this procedure as well as unusual complications were discussed, including but not limited to:  bleeding, wound infection, prolonged wound healing, open wounds, reoperation and/or recurrence of the lesion.      A  was used if necessary.  The patient expressed understanding of the issues discussed and wished and consented to the procedure to proceed.  All questions were answered.  Dr. Farah personally discussed the informed consent with this patient.      Surgery Date: 2024    Pre operative diagnosis:   Lipoma of back [D17.1]    Operative Indications:  Soft tissue mass    Operative Findings:    Final Size of the lesion is estimated at: 16.5 x8x4.5 cm , inclusive of margins.        Post operative diagnosis :and findings  Post-Op Diagnosis Codes:     * Lipoma of back [D17.1]    Procedure:   Procedure(s):  UPPER BACK EXCISION  BX LESION/MASS    Surgeons and Role:     * Jade Farah MD - Primary  The assistant was medically necessary for surgical safety the case including suturing, retraction, and hemostasis. A qualified resident was not available.  I provided direct and immediate supervision.  I was present for the entire procedure.     Drains:  * No LDAs found *    Specimens:  ID Type Source Tests Collected by Time Destination   1 : Lipoma Right Flank Tissue Soft Tissue, Lipoma TISSUE EXAM Jade Farah MD 2024 [id number]        Estimated Blood Loss:   10 mL    Anesthesia Type:   Choice     Procedure:  The patient  was seen in the Holding Room. The risks, benefits, complications, treatment options, and expected outcomes were discussed with the patient. The possibilities of reaction to medication, bleeding, infection, the need for additional procedures, failure to diagnose a condition, and creating a complication operation were discussed with the patient. The patient concurred with the proposed plan, giving informed consent.  The site of surgery properly noted/marked. The patient was taken to Operating Room, identified as Latoya Lewis and staff verified the patient name, , site, and laterality, if applicable.   A Time Out was held and the above information confirmed.    The patient was placed lateral - right side up.     The right axilla and chest was prepped and draped in standard fashion.      Local anesthesia was used to anesthetize the skin surrounding the lesion.      A oblique elliptical incision was made over the lesion.  Sharp and blunt dissection were used to mobilize the mass which was in a subcutaneous location.      Hemostasis was achieved with cautery.     Scissors, knife, and cautery were used in the excision so that 4mm  margins were taken around the lesion.      Tissue removed: without necrosis, devitalization, and non viable tissue     Type Tissue removed: skin, subcutaneous tissue, fat, and cyst /mass    Closure was achieved a with layered closure utilizing a 3-0 Vicryl subcutaneous layer and a  4-0 Monocryl subcuticular stitch.     Steristrips  and Histoacryl  applied and the wound dressed.    Sponge count and needle count and instrument count were correct x2, and RFA wanding for sponges was also negative at the end of the procedure prior to closure.  .    Skin and soft tissue/subcutaneous tissue and fat were removed along with the mass.       Some portions of this records may have been generated with voice recognition software. There may be translation, syntax,  or grammatical errors. Occasional wrong  "word or \"sound-a-like\" substitutions may have occurred due to the inherent limitations of the voice recognition software. Read the chart carefully and recognize, using context, where substations may have occurred. If you have any questions, please contact the dictating provider for clarification or correction, as needed.       Complications: None    Condition: Stable to PACU    SIGNATURE: Jade Ortega MD   DATE: January 26, 2024   TIME: 8:26 AM    "

## 2024-01-30 ENCOUNTER — TELEPHONE (OUTPATIENT)
Dept: SURGERY | Facility: CLINIC | Age: 64
End: 2024-01-30

## 2024-01-30 PROCEDURE — 88344 IMHCHEM/IMCYTCHM EA MLT ANTB: CPT | Performed by: PATHOLOGY

## 2024-01-30 PROCEDURE — 88305 TISSUE EXAM BY PATHOLOGIST: CPT | Performed by: PATHOLOGY

## 2024-01-30 NOTE — RESULT ENCOUNTER NOTE
Please call pt with abnormal results and schedule follow up.    Please advise the patient that we recommended a 1 to 2-year follow-up, due to the number of sessile serrated colon polyps, more likely 1 year follow-up.

## 2024-01-30 NOTE — TELEPHONE ENCOUNTER
Spoke with patient and advised her of her Pathology results. Also that she would be scheduled for a repeat colonoscopy in 1-2 years,

## 2024-01-30 NOTE — TELEPHONE ENCOUNTER
----- Message from Jade Farah MD sent at 1/30/2024 11:25 AM EST -----  Please call pt with abnormal results and schedule follow up.    Please advise the patient that we recommended a 1 to 2-year follow-up, due to the number of sessile serrated colon polyps, more likely 1 year follow-up.

## 2024-01-30 NOTE — TELEPHONE ENCOUNTER
Left message for patient to return call to discuss results of Colonoscopy pathology results.   Advised patient we can also discuss at her upcoming appointment 2/12/24 if she would like.

## 2024-01-31 PROCEDURE — 88304 TISSUE EXAM BY PATHOLOGIST: CPT | Performed by: PATHOLOGY

## 2024-02-01 ENCOUNTER — TELEPHONE (OUTPATIENT)
Dept: SURGERY | Facility: CLINIC | Age: 64
End: 2024-02-01

## 2024-02-01 NOTE — TELEPHONE ENCOUNTER
----- Message from Brooklyn Phillips PA-C sent at 2/1/2024  7:50 AM EST -----  Please let her know her path was a normal lipoma   Previous Labs: No How Did The Hair Loss Occur?: gradual in onset How Severe Is Your Hair Loss?: severe What Hair Products Do You Use?: Purology Additional History: Hair loss gradually started after the COVID shot. Onset 2 years ago. Dr Oneil gave her ketoconazole but hasn’t used it yet, she is using fluocinonide.

## 2024-02-05 ENCOUNTER — TELEPHONE (OUTPATIENT)
Age: 64
End: 2024-02-05

## 2024-02-05 ENCOUNTER — NURSE TRIAGE (OUTPATIENT)
Age: 64
End: 2024-02-05

## 2024-02-05 DIAGNOSIS — D17.1 LIPOMA OF BACK: ICD-10-CM

## 2024-02-05 RX ORDER — HYDROCODONE BITARTRATE AND ACETAMINOPHEN 5; 325 MG/1; MG/1
1 TABLET ORAL EVERY 6 HOURS PRN
Qty: 5 TABLET | Refills: 0 | Status: SHIPPED | OUTPATIENT
Start: 2024-02-05 | End: 2024-02-12 | Stop reason: ALTCHOICE

## 2024-02-05 NOTE — TELEPHONE ENCOUNTER
"Patient of . Surgery 1/26/24.     called for wife to state she is still in a lot of pain 10/10 and is requesting more pain medication. Had excision of lipoma of back. Mild swelling. No redness. No drainage. No fever or chills presently .  Answer Assessment - Initial Assessment Questions  1. SYMPTOM: \"What's the main symptom you're concerned about?\" (e.g., redness, pain, drainage)      Pain  2. ONSET: \"When did pain  tart?\"      1/26/24  3. SURGERY: \"What surgery was performed?\"      Excision of lipoma  4. DATE of SURGERY: \"When was surgery performed?\"       1/26/24  5. INCISION SITE: \"Where is the incision located?\"       Back  6. REDNESS: \"Is there any redness at the incision site?\" If yes, ask: \"How wide across is the redness?\" (Inches, centimeters)       No  7. PAIN: \"Is there any pain?\" If Yes, ask: \"How bad is it?\"  (Scale 1-10; or mild, moderate, severe)      Pain is a 10/10  8. BLEEDING: \"Is there any bleeding?\" If Yes, ask: \"How much?\" and \"Where?\"      No  9. DRAINAGE: \"Is there any drainage from the incision site?\" If yes, ask: \"What color and how much?\" (e.g., red, cloudy, pus; drops, teaspoon)      No  10. FEVER: \"Do you have a fever?\" If Yes, ask: \"What is your temperature, how was it measured, and when did it start?\"        No  11. OTHER SYMPTOMS: \"Do you have any other symptoms?\" (e.g., shaking chills, weakness, rash elsewhere on body)        No    Protocols used: Post-Op Incision Symptoms and Questions-ADULT-[place]    "

## 2024-02-12 ENCOUNTER — OFFICE VISIT (OUTPATIENT)
Dept: SURGERY | Facility: CLINIC | Age: 64
End: 2024-02-12

## 2024-02-12 VITALS
SYSTOLIC BLOOD PRESSURE: 142 MMHG | RESPIRATION RATE: 16 BRPM | TEMPERATURE: 96.5 F | WEIGHT: 177 LBS | OXYGEN SATURATION: 96 % | DIASTOLIC BLOOD PRESSURE: 84 MMHG | HEART RATE: 51 BPM | BODY MASS INDEX: 31.36 KG/M2 | HEIGHT: 63 IN

## 2024-02-12 DIAGNOSIS — Z09 POSTOP CHECK: Primary | ICD-10-CM

## 2024-02-12 PROCEDURE — 99024 POSTOP FOLLOW-UP VISIT: CPT | Performed by: PHYSICIAN ASSISTANT

## 2024-02-12 NOTE — PROGRESS NOTES
Assessment/Plan:   Latoya Lewis is a 63 y.o.female who comes in today for postoperative check after lipoma removal with  Dr. Farah on 1/26/24.    Pathology: Reviewed with patient, all questions answered.   Final Diagnosis   A. Soft Tissue, Right Flank:  - Mature adipose tissue with focal fat necrosis, compatible with lipoma.  - Overlying skin is unremarkable     Patient needs a 1 year colonoscopy. She is aware.    Postoperative restrictions reviewed. All questions answered.       ______________________________________________________  HPI:  Latoya Lewis is a 63 y.o.female who comes in today for postoperative check after recent lipoma removal with  Dr. Farah on 1/26/24.    Currently doing well without problems, no fever or chills,no nausea and no vomiting.    Reports no issues.    ROS:  General ROS: negative for - chills, fatigue, fever or night sweats, weight loss  Respiratory ROS: no cough, shortness of breath, or wheezing  Cardiovascular ROS: no chest pain or dyspnea on exertion  Genito-Urinary ROS: no dysuria, trouble voiding, or hematuria  Musculoskeletal ROS: negative for - gait disturbance, joint pain or muscle pain  Neurological ROS: no TIA or stroke symptoms  GI ROS: see HPI  Skin ROS: no new rashes or lesions   Lymphatic ROS: no new adenopathy noted by pt.   GYN ROS: see HPI, no new GYN history or bleeding noted  Psy ROS: no new mental or behavioral disturbances         Patient Active Problem List   Diagnosis    Syncope    At risk for polypharmacy    Bradycardia    Hypotension    Essential hypertension    Mixed hyperlipidemia    Diabetes mellitus (HCC)    Anxiety    Lumbar back pain    History of thyroid disorder    Mass on back    Neck pain    Tobacco dependency    Insertional Achilles tendinopathy    Memory deficit    Class 1 obesity due to excess calories with serious comorbidity and body mass index (BMI) of 31.0 to 31.9 in adult    Blurred vision, bilateral    Rash and nonspecific skin eruption     Immunization declined    Psychiatric disorder    Depression    Disease of thyroid gland    COPD (chronic obstructive pulmonary disease) (HCC)       Allergies:  Aspirin and Duloxetine      Current Outpatient Medications:     acetaminophen (TYLENOL) 650 mg CR tablet, Take 1 tablet (650 mg total) by mouth every 8 (eight) hours as needed for mild pain, Disp: 30 tablet, Rfl: 0    amLODIPine (NORVASC) 10 mg tablet, Take 1 tablet (10 mg total) by mouth daily, Disp: 30 tablet, Rfl: 2    cyclobenzaprine (FLEXERIL) 10 mg tablet, Take 1 tablet (10 mg total) by mouth 2 (two) times a day as needed for muscle spasms, Disp: 20 tablet, Rfl: 0    hydrOXYzine HCL (ATARAX) 25 mg tablet, Take 1 tablet (25 mg total) by mouth every 6 (six) hours, Disp: 12 tablet, Rfl: 0    lidocaine (Lidoderm) 5 %, Apply 1 patch topically over 12 hours daily Remove & Discard patch within 12 hours or as directed by MD, Disp: 30 patch, Rfl: 0    nystatin-triamcinolone (MYCOLOG-II) ointment, Apply topically 2 (two) times a day Apply to under breasts as needed for rash. 1-2x per day, Disp: 30 g, Rfl: 0    rosuvastatin (CRESTOR) 10 MG tablet, Take 1 tablet (10 mg total) by mouth daily, Disp: 30 tablet, Rfl: 2    Past Medical History:   Diagnosis Date    Anxiety     Chronic pain     back pain     Depression 1/23/2024    Diabetes mellitus (HCC)     Disease of thyroid gland 1/23/2024    hypothyroid     Hyperlipidemia     Hypertension     Psychiatric disorder 1/23/2024       Past Surgical History:   Procedure Laterality Date    DENTAL SURGERY      HYSTERECTOMY      over 20 years ago    [place] EXCISION TUMOR SOFT TIS BACK/FLANK SUBQ 3 CM/> Right 1/26/2024    Procedure: UPPER BACK EXCISION  BX LESION/MASS;  Surgeon: Jade Farah MD;  Location: [facility];  Service: General       Family History   Problem Relation Age of Onset    Dementia Mother     Cancer Maternal Aunt     Cancer Maternal Uncle         reports that she quit smoking about 9 months ago. Her smoking  use included cigarettes. She started smoking about 40 years ago. She has a 20.0 pack-year smoking history. She has been exposed to tobacco smoke. She has never used smokeless tobacco. She reports current alcohol use. She reports that she does not use drugs.    Vitals:    02/12/24 1320   BP: 142/84   Pulse: (!) 51   Resp: 16   Temp: (!) 96.5 °F (35.8 °C)   SpO2: 96%       PHYSICAL EXAM  General: normal, cooperative, no distress  Incision: clean, dry, and intact and healing well        Brooklyn Phillips PA-C    Date: 2/12/2024 Time: 1:25 PM

## 2024-06-03 PROBLEM — K63.5 SERRATED POLYP OF COLON: Status: ACTIVE | Noted: 2024-06-03

## 2024-06-20 ENCOUNTER — VBI (OUTPATIENT)
Dept: ADMINISTRATIVE | Facility: OTHER | Age: 64
End: 2024-06-20

## 2024-06-20 NOTE — TELEPHONE ENCOUNTER
06/20/24 8:36 AM     Chart reviewed for Hemoglobin A1c ; nothing is submitted to the patient's insurance at this time.     Janki Bernstein MA   [facility]

## 2024-08-17 ENCOUNTER — HOSPITAL ENCOUNTER (EMERGENCY)
Facility: HOSPITAL | Age: 64
Discharge: HOME | End: 2024-08-18
Attending: EMERGENCY MEDICINE
Payer: COMMERCIAL

## 2024-08-17 DIAGNOSIS — I10 HYPERTENSION: Primary | ICD-10-CM

## 2024-08-17 DIAGNOSIS — R07.9 CHEST PAIN: ICD-10-CM

## 2024-08-17 LAB
ALBUMIN SERPL BCG-MCNC: 4.6 G/DL (ref 3.5–5)
ALP SERPL-CCNC: 52 U/L (ref 34–104)
ALT SERPL W P-5'-P-CCNC: 9 U/L (ref 7–52)
ANION GAP SERPL CALCULATED.3IONS-SCNC: 8 MMOL/L (ref 4–13)
AST SERPL W P-5'-P-CCNC: 22 U/L (ref 13–39)
ATRIAL RATE: 55 BPM
BASOPHILS # BLD AUTO: 0.02 THOUSANDS/ÂΜL (ref 0–0.1)
BASOPHILS NFR BLD AUTO: 0 % (ref 0–1)
BILIRUB SERPL-MCNC: 0.32 MG/DL (ref 0.2–1)
BNP SERPL-MCNC: 27 PG/ML (ref 0–100)
BUN SERPL-MCNC: 11 MG/DL (ref 5–25)
CALCIUM SERPL-MCNC: 9.8 MG/DL (ref 8.4–10.2)
CARDIAC TROPONIN I PNL SERPL HS: 3 NG/L
CHLORIDE SERPL-SCNC: 107 MMOL/L (ref 96–108)
CO2 SERPL-SCNC: 24 MMOL/L (ref 21–32)
CREAT SERPL-MCNC: 0.99 MG/DL (ref 0.6–1.3)
D DIMER PPP FEU-MCNC: 0.55 UG/ML FEU
EOSINOPHIL # BLD AUTO: 0.04 THOUSAND/ÂΜL (ref 0–0.61)
EOSINOPHIL NFR BLD AUTO: 1 % (ref 0–6)
ERYTHROCYTE [DISTWIDTH] IN BLOOD BY AUTOMATED COUNT: 15.7 % (ref 11.6–15.1)
GFR SERPL CREATININE-BSD FRML MDRD: 60 ML/MIN/1.73SQ M
GLUCOSE SERPL-MCNC: 103 MG/DL (ref 65–140)
GLUCOSE SERPL-MCNC: 98 MG/DL (ref 65–140)
HCT VFR BLD AUTO: 38.9 % (ref 34.8–46.1)
HGB BLD-MCNC: 13.1 G/DL (ref 11.5–15.4)
IMM GRANULOCYTES # BLD AUTO: 0.04 THOUSAND/UL (ref 0–0.2)
IMM GRANULOCYTES NFR BLD AUTO: 1 % (ref 0–2)
LYMPHOCYTES # BLD AUTO: 2.11 THOUSANDS/ÂΜL (ref 0.6–4.47)
LYMPHOCYTES NFR BLD AUTO: 34 % (ref 14–44)
MCH RBC QN AUTO: 29.8 PG (ref 26.8–34.3)
MCHC RBC AUTO-ENTMCNC: 33.7 G/DL (ref 31.4–37.4)
MCV RBC AUTO: 89 FL (ref 82–98)
MONOCYTES # BLD AUTO: 0.33 THOUSAND/ÂΜL (ref 0.17–1.22)
MONOCYTES NFR BLD AUTO: 5 % (ref 4–12)
NEUTROPHILS # BLD AUTO: 3.68 THOUSANDS/ÂΜL (ref 1.85–7.62)
NEUTS SEG NFR BLD AUTO: 59 % (ref 43–75)
NRBC BLD AUTO-RTO: 0 /100 WBCS
P AXIS: 46 DEGREES
PLATELET # BLD AUTO: 266 THOUSANDS/UL (ref 149–390)
PMV BLD AUTO: 11.4 FL (ref 8.9–12.7)
POTASSIUM SERPL-SCNC: 5.2 MMOL/L (ref 3.5–5.3)
PR INTERVAL: 156 MS
PROT SERPL-MCNC: 7.9 G/DL (ref 6.4–8.4)
QRS AXIS: 21 DEGREES
QRSD INTERVAL: 78 MS
QT INTERVAL: 446 MS
QTC INTERVAL: 426 MS
RBC # BLD AUTO: 4.39 MILLION/UL (ref 3.81–5.12)
SODIUM SERPL-SCNC: 139 MMOL/L (ref 135–147)
T WAVE AXIS: 73 DEGREES
VENTRICULAR RATE: 55 BPM
WBC # BLD AUTO: 6.22 THOUSAND/UL (ref 4.31–10.16)

## 2024-08-17 PROCEDURE — 36415 COLL VENOUS BLD VENIPUNCTURE: CPT | Performed by: PHYSICIAN ASSISTANT

## 2024-08-17 PROCEDURE — 80053 COMPREHEN METABOLIC PANEL: CPT | Performed by: PHYSICIAN ASSISTANT

## 2024-08-17 PROCEDURE — 83880 ASSAY OF NATRIURETIC PEPTIDE: CPT | Performed by: PHYSICIAN ASSISTANT

## 2024-08-17 PROCEDURE — 96375 TX/PRO/DX INJ NEW DRUG ADDON: CPT

## 2024-08-17 PROCEDURE — 85025 COMPLETE CBC W/AUTO DIFF WBC: CPT | Performed by: PHYSICIAN ASSISTANT

## 2024-08-17 PROCEDURE — 93005 ELECTROCARDIOGRAM TRACING: CPT

## 2024-08-17 PROCEDURE — 85379 FIBRIN DEGRADATION QUANT: CPT | Performed by: PHYSICIAN ASSISTANT

## 2024-08-17 PROCEDURE — 82948 REAGENT STRIP/BLOOD GLUCOSE: CPT

## 2024-08-17 PROCEDURE — 99285 EMERGENCY DEPT VISIT HI MDM: CPT

## 2024-08-17 PROCEDURE — 93010 ELECTROCARDIOGRAM REPORT: CPT

## 2024-08-17 PROCEDURE — 96374 THER/PROPH/DIAG INJ IV PUSH: CPT

## 2024-08-17 PROCEDURE — 84484 ASSAY OF TROPONIN QUANT: CPT | Performed by: PHYSICIAN ASSISTANT

## 2024-08-17 RX ORDER — KETOROLAC TROMETHAMINE 30 MG/ML
15 INJECTION, SOLUTION INTRAMUSCULAR; INTRAVENOUS ONCE
Status: COMPLETED | OUTPATIENT
Start: 2024-08-17 | End: 2024-08-17

## 2024-08-17 RX ORDER — METOCLOPRAMIDE HYDROCHLORIDE 5 MG/ML
5 INJECTION INTRAMUSCULAR; INTRAVENOUS ONCE
Status: COMPLETED | OUTPATIENT
Start: 2024-08-17 | End: 2024-08-17

## 2024-08-17 RX ORDER — DIPHENHYDRAMINE HYDROCHLORIDE 50 MG/ML
25 INJECTION INTRAMUSCULAR; INTRAVENOUS ONCE
Status: COMPLETED | OUTPATIENT
Start: 2024-08-17 | End: 2024-08-17

## 2024-08-17 RX ADMIN — METOCLOPRAMIDE 5 MG: 5 INJECTION, SOLUTION INTRAMUSCULAR; INTRAVENOUS at 23:22

## 2024-08-17 RX ADMIN — KETOROLAC TROMETHAMINE 15 MG: 30 INJECTION, SOLUTION INTRAMUSCULAR; INTRAVENOUS at 23:20

## 2024-08-17 RX ADMIN — DIPHENHYDRAMINE HYDROCHLORIDE 25 MG: 50 INJECTION, SOLUTION INTRAMUSCULAR; INTRAVENOUS at 23:21

## 2024-08-18 ENCOUNTER — APPOINTMENT (EMERGENCY)
Dept: RADIOLOGY | Facility: HOSPITAL | Age: 64
End: 2024-08-18
Payer: COMMERCIAL

## 2024-08-18 VITALS
RESPIRATION RATE: 16 BRPM | HEART RATE: 66 BPM | SYSTOLIC BLOOD PRESSURE: 195 MMHG | WEIGHT: 184.53 LBS | OXYGEN SATURATION: 99 % | TEMPERATURE: 97.8 F | BODY MASS INDEX: 32.69 KG/M2 | DIASTOLIC BLOOD PRESSURE: 120 MMHG

## 2024-08-18 PROCEDURE — 96361 HYDRATE IV INFUSION ADD-ON: CPT

## 2024-08-18 PROCEDURE — 99285 EMERGENCY DEPT VISIT HI MDM: CPT | Performed by: PHYSICIAN ASSISTANT

## 2024-08-18 PROCEDURE — 71046 X-RAY EXAM CHEST 2 VIEWS: CPT

## 2024-08-18 RX ORDER — ACETAMINOPHEN 325 MG/1
650 TABLET ORAL ONCE
Status: COMPLETED | OUTPATIENT
Start: 2024-08-18 | End: 2024-08-18

## 2024-08-18 RX ORDER — AMLODIPINE BESYLATE 10 MG/1
10 TABLET ORAL DAILY
Qty: 10 TABLET | Refills: 0 | Status: SHIPPED | OUTPATIENT
Start: 2024-08-18

## 2024-08-18 RX ORDER — AMLODIPINE BESYLATE 5 MG/1
10 TABLET ORAL ONCE
Status: COMPLETED | OUTPATIENT
Start: 2024-08-18 | End: 2024-08-18

## 2024-08-18 RX ADMIN — ACETAMINOPHEN 650 MG: 325 TABLET ORAL at 01:32

## 2024-08-18 RX ADMIN — SODIUM CHLORIDE 1000 ML: 0.9 INJECTION, SOLUTION INTRAVENOUS at 00:13

## 2024-08-18 RX ADMIN — AMLODIPINE BESYLATE 10 MG: 5 TABLET ORAL at 02:04

## 2024-08-18 NOTE — ED PROVIDER NOTES
"History  Chief Complaint   Patient presents with    Dizziness     Pt c/o dizziness and blurry vision in R eye for 2 days. Also reports some SOB with activity, intermittent CP on R side- unable to describe. No meds PTA , reports some of these symptoms are similar to her normal anxiety except the dizziness.      64-year-old female with history of hypertension and hyperlipidemia as well as anxiety and psychiatric disorder presents with multiple complaints.  Patient states that she occasionally has right eye pain and blurry vision but also complains of some shortness of breath and intermittent chest pain.  Patient also complains of generalized weakness and headache but states it feels similar to her prior migraines and anxiety.  When asked her main complaint today patient states that she is having chest pain worse with laying down with occasional shortness of breath.  Has not take any medications prior to arrival.  Patient states she has not been taking her hypertension medications for a while due to \"conspiracy theories\". Denies sudden onset or worst headache of her life sensation.  Denies any focal neurologic complaints.  Ambulates without difficulty.  Denies any other complaints.      History provided by:  Patient   used: No        Prior to Admission Medications   Prescriptions Last Dose Informant Patient Reported? Taking?   acetaminophen (TYLENOL) 650 mg CR tablet  Self No No   Sig: Take 1 tablet (650 mg total) by mouth every 8 (eight) hours as needed for mild pain   amLODIPine (NORVASC) 10 mg tablet  Self No No   Sig: Take 1 tablet (10 mg total) by mouth daily   cyclobenzaprine (FLEXERIL) 10 mg tablet  Self No No   Sig: Take 1 tablet (10 mg total) by mouth 2 (two) times a day as needed for muscle spasms   hydrOXYzine HCL (ATARAX) 25 mg tablet  Self No No   Sig: Take 1 tablet (25 mg total) by mouth every 6 (six) hours   lidocaine (Lidoderm) 5 %  Self No No   Sig: Apply 1 patch topically over 12 " hours daily Remove & Discard patch within 12 hours or as directed by MD   nystatin-triamcinolone (MYCOLOG-II) ointment  Self No No   Sig: Apply topically 2 (two) times a day Apply to under breasts as needed for rash. 1-2x per day   rosuvastatin (CRESTOR) 10 MG tablet  Self No No   Sig: Take 1 tablet (10 mg total) by mouth daily      Facility-Administered Medications: None       Past Medical History:   Diagnosis Date    Anxiety     Chronic pain     back pain     Depression 2024    Diabetes mellitus (HCC)     Disease of thyroid gland 2024    hypothyroid     Hyperlipidemia     Hypertension     Psychiatric disorder 2024       Past Surgical History:   Procedure Laterality Date    DENTAL SURGERY      HYSTERECTOMY      over 20 years ago    NM EXCISION TUMOR SOFT TIS BACK/FLANK SUBQ 3 CM/> Right 2024    Procedure: UPPER BACK EXCISION  BX LESION/MASS;  Surgeon: Jade Farah MD;  Location: [facility];  Service: General       Family History   Problem Relation Age of Onset    Dementia Mother     Cancer Maternal Aunt     Cancer Maternal Uncle      I have reviewed and agree with the history as documented.    E-Cigarette/Vaping    E-Cigarette Use Never User      E-Cigarette/Vaping Substances    Nicotine No     THC No     CBD No     Flavoring No     Other No     Unknown No      Social History     Tobacco Use    Smoking status: Former     Current packs/day: 0.00     Average packs/day: 0.5 packs/day for 40.0 years (20.0 ttl pk-yrs)     Types: Cigarettes     Start date: 1983     Quit date: 2023     Years since quittin.3     Passive exposure: Past    Smokeless tobacco: Never   Vaping Use    Vaping status: Never Used   Substance Use Topics    Alcohol use: Yes     Comment: socially     Drug use: Never       Review of Systems   Constitutional: Negative.  Negative for chills and fatigue.   HENT:  Negative for ear pain and sore throat.    Eyes:  Negative for photophobia and redness.   Respiratory:   Positive for shortness of breath. Negative for apnea.    Cardiovascular:  Positive for chest pain.   Gastrointestinal:  Negative for abdominal pain, nausea and vomiting.   Genitourinary:  Negative for dysuria.   Musculoskeletal:  Negative for arthralgias, neck pain and neck stiffness.   Skin:  Negative for rash.   Neurological:  Positive for weakness and headaches. Negative for dizziness, tremors and syncope.   Psychiatric/Behavioral:  Negative for suicidal ideas.        Physical Exam  Physical Exam  Constitutional:       General: She is not in acute distress.     Appearance: She is well-developed. She is not diaphoretic.   Eyes:      Pupils: Pupils are equal, round, and reactive to light.   Cardiovascular:      Rate and Rhythm: Normal rate and regular rhythm.   Pulmonary:      Effort: Pulmonary effort is normal. No respiratory distress.      Breath sounds: Normal breath sounds.   Abdominal:      General: Bowel sounds are normal. There is no distension.      Palpations: Abdomen is soft.   Musculoskeletal:         General: Normal range of motion.      Cervical back: Normal range of motion and neck supple.   Skin:     General: Skin is warm and dry.   Neurological:      General: No focal deficit present.      Mental Status: She is alert and oriented to person, place, and time.      Cranial Nerves: No cranial nerve deficit.      Sensory: No sensory deficit.      Motor: No weakness.      Gait: Gait normal.         Vital Signs  ED Triage Vitals   Temperature Pulse Respirations Blood Pressure SpO2   08/17/24 2237 08/17/24 2237 08/17/24 2237 08/17/24 2237 08/17/24 2237   97.8 °F (36.6 °C) (!) 53 20 (!) 175/99 99 %      Temp Source Heart Rate Source Patient Position - Orthostatic VS BP Location FiO2 (%)   08/17/24 2237 08/17/24 2237 08/17/24 2237 08/17/24 2237 --   Oral Monitor Lying Right arm       Pain Score       08/17/24 2254       8           Vitals:    08/17/24 2237 08/18/24 0134   BP: (!) 175/99 (!) 195/120   Pulse:  (!) 53 66   Patient Position - Orthostatic VS: Lying Lying         Visual Acuity  Visual Acuity      Flowsheet Row Most Recent Value   L Pupil Size (mm) 2   R Pupil Size (mm) 2            ED Medications  Medications   ketorolac (TORADOL) injection 15 mg (15 mg Intravenous Given 8/17/24 2320)   metoclopramide (REGLAN) injection 5 mg (5 mg Intravenous Given 8/17/24 2322)   diphenhydrAMINE (BENADRYL) injection 25 mg (25 mg Intravenous Given 8/17/24 2321)   sodium chloride 0.9 % bolus 1,000 mL (0 mL Intravenous Stopped 8/18/24 0131)   acetaminophen (TYLENOL) tablet 650 mg (650 mg Oral Given 8/18/24 0132)   amLODIPine (NORVASC) tablet 10 mg (10 mg Oral Given 8/18/24 0204)       Diagnostic Studies  Results Reviewed       Procedure Component Value Units Date/Time    HS Troponin I 4hr [[id number]]     Lab Status: No result Specimen: Blood     D-dimer, quantitative [[id number]]  (Abnormal) Collected: 08/17/24 2326    Lab Status: Final result Specimen: Blood from Arm, Left Updated: 08/17/24 2349     D-Dimer, Quant 0.55 ug/ml FEU     Narrative:      In the evaluation for possible pulmonary embolism, in the appropriate (Well's Score of 4 or less) patient, the age adjusted d-dimer cutoff for this patient can be calculated as:    Age x 0.01 (in ug/mL) for Age-adjusted D-dimer exclusion threshold for a patient over 50 years.    Comprehensive metabolic panel [[id number]] Collected: 08/17/24 2256    Lab Status: Final result Specimen: Blood from Arm, Left Updated: 08/17/24 2345     Sodium 139 mmol/L      Potassium 5.2 mmol/L      Chloride 107 mmol/L      CO2 24 mmol/L      ANION GAP 8 mmol/L      BUN 11 mg/dL      Creatinine 0.99 mg/dL      Glucose 98 mg/dL      Calcium 9.8 mg/dL      AST 22 U/L      ALT 9 U/L      Alkaline Phosphatase 52 U/L      Total Protein 7.9 g/dL      Albumin 4.6 g/dL      Total Bilirubin 0.32 mg/dL      eGFR 60 ml/min/1.73sq m     Narrative:      National Kidney Disease Foundation guidelines for Chronic Kidney  Disease (CKD):     Stage 1 with normal or high GFR (GFR > 90 mL/min/1.73 square meters)    Stage 2 Mild CKD (GFR = 60-89 mL/min/1.73 square meters)    Stage 3A Moderate CKD (GFR = 45-59 mL/min/1.73 square meters)    Stage 3B Moderate CKD (GFR = 30-44 mL/min/1.73 square meters)    Stage 4 Severe CKD (GFR = 15-29 mL/min/1.73 square meters)    Stage 5 End Stage CKD (GFR <15 mL/min/1.73 square meters)  Note: GFR calculation is accurate only with a steady state creatinine    HS Troponin I 2hr [[id number]]     Lab Status: No result Specimen: Blood     HS Troponin 0hr (reflex protocol) [[id number]]  (Normal) Collected: 08/17/24 2256    Lab Status: Final result Specimen: Blood from Arm, Left Updated: 08/17/24 2331     hs TnI 0hr 3 ng/L     B-Type Natriuretic Peptide(BNP) [[id number]]  (Normal) Collected: 08/17/24 2256    Lab Status: Final result Specimen: Blood from Arm, Left Updated: 08/17/24 2330     BNP 27 pg/mL     CBC and differential [[id number]]  (Abnormal) Collected: 08/17/24 2256    Lab Status: Final result Specimen: Blood from Arm, Left Updated: 08/17/24 2303     WBC 6.22 Thousand/uL      RBC 4.39 Million/uL      Hemoglobin 13.1 g/dL      Hematocrit 38.9 %      MCV 89 fL      MCH 29.8 pg      MCHC 33.7 g/dL      RDW 15.7 %      MPV 11.4 fL      Platelets 266 Thousands/uL      nRBC 0 /100 WBCs      Segmented % 59 %      Immature Grans % 1 %      Lymphocytes % 34 %      Monocytes % 5 %      Eosinophils Relative 1 %      Basophils Relative 0 %      Absolute Neutrophils 3.68 Thousands/µL      Absolute Immature Grans 0.04 Thousand/uL      Absolute Lymphocytes 2.11 Thousands/µL      Absolute Monocytes 0.33 Thousand/µL      Eosinophils Absolute 0.04 Thousand/µL      Basophils Absolute 0.02 Thousands/µL     Fingerstick Glucose (POCT) [[id number]]  (Normal) Collected: 08/17/24 2231    Lab Status: Final result Specimen: Blood Updated: 08/17/24 2232     POC Glucose 103 mg/dl                    XR chest 2 views   ED Interpretation  by Ana Del Real PA-C (08/18 0024)   No obvious focal consolidation                  Procedures  ECG 12 Lead Documentation Only    Date/Time: 8/18/2024 2:59 AM    Performed by: Ana Del Real PA-C  Authorized by: Ana Del Real PA-C    Indications / Diagnosis:  Chest pain  ECG reviewed by me, the ED Provider: yes    Patient location:  ED  Interpretation:     Interpretation: normal    Rate:     ECG rate:  55    ECG rate assessment: bradycardic    Rhythm:     Rhythm: sinus bradycardia    Ectopy:     Ectopy: none    QRS:     QRS axis:  Normal    QRS intervals:  Normal  Conduction:     Conduction: normal    ST segments:     ST segments:  Normal  T waves:     T waves: normal             ED Course               HEART Risk Score      Flowsheet Row Most Recent Value   Heart Score Risk Calculator    History 0 Filed at: 08/18/2024 0258   ECG 0 Filed at: 08/18/2024 0258   Age 1 Filed at: 08/18/2024 0258   Risk Factors 1 Filed at: 08/18/2024 0258   Troponin 0 Filed at: 08/18/2024 0258   HEART Score 2 Filed at: 08/18/2024 0258             Stroke Assessment       Row Name 08/18/24 0257             NIH Stroke Scale    Interval Baseline      Level of Consciousness (1a.) 0      LOC Questions (1b.) 0      LOC Commands (1c.) 0      Best Gaze (2.) 0      Visual (3.) 0      Facial Palsy (4.) 0      Motor Arm, Left (5a.) 0      Motor Arm, Right (5b.) 0      Motor Leg, Left (6a.) 0      Motor Leg, Right (6b.) 0      Limb Ataxia (7.) 0      Sensory (8.) 0      Best Language (9.) 0      Dysarthria (10.) 0      Extinction and Inattention (11.) (Formerly Neglect) 0      Total 0                    Flowsheet Row Most Recent Value   Thrombolytic Decision Options    Thrombolytic Decision After a discussion of risks, benefits, alternatives and side effects (including best medical therapy excluding thrombolysis) reviewing inclusion and exclusion criteria the decision was made to proceed with thrombolytic therapy.       "                              Medical Decision Making  Patient presented the emergency department with multiple complaints.  Upon further evaluation patient states that she was mostly here for chest pain as well as generalized weakness.  Patient had extensive laboratory evaluation including age-adjusted D-dimer troponin and BNP which were all found to be negative.  Patient had significant relief of symptoms with medications given in the emergency department.  Patient had negative chest x-ray.  Benign EKG.  Patient was resting comfortably in the room.  NIH is 0 on reevaluation patient stated that she was feeling much better and was ambulating around the department and requesting to go home.  Patient was made aware of her elevated blood pressure in the emergency department patient then explained that she stopped taking her blood pressure medicine secondary to \"conspiracy theories\" this was confirmed by her  who states he has been requesting her to go to the doctor.  After extensive decision-making discussion with the patient regarding the risks of untreated hypertension patient agreeable to take amlodipine and continue at home with primary care follow-up.  On final evaluation patient had no symptoms and requested to go home.  Patient ambulated out of the department with no complaints.    Amount and/or Complexity of Data Reviewed  Labs: ordered.  Radiology: ordered and independent interpretation performed.    Risk  OTC drugs.  Prescription drug management.                 Disposition  Final diagnoses:   Hypertension   Chest pain     Time reflects when diagnosis was documented in both MDM as applicable and the Disposition within this note       Time User Action Codes Description Comment    8/18/2024  2:25 AM Ana Del Real Add [I10] Hypertension     8/18/2024  2:26 AM Ana Del Real Add [R07.9] Chest pain           ED Disposition       ED Disposition   Discharge    Condition   Stable    Date/Time   Sun Aug " 18, 2024 0225    Comment   Latoya Lewis discharge to home/self care.                   Follow-up Information       Follow up With Specialties Details Why Contact Info Additional Information    [facility] Emergency Department Emergency Medicine Go to  If symptoms worsen [address] [facility] [address] [phone] [facility] Emergency Department    [facility], Nurse Practitioner Call  As needed [address] [facility] [address]  [facility] [address]  [phone]               Discharge Medication List as of 8/18/2024  2:28 AM        START taking these medications    Details   !! amLODIPine (NORVASC) 10 mg tablet Take 1 tablet (10 mg total) by mouth daily, Starting Sun 8/18/2024, Normal       !! - Potential duplicate medications found. Please discuss with provider.        CONTINUE these medications which have NOT CHANGED    Details   acetaminophen (TYLENOL) 650 mg CR tablet Take 1 tablet (650 mg total) by mouth every 8 (eight) hours as needed for mild pain, Starting Sun 7/23/2023, Normal      !! amLODIPine (NORVASC) 10 mg tablet Take 1 tablet (10 mg total) by mouth daily, Starting Mon 11/6/2023, Normal      cyclobenzaprine (FLEXERIL) 10 mg tablet Take 1 tablet (10 mg total) by mouth 2 (two) times a day as needed for muscle spasms, Starting Wed 8/30/2023, Normal      hydrOXYzine HCL (ATARAX) 25 mg tablet Take 1 tablet (25 mg total) by mouth every 6 (six) hours, Starting Sun 7/23/2023, Normal      lidocaine (Lidoderm) 5 % Apply 1 patch topically over 12 hours daily Remove & Discard patch within 12 hours or as directed by MD, Starting Wed 8/30/2023, Normal      nystatin-triamcinolone (MYCOLOG-II) ointment Apply topically 2 (two) times a day Apply to under breasts as needed for rash. 1-2x per day, Starting Fri 1/26/2024, Normal      rosuvastatin (CRESTOR) 10 MG tablet Take 1 tablet (10 mg total) by mouth daily, Starting Wed 11/8/2023,  Normal       !! - Potential duplicate medications found. Please discuss with provider.          No discharge procedures on file.    PDMP Review         Value Time User    PDMP Reviewed  Yes 2/5/2024  3:54 PM Brooklyn Phillips PA-C            ED Provider  Electronically Signed by             Ana Del Real PA-C  08/18/24 030

## 2024-09-17 ENCOUNTER — OFFICE VISIT (OUTPATIENT)
Dept: FAMILY MEDICINE CLINIC | Facility: CLINIC | Age: 64
End: 2024-09-17

## 2024-09-17 VITALS
DIASTOLIC BLOOD PRESSURE: 84 MMHG | SYSTOLIC BLOOD PRESSURE: 140 MMHG | HEIGHT: 63 IN | HEART RATE: 57 BPM | OXYGEN SATURATION: 97 % | BODY MASS INDEX: 31.01 KG/M2 | TEMPERATURE: 98 F | WEIGHT: 175 LBS | RESPIRATION RATE: 20 BRPM

## 2024-09-17 DIAGNOSIS — I10 HYPERTENSION: ICD-10-CM

## 2024-09-17 DIAGNOSIS — F99 PSYCHIATRIC DISORDER: ICD-10-CM

## 2024-09-17 DIAGNOSIS — R41.82 ALTERED MENTAL STATUS, UNSPECIFIED ALTERED MENTAL STATUS TYPE: Primary | ICD-10-CM

## 2024-09-17 DIAGNOSIS — M54.50 LUMBAR BACK PAIN: ICD-10-CM

## 2024-09-17 DIAGNOSIS — F41.9 ANXIETY: ICD-10-CM

## 2024-09-17 DIAGNOSIS — E07.9 DISEASE OF THYROID GLAND: ICD-10-CM

## 2024-09-17 DIAGNOSIS — E11.9 TYPE 2 DIABETES MELLITUS WITHOUT COMPLICATION, UNSPECIFIED WHETHER LONG TERM INSULIN USE (HCC): ICD-10-CM

## 2024-09-17 DIAGNOSIS — E78.2 MIXED HYPERLIPIDEMIA: ICD-10-CM

## 2024-09-17 PROCEDURE — 99213 OFFICE O/P EST LOW 20 MIN: CPT | Performed by: FAMILY MEDICINE

## 2024-09-17 RX ORDER — HYDROXYZINE HYDROCHLORIDE 25 MG/1
25 TABLET, FILM COATED ORAL EVERY 6 HOURS
Qty: 12 TABLET | Refills: 0 | Status: CANCELLED | OUTPATIENT
Start: 2024-09-17

## 2024-09-17 RX ORDER — AMLODIPINE BESYLATE 10 MG/1
10 TABLET ORAL DAILY
Qty: 10 TABLET | Refills: 0 | Status: CANCELLED | OUTPATIENT
Start: 2024-09-17

## 2024-09-17 RX ORDER — LIDOCAINE 50 MG/G
1 PATCH TOPICAL DAILY
Qty: 30 PATCH | Refills: 0 | Status: SHIPPED | OUTPATIENT
Start: 2024-09-17

## 2024-09-17 RX ORDER — SENNOSIDES 8.6 MG
650 CAPSULE ORAL EVERY 8 HOURS PRN
Qty: 30 TABLET | Refills: 0 | Status: SHIPPED | OUTPATIENT
Start: 2024-09-17

## 2024-09-17 RX ORDER — CYCLOBENZAPRINE HCL 10 MG
10 TABLET ORAL 2 TIMES DAILY PRN
Qty: 20 TABLET | Refills: 0 | Status: CANCELLED | OUTPATIENT
Start: 2024-09-17

## 2024-09-17 RX ORDER — ROSUVASTATIN CALCIUM 10 MG/1
10 TABLET, COATED ORAL DAILY
Qty: 30 TABLET | Refills: 2 | Status: CANCELLED | OUTPATIENT
Start: 2024-09-17

## 2024-09-17 NOTE — ASSESSMENT & PLAN NOTE
Has not been compliant to meds for over a year.    Plan  Ordered lipid panel  To follow up to discuss medical management if required  Orders:    Lipid panel; Future

## 2024-09-17 NOTE — PROGRESS NOTES
Ambulatory Visit  Name: Latoya Lewis      : 1960      MRN: [id number]  Encounter Provider: Evy Briscoe MD  Encounter Date: 2024   Encounter department: [facility]    Assessment & Plan  Anxiety  She seemed very confused as to if she was to be on medications or not for her mental health  Patient very unsure of her possible mental health  No psych follow up  Has not been compliant to meds for over a year.    Plan  Do PHQ-9, MIC screening on next visit  Talk to patient alone (if possible)  Convinced patient that depending on what we end up concluding may put in a consult for psych  Start anxiety meds/ medical management if required after evaluation on next follow up visit      Orders:    acetaminophen (TYLENOL) 650 mg CR tablet; Take 1 tablet (650 mg total) by mouth every 8 (eight) hours as needed for mild pain    Hypertension  110/70 mmHg today at office visit  However reports non-compliance to meds for over a year.    Plan  To CMP  To follow up to discuss medical management if required    Mixed hyperlipidemia  Has not been compliant to meds for over a year.    Plan  Ordered lipid panel  To follow up to discuss medical management if required  Orders:    Lipid panel; Future    Altered mental status, unspecified altered mental status type    Orders:    CBC and differential; Future    Comprehensive metabolic panel; Future    TSH, 3rd generation with Free T4 reflex; Future    Type 2 diabetes mellitus without complication, unspecified whether long term insulin use (HCC)    Lab Results   Component Value Date    HGBA1C 6.5 (H) 2023   Not well controlled  Has not been compliant to meds for over a year.    Plan  To order HbA1c, CMP  To follow up to discuss medical management if required      Orders:    Hemoglobin A1C; Future    Disease of thyroid gland  Has not been compliant to meds for over a year.    Plan  To order TSH   To follow up to discuss medical management if  required         Lumbar back pain  Pain not well controlled  Has not been compliant to meds for over a year.    Plan  Ordered Lidoderm patch 5% topically  To follow up to discuss medical management if required    Orders:    lidocaine (Lidoderm) 5 %; Apply 1 patch topically over 12 hours daily Remove & Discard patch within 12 hours or as directed by MD    Psychiatric disorder  Not well controlled  Has not been compliant to meds for over a year.    Plan  To do PHQ and MIC screening on next visit  To follow up to discuss medical management if required            History of Present Illness     Latoya Lewis 63 y/o female presents today with her  wanting to be restarted on all her medications as she hasn't been taking any medications for the past year. According to the , she needs medications for her hypertension, hyperlipidemia, anxiety and back pain. He alleged that she was going through a lot of mental health issues at that time and so she thought those medications were causing her harm and didn't trust the system that's why she had not been taking them.    According to the  he is not sure as to why, but that she was prescribed cymbalta since 2018 in [place] while they were . And a year later she was in [place] at the ED department after allegedly presenting with low heart rate and was taken off the Cymbalta. Following which he states she has been having a lot forgetfulness and being unable to remember activities she previously knew, like cooking.     She denies any history of vaping or substance use at all. She reports her mother had alzheimer's disease around the age of 70 years old.Her  says she always has back pain and that he has been giving her moltrin 600 mg from his niece that has been prescribed the medications, and that it has helped relief her back pain very much. She agrees that her back really hurts. He reports that she had been having shortness of breath for  "the past 2 months on exertion, and that she has reduced tolerance to activities she used to do before.   On further questioning, she complains of hand stiffness and feet tingling but wasn't able to give more information than that.                        Review of Systems   Constitutional:  Negative for chills and fever.        Overall patient did not seem to be fully capable of answering with certainty. She had to ask her  affirm certain question answered.   HENT:  Negative for ear pain and sore throat.    Eyes:  Negative for pain and visual disturbance.   Respiratory:  Negative for cough and shortness of breath.    Cardiovascular:  Positive for chest pain (sometimes on and off). Negative for palpitations.   Gastrointestinal:  Negative for abdominal pain, constipation, diarrhea, nausea and vomiting.   Genitourinary:  Negative for dysuria and hematuria.   Musculoskeletal:  Positive for back pain. Negative for arthralgias.   Skin:  Negative for color change and rash.   Neurological:  Negative for seizures and syncope.   All other systems reviewed and are negative.          Objective     /84 (BP Location: Right arm, Patient Position: Sitting, Cuff Size: Standard)   Pulse 57   Temp 98 °F (36.7 °C) (Temporal)   Resp 20   Ht 5' 3\" (1.6 m)   Wt 79.4 kg (175 lb)   SpO2 97%   BMI 31.00 kg/m²     Physical Exam  Vitals and nursing note reviewed.   Constitutional:       General: She is not in acute distress.     Appearance: Normal appearance. She is well-developed. She is not ill-appearing, toxic-appearing or diaphoretic.   HENT:      Head: Normocephalic and atraumatic.   Eyes:      Extraocular Movements: Extraocular movements intact.      Conjunctiva/sclera: Conjunctivae normal.   Cardiovascular:      Rate and Rhythm: Normal rate and regular rhythm.      Heart sounds: No murmur heard.  Pulmonary:      Effort: Pulmonary effort is normal. No respiratory distress.      Breath sounds: Normal breath sounds. "   Abdominal:      Palpations: Abdomen is soft.      Tenderness: There is no abdominal tenderness.   Musculoskeletal:         General: No swelling.      Cervical back: Neck supple.   Skin:     General: Skin is warm and dry.      Capillary Refill: Capillary refill takes less than 2 seconds.   Neurological:      Mental Status: She is alert.   Psychiatric:      Comments: She seemed very unsure of a lot of things pertaining to herself, almost fully relying on her  to reply.

## 2024-09-17 NOTE — ASSESSMENT & PLAN NOTE
She seemed very confused as to if she was to be on medications or not for her mental health  Patient very unsure of her possible mental health  No psych follow up  Has not been compliant to meds for over a year.    Plan  Do PHQ-9, MIC screening on next visit  Talk to patient alone (if possible)  Convinced patient that depending on what we end up concluding may put in a consult for psych  Start anxiety meds/ medical management if required after evaluation on next follow up visit      Orders:    acetaminophen (TYLENOL) 650 mg CR tablet; Take 1 tablet (650 mg total) by mouth every 8 (eight) hours as needed for mild pain

## 2024-09-19 NOTE — ASSESSMENT & PLAN NOTE
Lab Results   Component Value Date    HGBA1C 6.5 (H) 11/06/2023   Not well controlled  Has not been compliant to meds for over a year.    Plan  To order HbA1c, CMP  To follow up to discuss medical management if required      Orders:    Hemoglobin A1C; Future

## 2024-09-19 NOTE — ASSESSMENT & PLAN NOTE
Not well controlled  Has not been compliant to meds for over a year.    Plan  To do PHQ and MIC screening on next visit  To follow up to discuss medical management if required

## 2024-09-19 NOTE — ASSESSMENT & PLAN NOTE
110/70 mmHg today at office visit  However reports non-compliance to meds for over a year.    Plan  To CMP  To follow up to discuss medical management if required

## 2024-09-19 NOTE — ASSESSMENT & PLAN NOTE
Pain not well controlled  Has not been compliant to meds for over a year.    Plan  Ordered Lidoderm patch 5% topically  To follow up to discuss medical management if required    Orders:    lidocaine (Lidoderm) 5 %; Apply 1 patch topically over 12 hours daily Remove & Discard patch within 12 hours or as directed by MD

## 2024-09-19 NOTE — ASSESSMENT & PLAN NOTE
Has not been compliant to meds for over a year.    Plan  To order TSH   To follow up to discuss medical management if required

## 2024-09-25 ENCOUNTER — HOSPITAL ENCOUNTER (EMERGENCY)
Facility: HOSPITAL | Age: 64
Discharge: HOME | End: 2024-09-25
Attending: EMERGENCY MEDICINE
Payer: COMMERCIAL

## 2024-09-25 VITALS
DIASTOLIC BLOOD PRESSURE: 75 MMHG | BODY MASS INDEX: 32.41 KG/M2 | TEMPERATURE: 97.6 F | RESPIRATION RATE: 24 BRPM | WEIGHT: 182.98 LBS | OXYGEN SATURATION: 94 % | SYSTOLIC BLOOD PRESSURE: 127 MMHG | HEART RATE: 69 BPM

## 2024-09-25 DIAGNOSIS — N75.0 BARTHOLIN CYST: Primary | ICD-10-CM

## 2024-09-25 LAB
ATRIAL RATE: 54 BPM
GLUCOSE SERPL-MCNC: 138 MG/DL (ref 65–140)
P AXIS: 44 DEGREES
PR INTERVAL: 164 MS
QRS AXIS: 12 DEGREES
QRSD INTERVAL: 70 MS
QT INTERVAL: 482 MS
QTC INTERVAL: 457 MS
T WAVE AXIS: 123 DEGREES
VENTRICULAR RATE: 54 BPM

## 2024-09-25 PROCEDURE — 93005 ELECTROCARDIOGRAM TRACING: CPT

## 2024-09-25 PROCEDURE — 99284 EMERGENCY DEPT VISIT MOD MDM: CPT | Performed by: PHYSICIAN ASSISTANT

## 2024-09-25 PROCEDURE — 93010 ELECTROCARDIOGRAM REPORT: CPT

## 2024-09-25 PROCEDURE — 99284 EMERGENCY DEPT VISIT MOD MDM: CPT

## 2024-09-25 PROCEDURE — 56420 I&D BARTHOLINS GLAND ABSCESS: CPT | Performed by: PHYSICIAN ASSISTANT

## 2024-09-25 PROCEDURE — 82948 REAGENT STRIP/BLOOD GLUCOSE: CPT

## 2024-09-25 RX ORDER — LIDOCAINE HYDROCHLORIDE 20 MG/ML
10 INJECTION, SOLUTION EPIDURAL; INFILTRATION; INTRACAUDAL; PERINEURAL ONCE
Status: COMPLETED | OUTPATIENT
Start: 2024-09-25 | End: 2024-09-25

## 2024-09-25 RX ORDER — LORAZEPAM 0.5 MG/1
0.5 TABLET ORAL ONCE
Status: COMPLETED | OUTPATIENT
Start: 2024-09-25 | End: 2024-09-25

## 2024-09-25 RX ORDER — ACETAMINOPHEN 500 MG
500 TABLET ORAL EVERY 6 HOURS PRN
Qty: 30 TABLET | Refills: 0 | Status: SHIPPED | OUTPATIENT
Start: 2024-09-25

## 2024-09-25 RX ADMIN — LIDOCAINE HYDROCHLORIDE 10 ML: 20 INJECTION, SOLUTION EPIDURAL; INFILTRATION; INTRACAUDAL at 02:47

## 2024-09-25 RX ADMIN — LORAZEPAM 0.5 MG: 0.5 TABLET ORAL at 02:42

## 2024-09-25 NOTE — ED PROVIDER NOTES
"1. Bartholin cyst      ED Disposition       ED Disposition   Discharge    Condition   Stable    Date/Time   Wed Sep 25, 2024  3:44 AM    Comment   Latoya Lewis discharge to home/self care.                   Assessment & Plan       Medical Decision Making  Patient had evidence of a Bartholin's cyst.  Patient's near single episode was related to pain from Bartholin cyst.  No clear role for further evaluation at this time.  Patient had benign EKG and fingerstick glucose.  Patient did not have any presyncopal or syncopal symptoms in the emergency department.  Patient states that she was just nervous for what she was experiencing and almost passed out.  Bartholin cyst drainage was performed by myself and was well-tolerated without complications. Hartman catheter placement was attempted but unsuccessful.  Patient was given follow-up with GYN demonstrates understanding and agrees to follow-up tomorrow.  Patient had improved pain following procedure and was able to ambulate comfortably.    Amount and/or Complexity of Data Reviewed  Labs: ordered.    Risk  OTC drugs.  Prescription drug management.                     Medications   LORazepam (ATIVAN) tablet 0.5 mg (0.5 mg Oral Given 9/25/24 0242)   lidocaine (PF) (XYLOCAINE-MPF) 2 % injection 10 mL (10 mL Infiltration Given 9/25/24 0247)       History of Present Illness       64-year-old female without significant past medical history presents via EMS complaining of pain in her vagina.  Patient states that for the past 1 to 2 days she has noted some pain and pressure in the area however today after getting up from the bathtub patient had a severe sharp pain that was so bad she thought she was going to pass out.  Has been became concerned and called 911.  Patient states that upon arrival to the emergency department she no longer feels as if she is going to pass out but that she has \"Something coming out of my vagina\" Denies any other complaints      History provided by:  " Patient   used: No        Review of Systems   Constitutional: Negative.  Negative for chills and fatigue.   HENT:  Negative for ear pain and sore throat.    Eyes:  Negative for photophobia and redness.   Respiratory:  Negative for apnea, cough and shortness of breath.    Cardiovascular:  Negative for chest pain.   Gastrointestinal:  Negative for abdominal pain, nausea and vomiting.   Genitourinary:  Negative for dysuria.   Musculoskeletal:  Negative for arthralgias, neck pain and neck stiffness.   Skin:  Positive for wound. Negative for rash.   Neurological:  Negative for dizziness, tremors, syncope and weakness.   Psychiatric/Behavioral:  Negative for suicidal ideas.            Objective     ED Triage Vitals [09/25/24 0159]   Temperature Pulse Blood Pressure Respirations SpO2 Patient Position - Orthostatic VS   97.6 °F (36.4 °C) 69 127/75 (!) 24 94 % Lying      Temp Source Heart Rate Source BP Location FiO2 (%) Pain Score    Oral Monitor Left arm -- --        Physical Exam  Constitutional:       General: She is not in acute distress.     Appearance: She is well-developed. She is not diaphoretic.   Eyes:      Pupils: Pupils are equal, round, and reactive to light.   Cardiovascular:      Rate and Rhythm: Normal rate and regular rhythm.   Pulmonary:      Effort: Pulmonary effort is normal. No respiratory distress.      Breath sounds: Normal breath sounds.   Abdominal:      General: Bowel sounds are normal. There is no distension.      Palpations: Abdomen is soft.   Genitourinary:     Comments: Tender raised erythematous fluctuant area in the right labia majora  Musculoskeletal:         General: Normal range of motion.      Cervical back: Normal range of motion and neck supple.   Skin:     General: Skin is warm and dry.   Neurological:      Mental Status: She is alert and oriented to person, place, and time.         Labs Reviewed   POCT GLUCOSE - Normal       Result Value    POC Glucose 138       No  "orders to display       Incision and drain    Date/Time: 9/25/2024 5:17 AM    Performed by: Ana Del Real PA-C  Authorized by: Ana Del Real PA-C  [facility] Protocol:  Procedure performed by: (Bria ALTAMIRANO)  Consent: Verbal consent obtained.  Risks and benefits: risks, benefits and alternatives were discussed  Consent given by: patient  Time out: Immediately prior to procedure a \"time out\" was called to verify the correct patient, procedure, equipment, support staff and site/side marked as required.  Patient understanding: patient states understanding of the procedure being performed  Patient consent: the patient's understanding of the procedure matches consent given  Procedure consent: procedure consent matches procedure scheduled  Relevant documents: relevant documents present and verified  Test results: test results available and properly labeled  Site marked: the operative site was marked  Radiology Images displayed and confirmed. If images not available, report reviewed: imaging studies available  Required items: required blood products, implants, devices, and special equipment available  Patient identity confirmed: verbally with patient    Patient location:  ED  Location:     Type:  Bartholin cyst    Size:  2    Location:  Anogenital    Anogenital location:  Bartholin's gland  Pre-procedure details:     Skin preparation:  Betadine  Sedation:     Sedation type:  Anxiolysis  Anesthesia (see MAR for exact dosages):     Anesthesia method:  Local infiltration    Local anesthetic:  Lidocaine 1% w/o epi  Procedure details:     Complexity:  Simple    Needle aspiration: no      Incision types:  Stab incision    Scalpel blade:  11    Approach:  Open    Incision depth:  Subcutaneous    Wound management:  Irrigated with saline    Drainage:  Bloody    Drainage amount:  Moderate    Wound treatment:  Wound left open  Post-procedure details:     Patient tolerance of procedure:  Tolerated well, no immediate " complications      ED Medication and Procedure Management   Prior to Admission Medications   Prescriptions Last Dose Informant Patient Reported? Taking?   acetaminophen (TYLENOL) 650 mg CR tablet   No No   Sig: Take 1 tablet (650 mg total) by mouth every 8 (eight) hours as needed for mild pain   amLODIPine (NORVASC) 10 mg tablet  Self No No   Sig: Take 1 tablet (10 mg total) by mouth daily   amLODIPine (NORVASC) 10 mg tablet   No No   Sig: Take 1 tablet (10 mg total) by mouth daily   cyclobenzaprine (FLEXERIL) 10 mg tablet  Self No No   Sig: Take 1 tablet (10 mg total) by mouth 2 (two) times a day as needed for muscle spasms   hydrOXYzine HCL (ATARAX) 25 mg tablet  Self No No   Sig: Take 1 tablet (25 mg total) by mouth every 6 (six) hours   lidocaine (Lidoderm) 5 %   No No   Sig: Apply 1 patch topically over 12 hours daily Remove & Discard patch within 12 hours or as directed by MD   rosuvastatin (CRESTOR) 10 MG tablet  Self No No   Sig: Take 1 tablet (10 mg total) by mouth daily      Facility-Administered Medications: None     Discharge Medication List as of 9/25/2024  3:46 AM        START taking these medications    Details   acetaminophen (TYLENOL) 500 mg tablet Take 1 tablet (500 mg total) by mouth every 6 (six) hours as needed for moderate pain, Starting Wed 9/25/2024, Normal           CONTINUE these medications which have NOT CHANGED    Details   acetaminophen (TYLENOL) 650 mg CR tablet Take 1 tablet (650 mg total) by mouth every 8 (eight) hours as needed for mild pain, Starting Tue 9/17/2024, Normal      !! amLODIPine (NORVASC) 10 mg tablet Take 1 tablet (10 mg total) by mouth daily, Starting Mon 11/6/2023, Normal      !! amLODIPine (NORVASC) 10 mg tablet Take 1 tablet (10 mg total) by mouth daily, Starting Sun 8/18/2024, Normal      cyclobenzaprine (FLEXERIL) 10 mg tablet Take 1 tablet (10 mg total) by mouth 2 (two) times a day as needed for muscle spasms, Starting Wed 8/30/2023, Normal      hydrOXYzine HCL  (ATARAX) 25 mg tablet Take 1 tablet (25 mg total) by mouth every 6 (six) hours, Starting Sun 7/23/2023, Normal      lidocaine (Lidoderm) 5 % Apply 1 patch topically over 12 hours daily Remove & Discard patch within 12 hours or as directed by MD, Starting Tue 9/17/2024, Normal      rosuvastatin (CRESTOR) 10 MG tablet Take 1 tablet (10 mg total) by mouth daily, Starting Wed 11/8/2023, Normal       !! - Potential duplicate medications found. Please discuss with provider.        No discharge procedures on file.     Ana Del Real PA-C  09/25/24 3747

## 2024-09-27 ENCOUNTER — HOSPITAL ENCOUNTER (EMERGENCY)
Facility: HOSPITAL | Age: 64
Discharge: HOME | End: 2024-09-27
Attending: EMERGENCY MEDICINE
Payer: COMMERCIAL

## 2024-09-27 VITALS
DIASTOLIC BLOOD PRESSURE: 96 MMHG | TEMPERATURE: 97.8 F | BODY MASS INDEX: 31.63 KG/M2 | WEIGHT: 178.57 LBS | RESPIRATION RATE: 20 BRPM | OXYGEN SATURATION: 99 % | HEART RATE: 60 BPM | SYSTOLIC BLOOD PRESSURE: 152 MMHG

## 2024-09-27 DIAGNOSIS — N75.0 BARTHOLIN'S CYST: ICD-10-CM

## 2024-09-27 DIAGNOSIS — Z48.89 ENCOUNTER FOR POST SURGICAL WOUND CHECK: Primary | ICD-10-CM

## 2024-09-27 PROCEDURE — 99284 EMERGENCY DEPT VISIT MOD MDM: CPT | Performed by: EMERGENCY MEDICINE

## 2024-09-27 PROCEDURE — 99282 EMERGENCY DEPT VISIT SF MDM: CPT

## 2024-09-27 RX ORDER — NAPROXEN 500 MG/1
500 TABLET ORAL 2 TIMES DAILY WITH MEALS
Qty: 20 TABLET | Refills: 0 | Status: SHIPPED | OUTPATIENT
Start: 2024-09-27 | End: 2024-10-07

## 2024-09-27 NOTE — ED PROVIDER NOTES
"Final diagnoses:   Encounter for post surgical wound check   Bartholin's cyst     ED Disposition       ED Disposition   Discharge    Condition   Stable    Date/Time   Fri Sep 27, 2024  4:11 PM    Comment   Latoya Lewis discharge to home/self care.                   Assessment & Plan       Medical Decision Making    MDM/DDx: Opened and drained Bartholin cyst w/o surrounding cellulitis or evidence of abscess accumulation.    A/P: Will start antiinflammatory, recommend Sitz baths and close f/u w/PCP.             Medications - No data to display    ED Risk Strat Scores                           SBIRT 22yo+      Flowsheet Row Most Recent Value   Initial Alcohol Screen: US AUDIT-C     1. How often do you have a drink containing alcohol? 0 Filed at: 09/27/2024 1545   2. How many drinks containing alcohol do you have on a typical day you are drinking?  0 Filed at: 09/27/2024 1549   3a. Male UNDER 65: How often do you have five or more drinks on one occasion? 0 Filed at: 09/27/2024 1548   3b. FEMALE Any Age, or MALE 65+: How often do you have 4 or more drinks on one occassion? 0 Filed at: 09/27/2024 1545   Audit-C Score 0 Filed at: 09/27/2024 1545   VANIA: How many times in the past year have you...    Used an illegal drug or used a prescription medication for non-medical reasons? Never Filed at: 09/27/2024 1546                            History of Present Illness       Chief Complaint   Patient presents with    Abscess - Complicated     Pt reports she had 2 abscesses that were drained 2 days ago, per  \"they tried to put a little drain in, but it wouldn't stay in, and they didn't prescribe any antibiotics, so I don't want her to get any infection with that being wide open.\" Took tylenol today.        Past Medical History:   Diagnosis Date    Anxiety     Chronic pain     back pain     Depression 1/23/2024    Diabetes mellitus (HCC)     Disease of thyroid gland 1/23/2024    hypothyroid     Hyperlipidemia     " "Hypertension     Psychiatric disorder 2024      Past Surgical History:   Procedure Laterality Date    DENTAL SURGERY      HYSTERECTOMY      over 20 years ago    MS EXCISION TUMOR SOFT TIS BACK/FLANK SUBQ 3 CM/> Right 2024    Procedure: UPPER BACK EXCISION  BX LESION/MASS;  Surgeon: Jade Farah MD;  Location: [facility];  Service: General      Family History   Problem Relation Age of Onset    Dementia Mother     Cancer Maternal Aunt     Cancer Maternal Uncle       Social History     Tobacco Use    Smoking status: Former     Current packs/day: 0.00     Average packs/day: 0.5 packs/day for 40.0 years (20.0 ttl pk-yrs)     Types: Cigarettes     Start date: 1983     Quit date: 2023     Years since quittin.4     Passive exposure: Past    Smokeless tobacco: Never   Vaping Use    Vaping status: Never Used   Substance Use Topics    Alcohol use: Yes     Comment: socially     Drug use: Never      E-Cigarette/Vaping    E-Cigarette Use Never User       E-Cigarette/Vaping Substances    Nicotine No     THC No     CBD No     Flavoring No     Other No     Unknown No       I have reviewed and agree with the history as documented.     64-year-old female returns to the emergency department for a recheck of her Bartholin cyst which was incised and drained 2 days ago.  Her  (not present in the emergency department) is concerned that it is going to get infected because it was \"left wide open\".  She denies any additional drainage, bleeding, erythema or proximal streaking.  She does continue to have pain.  She has taken Tylenol for the pain with incomplete relief.    Additionally, the patient complains of racing thoughts, racing heart and dyspnea and states that she thinks it all just started but family present in the emergency department states that that has been going on for years.   No other complaints or systemic symptoms. No recent travel or sick contacts.    ROS: Denies f/c, HA, new or different CP or " SOB, abdominal pain, n/v/d. 12 system ROS o/w negative.          History provided by:  Patient, medical records and relative  Abscess - Complicated  Location:  Pelvis  Pelvic abscess location:  Vulva  Size:  2cm  Abscess quality: induration and painful    Abscess quality: not draining, no fluctuance, no itching, no redness, no warmth and not weeping    Red streaking: no    Duration:  2 days  Progression:  Partially resolved  Pain details:     Quality:  Throbbing    Severity:  Mild    Duration:  2 days    Timing:  Constant    Progression:  Waxing and waning  Chronicity:  Recurrent  Context: not diabetes, not immunosuppression, not injected drug use, not insect bite/sting and not skin injury    Relieved by:  None tried  Ineffective treatments:  None tried  Associated symptoms: no fever, no headaches, no nausea and no vomiting    Risk factors: prior abscess    Risk factors: no hx of MRSA        Review of Systems   Constitutional:  Negative for chills and fever.   HENT:  Negative for congestion, rhinorrhea, sore throat and trouble swallowing.    Eyes: Negative.    Respiratory:  Negative for cough, chest tightness, shortness of breath and wheezing.    Cardiovascular:  Negative for chest pain, palpitations and leg swelling.   Gastrointestinal:  Negative for abdominal pain, diarrhea, nausea and vomiting.   Genitourinary:  Negative for dysuria, flank pain, frequency and urgency.   Musculoskeletal:  Negative for back pain, neck pain and neck stiffness.   Skin:  Positive for wound (right vulvar Bartholin's cyst previously incised and drained). Negative for pallor.   Neurological:  Negative for dizziness, syncope, weakness, light-headedness, numbness and headaches.   Hematological:  Negative for adenopathy.   Psychiatric/Behavioral:  Negative for confusion. The patient is not nervous/anxious.    All other systems reviewed and are negative.          Objective       ED Triage Vitals [09/27/24 1544]   Temperature Pulse Blood  Pressure Respirations SpO2 Patient Position - Orthostatic VS   97.8 °F (36.6 °C) 60 152/96 20 99 % Sitting      Temp Source Heart Rate Source BP Location FiO2 (%) Pain Score    Oral Monitor Left arm -- --      Vitals      Date and Time Temp Pulse SpO2 Resp BP Pain Score FACES Pain Rating User   09/27/24 1544 97.8 °F (36.6 °C) 60 99 % 20 152/96 -- -- RS            Physical Exam  Vitals reviewed.   Constitutional:       General: She is not in acute distress.     Appearance: She is well-developed. She is not ill-appearing or diaphoretic.   HENT:      Head: Normocephalic and atraumatic.      Right Ear: External ear normal.      Left Ear: External ear normal.      Nose: Nose normal.      Mouth/Throat:      Mouth: Mucous membranes are moist.      Pharynx: Oropharynx is clear.   Eyes:      General: No scleral icterus.     Conjunctiva/sclera: Conjunctivae normal.      Pupils: Pupils are equal, round, and reactive to light.   Cardiovascular:      Rate and Rhythm: Normal rate and regular rhythm.      Heart sounds: Normal heart sounds. No murmur heard.  Pulmonary:      Effort: Pulmonary effort is normal. No respiratory distress.      Breath sounds: Normal breath sounds. No wheezing.   Chest:      Chest wall: No tenderness.   Musculoskeletal:         General: Tenderness (mild, right vulvar in the area of the recently drained Bartholin's cyst) present.      Cervical back: Normal range of motion and neck supple.   Skin:     General: Skin is warm and dry.      Findings: No erythema or rash.   Neurological:      General: No focal deficit present.      Mental Status: She is alert and oriented to person, place, and time.   Psychiatric:         Mood and Affect: Mood normal.         Behavior: Behavior normal.         Thought Content: Thought content normal.         Results Reviewed       None            No orders to display       Procedures    ED Medication and Procedure Management   Prior to Admission Medications   Prescriptions Last  Dose Informant Patient Reported? Taking?   acetaminophen (TYLENOL) 500 mg tablet   No No   Sig: Take 1 tablet (500 mg total) by mouth every 6 (six) hours as needed for moderate pain   acetaminophen (TYLENOL) 650 mg CR tablet   No No   Sig: Take 1 tablet (650 mg total) by mouth every 8 (eight) hours as needed for mild pain   amLODIPine (NORVASC) 10 mg tablet  Self No No   Sig: Take 1 tablet (10 mg total) by mouth daily   amLODIPine (NORVASC) 10 mg tablet   No No   Sig: Take 1 tablet (10 mg total) by mouth daily   cyclobenzaprine (FLEXERIL) 10 mg tablet  Self No No   Sig: Take 1 tablet (10 mg total) by mouth 2 (two) times a day as needed for muscle spasms   hydrOXYzine HCL (ATARAX) 25 mg tablet  Self No No   Sig: Take 1 tablet (25 mg total) by mouth every 6 (six) hours   lidocaine (Lidoderm) 5 %   No No   Sig: Apply 1 patch topically over 12 hours daily Remove & Discard patch within 12 hours or as directed by MD   rosuvastatin (CRESTOR) 10 MG tablet  Self No No   Sig: Take 1 tablet (10 mg total) by mouth daily      Facility-Administered Medications: None     Patient's Medications   Discharge Prescriptions    NAPROXEN (NAPROSYN) 500 MG TABLET    Take 1 tablet (500 mg total) by mouth 2 (two) times a day with meals for 10 days       Start Date: 9/27/2024 End Date: 10/7/2024       Order Dose: 500 mg       Quantity: 20 tablet    Refills: 0     No discharge procedures on file.  ED SEPSIS DOCUMENTATION   Time reflects when diagnosis was documented in both MDM as applicable and the Disposition within this note       Time User Action Codes Description Comment    9/27/2024  4:11 PM Donn Light [Z48.89] Encounter for post surgical wound check     9/27/2024  4:12 PM Donn Light [N75.0] Bartholin's cyst                  Donn Light DO  09/27/24 0213

## 2024-10-02 ENCOUNTER — OFFICE VISIT (OUTPATIENT)
Dept: OBGYN CLINIC | Facility: CLINIC | Age: 64
End: 2024-10-02

## 2024-10-02 VITALS
DIASTOLIC BLOOD PRESSURE: 88 MMHG | BODY MASS INDEX: 31.54 KG/M2 | HEART RATE: 56 BPM | HEIGHT: 63 IN | WEIGHT: 178 LBS | SYSTOLIC BLOOD PRESSURE: 176 MMHG

## 2024-10-02 DIAGNOSIS — Z12.31 ENCOUNTER FOR SCREENING MAMMOGRAM FOR MALIGNANT NEOPLASM OF BREAST: ICD-10-CM

## 2024-10-02 DIAGNOSIS — N75.0 BARTHOLIN CYST: Primary | ICD-10-CM

## 2024-10-02 PROCEDURE — 99202 OFFICE O/P NEW SF 15 MIN: CPT | Performed by: NURSE PRACTITIONER

## 2024-10-02 NOTE — PATIENT INSTRUCTIONS
Schedule mammogram after 11/17/204  Call with needs or concerns  Schedule Family Practice appointment to follow up elevated BP

## 2024-10-07 ENCOUNTER — APPOINTMENT (OUTPATIENT)
Dept: LAB | Facility: CLINIC | Age: 64
End: 2024-10-07
Payer: COMMERCIAL

## 2024-10-07 DIAGNOSIS — E11.9 TYPE 2 DIABETES MELLITUS WITHOUT COMPLICATION, UNSPECIFIED WHETHER LONG TERM INSULIN USE (HCC): ICD-10-CM

## 2024-10-07 DIAGNOSIS — E78.2 MIXED HYPERLIPIDEMIA: ICD-10-CM

## 2024-10-07 DIAGNOSIS — R41.82 ALTERED MENTAL STATUS, UNSPECIFIED ALTERED MENTAL STATUS TYPE: ICD-10-CM

## 2024-10-07 LAB
ALBUMIN SERPL BCG-MCNC: 4.5 G/DL (ref 3.5–5)
ALP SERPL-CCNC: 62 U/L (ref 34–104)
ALT SERPL W P-5'-P-CCNC: 15 U/L (ref 7–52)
ANION GAP SERPL CALCULATED.3IONS-SCNC: 11 MMOL/L (ref 4–13)
AST SERPL W P-5'-P-CCNC: 17 U/L (ref 13–39)
BASOPHILS # BLD AUTO: 0.03 THOUSANDS/ΜL (ref 0–0.1)
BASOPHILS NFR BLD AUTO: 1 % (ref 0–1)
BILIRUB SERPL-MCNC: 0.48 MG/DL (ref 0.2–1)
BUN SERPL-MCNC: 10 MG/DL (ref 5–25)
CALCIUM SERPL-MCNC: 9.4 MG/DL (ref 8.4–10.2)
CHLORIDE SERPL-SCNC: 105 MMOL/L (ref 96–108)
CHOLEST SERPL-MCNC: 235 MG/DL
CO2 SERPL-SCNC: 26 MMOL/L (ref 21–32)
CREAT SERPL-MCNC: 1.07 MG/DL (ref 0.6–1.3)
EOSINOPHIL # BLD AUTO: 0.03 THOUSAND/ΜL (ref 0–0.61)
EOSINOPHIL NFR BLD AUTO: 1 % (ref 0–6)
ERYTHROCYTE [DISTWIDTH] IN BLOOD BY AUTOMATED COUNT: 15.7 % (ref 11.6–15.1)
GFR SERPL CREATININE-BSD FRML MDRD: 54 ML/MIN/1.73SQ M
GLUCOSE P FAST SERPL-MCNC: 89 MG/DL (ref 65–99)
HCT VFR BLD AUTO: 36.8 % (ref 34.8–46.1)
HDLC SERPL-MCNC: 51 MG/DL
HGB BLD-MCNC: 12.1 G/DL (ref 11.5–15.4)
IMM GRANULOCYTES # BLD AUTO: 0.02 THOUSAND/UL (ref 0–0.2)
IMM GRANULOCYTES NFR BLD AUTO: 0 % (ref 0–2)
LDLC SERPL CALC-MCNC: 161 MG/DL (ref 0–100)
LYMPHOCYTES # BLD AUTO: 2.19 THOUSANDS/ΜL (ref 0.6–4.47)
LYMPHOCYTES NFR BLD AUTO: 35 % (ref 14–44)
MCH RBC QN AUTO: 29 PG (ref 26.8–34.3)
MCHC RBC AUTO-ENTMCNC: 32.9 G/DL (ref 31.4–37.4)
MCV RBC AUTO: 88 FL (ref 82–98)
MONOCYTES # BLD AUTO: 0.39 THOUSAND/ΜL (ref 0.17–1.22)
MONOCYTES NFR BLD AUTO: 6 % (ref 4–12)
NEUTROPHILS # BLD AUTO: 3.54 THOUSANDS/ΜL (ref 1.85–7.62)
NEUTS SEG NFR BLD AUTO: 57 % (ref 43–75)
NONHDLC SERPL-MCNC: 184 MG/DL
NRBC BLD AUTO-RTO: 0 /100 WBCS
PLATELET # BLD AUTO: 393 THOUSANDS/UL (ref 149–390)
PMV BLD AUTO: 10.2 FL (ref 8.9–12.7)
POTASSIUM SERPL-SCNC: 3.8 MMOL/L (ref 3.5–5.3)
PROT SERPL-MCNC: 7.6 G/DL (ref 6.4–8.4)
RBC # BLD AUTO: 4.17 MILLION/UL (ref 3.81–5.12)
SODIUM SERPL-SCNC: 142 MMOL/L (ref 135–147)
TRIGL SERPL-MCNC: 114 MG/DL
TSH SERPL DL<=0.05 MIU/L-ACNC: 2.57 UIU/ML (ref 0.45–4.5)
WBC # BLD AUTO: 6.2 THOUSAND/UL (ref 4.31–10.16)

## 2024-10-07 PROCEDURE — 80061 LIPID PANEL: CPT

## 2024-10-07 PROCEDURE — 36415 COLL VENOUS BLD VENIPUNCTURE: CPT

## 2024-10-07 PROCEDURE — 85025 COMPLETE CBC W/AUTO DIFF WBC: CPT

## 2024-10-07 PROCEDURE — 80053 COMPREHEN METABOLIC PANEL: CPT

## 2024-10-07 PROCEDURE — 84443 ASSAY THYROID STIM HORMONE: CPT

## 2024-10-07 PROCEDURE — 83036 HEMOGLOBIN GLYCOSYLATED A1C: CPT

## 2024-10-08 LAB
EST. AVERAGE GLUCOSE BLD GHB EST-MCNC: 117 MG/DL
HBA1C MFR BLD: 5.7 %

## 2024-10-27 ENCOUNTER — APPOINTMENT (EMERGENCY)
Dept: CT IMAGING | Facility: HOSPITAL | Age: 64
End: 2024-10-27
Payer: COMMERCIAL

## 2024-10-27 ENCOUNTER — HOSPITAL ENCOUNTER (EMERGENCY)
Facility: HOSPITAL | Age: 64
Discharge: HOME | End: 2024-10-27
Attending: EMERGENCY MEDICINE
Payer: COMMERCIAL

## 2024-10-27 ENCOUNTER — APPOINTMENT (EMERGENCY)
Dept: RADIOLOGY | Facility: HOSPITAL | Age: 64
End: 2024-10-27
Payer: COMMERCIAL

## 2024-10-27 VITALS
SYSTOLIC BLOOD PRESSURE: 152 MMHG | OXYGEN SATURATION: 96 % | DIASTOLIC BLOOD PRESSURE: 91 MMHG | BODY MASS INDEX: 35.34 KG/M2 | WEIGHT: 199.52 LBS | RESPIRATION RATE: 16 BRPM | HEART RATE: 57 BPM

## 2024-10-27 DIAGNOSIS — R90.89 ABNORMAL BRAIN CT: ICD-10-CM

## 2024-10-27 DIAGNOSIS — R55 SYNCOPE: Primary | ICD-10-CM

## 2024-10-27 LAB
ALBUMIN SERPL BCG-MCNC: 4.5 G/DL (ref 3.5–5)
ALP SERPL-CCNC: 54 U/L (ref 34–104)
ALT SERPL W P-5'-P-CCNC: 8 U/L (ref 7–52)
ANION GAP SERPL CALCULATED.3IONS-SCNC: 10 MMOL/L (ref 4–13)
AST SERPL W P-5'-P-CCNC: 15 U/L (ref 13–39)
ATRIAL RATE: 59 BPM
BASOPHILS # BLD AUTO: 0.04 THOUSANDS/ΜL (ref 0–0.1)
BASOPHILS NFR BLD AUTO: 1 % (ref 0–1)
BILIRUB SERPL-MCNC: 0.56 MG/DL (ref 0.2–1)
BUN SERPL-MCNC: 7 MG/DL (ref 5–25)
CALCIUM SERPL-MCNC: 9.7 MG/DL (ref 8.4–10.2)
CARDIAC TROPONIN I PNL SERPL HS: 3 NG/L
CHLORIDE SERPL-SCNC: 106 MMOL/L (ref 96–108)
CO2 SERPL-SCNC: 22 MMOL/L (ref 21–32)
CREAT SERPL-MCNC: 1.18 MG/DL (ref 0.6–1.3)
EOSINOPHIL # BLD AUTO: 0.02 THOUSAND/ΜL (ref 0–0.61)
EOSINOPHIL NFR BLD AUTO: 0 % (ref 0–6)
ERYTHROCYTE [DISTWIDTH] IN BLOOD BY AUTOMATED COUNT: 15 % (ref 11.6–15.1)
GFR SERPL CREATININE-BSD FRML MDRD: 48 ML/MIN/1.73SQ M
GLUCOSE SERPL-MCNC: 147 MG/DL (ref 65–140)
GLUCOSE SERPL-MCNC: 155 MG/DL (ref 65–140)
HCT VFR BLD AUTO: 36.2 % (ref 34.8–46.1)
HGB BLD-MCNC: 12.7 G/DL (ref 11.5–15.4)
IMM GRANULOCYTES # BLD AUTO: 0.02 THOUSAND/UL (ref 0–0.2)
IMM GRANULOCYTES NFR BLD AUTO: 0 % (ref 0–2)
LYMPHOCYTES # BLD AUTO: 2.17 THOUSANDS/ΜL (ref 0.6–4.47)
LYMPHOCYTES NFR BLD AUTO: 36 % (ref 14–44)
MCH RBC QN AUTO: 30 PG (ref 26.8–34.3)
MCHC RBC AUTO-ENTMCNC: 35.1 G/DL (ref 31.4–37.4)
MCV RBC AUTO: 86 FL (ref 82–98)
MONOCYTES # BLD AUTO: 0.26 THOUSAND/ΜL (ref 0.17–1.22)
MONOCYTES NFR BLD AUTO: 4 % (ref 4–12)
NEUTROPHILS # BLD AUTO: 3.54 THOUSANDS/ΜL (ref 1.85–7.62)
NEUTS SEG NFR BLD AUTO: 59 % (ref 43–75)
NRBC BLD AUTO-RTO: 0 /100 WBCS
P AXIS: 47 DEGREES
PLATELET # BLD AUTO: 238 THOUSANDS/UL (ref 149–390)
PMV BLD AUTO: 11.1 FL (ref 8.9–12.7)
POTASSIUM SERPL-SCNC: 3.7 MMOL/L (ref 3.5–5.3)
PR INTERVAL: 154 MS
PROT SERPL-MCNC: 7.6 G/DL (ref 6.4–8.4)
QRS AXIS: 43 DEGREES
QRSD INTERVAL: 70 MS
QT INTERVAL: 444 MS
QTC INTERVAL: 439 MS
RBC # BLD AUTO: 4.23 MILLION/UL (ref 3.81–5.12)
SODIUM SERPL-SCNC: 138 MMOL/L (ref 135–147)
T WAVE AXIS: 95 DEGREES
VENTRICULAR RATE: 59 BPM
WBC # BLD AUTO: 6.05 THOUSAND/UL (ref 4.31–10.16)

## 2024-10-27 PROCEDURE — 99284 EMERGENCY DEPT VISIT MOD MDM: CPT

## 2024-10-27 PROCEDURE — 82948 REAGENT STRIP/BLOOD GLUCOSE: CPT

## 2024-10-27 PROCEDURE — 94640 AIRWAY INHALATION TREATMENT: CPT

## 2024-10-27 PROCEDURE — 99285 EMERGENCY DEPT VISIT HI MDM: CPT | Performed by: PHYSICIAN ASSISTANT

## 2024-10-27 PROCEDURE — 96374 THER/PROPH/DIAG INJ IV PUSH: CPT

## 2024-10-27 PROCEDURE — 84484 ASSAY OF TROPONIN QUANT: CPT | Performed by: PHYSICIAN ASSISTANT

## 2024-10-27 PROCEDURE — 93005 ELECTROCARDIOGRAM TRACING: CPT

## 2024-10-27 PROCEDURE — 70450 CT HEAD/BRAIN W/O DYE: CPT

## 2024-10-27 PROCEDURE — 36415 COLL VENOUS BLD VENIPUNCTURE: CPT | Performed by: PHYSICIAN ASSISTANT

## 2024-10-27 PROCEDURE — 85025 COMPLETE CBC W/AUTO DIFF WBC: CPT | Performed by: PHYSICIAN ASSISTANT

## 2024-10-27 PROCEDURE — 93010 ELECTROCARDIOGRAM REPORT: CPT | Performed by: INTERNAL MEDICINE

## 2024-10-27 PROCEDURE — 80053 COMPREHEN METABOLIC PANEL: CPT | Performed by: PHYSICIAN ASSISTANT

## 2024-10-27 PROCEDURE — 71045 X-RAY EXAM CHEST 1 VIEW: CPT

## 2024-10-27 RX ORDER — SODIUM CHLORIDE FOR INHALATION 0.9 %
3 VIAL, NEBULIZER (ML) INHALATION ONCE
Status: COMPLETED | OUTPATIENT
Start: 2024-10-27 | End: 2024-10-27

## 2024-10-27 RX ORDER — ALBUTEROL SULFATE 0.83 MG/ML
2.5 SOLUTION RESPIRATORY (INHALATION) ONCE
Status: COMPLETED | OUTPATIENT
Start: 2024-10-27 | End: 2024-10-27

## 2024-10-27 RX ORDER — ONDANSETRON 2 MG/ML
4 INJECTION INTRAMUSCULAR; INTRAVENOUS ONCE
Status: COMPLETED | OUTPATIENT
Start: 2024-10-27 | End: 2024-10-27

## 2024-10-27 RX ADMIN — ONDANSETRON 4 MG: 2 INJECTION INTRAMUSCULAR; INTRAVENOUS at 15:02

## 2024-10-27 RX ADMIN — ALBUTEROL SULFATE 2.5 MG: 2.5 SOLUTION RESPIRATORY (INHALATION) at 14:49

## 2024-10-27 RX ADMIN — ISODIUM CHLORIDE 3 ML: 0.03 SOLUTION RESPIRATORY (INHALATION) at 14:49

## 2024-10-27 NOTE — ED PROVIDER NOTES
Time reflects when diagnosis was documented in both MDM as applicable and the Disposition within this note       Time User Action Codes Description Comment    10/27/2024  4:14 PM Lizet Banda Add [R55] Syncope     10/27/2024  4:14 PM Lizet Banda Add [R90.89] Abnormal brain CT     10/27/2024  4:15 PM Solo Lizet Modify [R90.89] Abnormal brain CT Moderate chronic white matter microangiopathic changes again demonstrated.   Worsening asymmetric atrophy/volume loss involving the left frontal, and anterior left temporal lobes in particular, with associated ex vacuo dilatation of the ventricles, like represents asymmetric frontotemporal dementia          ED Disposition       ED Disposition   Discharge    Condition   Good    Date/Time   Sun Oct 27, 2024  4:14 PM    Comment   Latoya Lewis discharge to home/self care.                   Assessment & Plan       Medical Decision Making  64-year-old female presents today after a syncopal episode which occurred after she was taking a bath and reportedly had bleach poured into the bath instead of bubble bath.  She arrives conscious alert oriented without complaints, due to the potential for chemical bronchial irritation albuterol was initiated, chest x-ray obtained and blood work to evaluate for potential other alternative causes for syncopal episode initiated.    Workup demonstrates no blood work abnormalities, organ dysfunction, evidence of ACS, EKG is without ischemic changes or malignant dysrhythmia.  CT demonstrates evidence of likely frontotemporal dementia -patient is conscious alert oriented and reports she feels improved, does live at home with her  who is able to help take care of her, patient discharged into his care and advised to follow-up with family care provider for this new diagnosis.    All imaging and/or lab testing discussed with patient, strict return to ED precautions discussed. Patient and/or family members verbalizes understanding  "and agrees with plan. Patient is stable for discharge     Portions of the record may have been created with voice recognition software. Occasional wrong word or \"sound a like\" substitutions may have occurred due to the inherent limitations of voice recognition software. Read the chart carefully and recognize, using context, where substitutions have occurred.        Amount and/or Complexity of Data Reviewed  Labs: ordered.  Radiology: ordered and independent interpretation performed.    Risk  Prescription drug management.        ED Course as of 10/27/24 1618   Sun Oct 27, 2024   1612   No acute intracranial abnormality. Moderate chronic white matter microangiopathic changes again demonstrated.     Worsening asymmetric atrophy/volume loss involving the left frontal, and anterior left temporal lobes in particular, with associated ex vacuo dilatation of the ventricles, which like represents an asymmetric frontotemporal dementia/symmetric aphasia.   Correlation with the patient's neuropsychological evaluation findings is recommended.     1612 Patient was reexamined at this time and informed of CT imaging findings, this demonstrated finding does explain the patient's presentation and patient was reassured/comforted by having a cause for her pouring bleach into her bath today.  She reports she has not been diagnosed with this in the past,  will be called to pick the patient up as she feels improved and is otherwise stable for discharge.       Medications   ondansetron (ZOFRAN) injection 4 mg (4 mg Intravenous Given 10/27/24 1502)   albuterol inhalation solution 2.5 mg (2.5 mg Nebulization Given 10/27/24 1449)   sodium chloride 0.9 % inhalation solution 3 mL (3 mL Nebulization Given 10/27/24 1449)       ED Risk Strat Scores                           SBIRT 20yo+      Flowsheet Row Most Recent Value   Initial Alcohol Screen: US AUDIT-C     1. How often do you have a drink containing alcohol? 0 Filed at: 10/27/2024 1444 "   2. How many drinks containing alcohol do you have on a typical day you are drinking?  0 Filed at: 10/27/2024 1444   3b. FEMALE Any Age, or MALE 65+: How often do you have 4 or more drinks on one occassion? 0 Filed at: 10/27/2024 1444   Audit-C Score 0 Filed at: 10/27/2024 1444   VANIA: How many times in the past year have you...    Used an illegal drug or used a prescription medication for non-medical reasons? Never Filed at: 10/27/2024 144                            History of Present Illness       Chief Complaint   Patient presents with    Syncope     Patient brought in by EMS.  Patient was cleaning bathtub with bleach and was overcome by fumes.  Patient reports LOC.  Patient reports hitting her head during syncopal episode.       Past Medical History:   Diagnosis Date    Anxiety     Chronic pain     back pain     Depression 2024    Diabetes mellitus (HCC)     Disease of thyroid gland 2024    hypothyroid     Hyperlipidemia     Hypertension     Psychiatric disorder 2024      Past Surgical History:   Procedure Laterality Date    DENTAL SURGERY      HYSTERECTOMY      over 20 years ago    NE EXCISION TUMOR SOFT TIS BACK/FLANK SUBQ 3 CM/> Right 2024    Procedure: UPPER BACK EXCISION  BX LESION/MASS;  Surgeon: Jade Farah MD;  Location: [facility];  Service: General      Family History   Problem Relation Age of Onset    Dementia Mother     Cancer Maternal Aunt     Cancer Maternal Uncle       Social History     Tobacco Use    Smoking status: Former     Current packs/day: 0.00     Average packs/day: 0.5 packs/day for 40.0 years (20.0 ttl pk-yrs)     Types: Cigarettes     Start date: 1983     Quit date: 2023     Years since quittin.4     Passive exposure: Past    Smokeless tobacco: Never   Vaping Use    Vaping status: Never Used   Substance Use Topics    Alcohol use: Yes     Comment: socially     Drug use: Never      E-Cigarette/Vaping    E-Cigarette Use Never User        E-Cigarette/Vaping Substances    Nicotine No     THC No     CBD No     Flavoring No     Other No     Unknown No       I have reviewed and agree with the history as documented.     64-year-old female presents today for evaluation after a syncopal event she reports that she was running a bath and EMS reports the bath contained bleach, patient reports she does not reporting bleach into the bath and reports she believes it was bubble bath this was not an attempt to harm herself she denies any suicidality.  Patient reports a syncopal episode during which she struck her head, she reports no chest pain palpitations.  She reports a history of asthma.       Syncope  Associated symptoms: no chest pain, no dizziness, no fever, no nausea, no palpitations, no shortness of breath and no vomiting        Review of Systems   Constitutional:  Negative for chills, fatigue and fever.   HENT:  Negative for congestion, ear pain, rhinorrhea and sore throat.    Eyes:  Negative for redness.   Respiratory:  Negative for chest tightness and shortness of breath.    Cardiovascular:  Positive for syncope. Negative for chest pain and palpitations.   Gastrointestinal:  Negative for abdominal pain, nausea and vomiting.   Genitourinary:  Negative for dysuria and hematuria.   Musculoskeletal: Negative.    Skin:  Negative for rash.   Neurological:  Positive for syncope. Negative for dizziness, light-headedness and numbness.           Objective       ED Triage Vitals [10/27/24 1442]   Temp Pulse Blood Pressure Respirations SpO2 Patient Position - Orthostatic VS   -- 60 131/79 16 96 % Lying      Temp src Heart Rate Source BP Location FiO2 (%) Pain Score    -- Monitor Left arm -- --      Vitals      Date and Time Temp Pulse SpO2 Resp BP Pain Score FACES Pain Rating User   10/27/24 1442 -- 60 96 % 16 131/79 -- -- LH            Physical Exam  Vitals and nursing note reviewed.   Constitutional:       Appearance: She is well-developed.   HENT:      Head:  Normocephalic.      Ears:      Comments: Exam is negative for hemotympanum no septal hematoma visualized. No  Signs of basilar skull fracture including periorbital ecchymosis and periauricular ecchymosis.  No crepitus, deformities, depressions of the skull were palpated.  Eyes:      General: No scleral icterus.  Cardiovascular:      Rate and Rhythm: Normal rate and regular rhythm.   Pulmonary:      Effort: Pulmonary effort is normal.      Breath sounds: Normal breath sounds. No stridor.      Comments:  Patient in no respiratory distress, speaking in full sentences, managing oral secretions without difficulty, no accessory muscle use, retractions, or belly breathing noted, no adventitious lung sounds auscultated bilaterally.  Abdominal:      General: There is no distension.      Palpations: Abdomen is soft.      Tenderness: There is no abdominal tenderness.   Musculoskeletal:         General: Normal range of motion.   Skin:     General: Skin is warm and dry.      Capillary Refill: Capillary refill takes less than 2 seconds.   Neurological:      General: No focal deficit present.      Mental Status: She is alert and oriented to person, place, and time.      Cranial Nerves: No cranial nerve deficit.         Results Reviewed       Procedure Component Value Units Date/Time    HS Troponin 0hr (reflex protocol) [[id number]]  (Normal) Collected: 10/27/24 1459    Lab Status: Final result Specimen: Blood from Arm, Right Updated: 10/27/24 1529     hs TnI 0hr 3 ng/L     Comprehensive metabolic panel [[id number]]  (Abnormal) Collected: 10/27/24 1459    Lab Status: Final result Specimen: Blood from Arm, Right Updated: 10/27/24 1524     Sodium 138 mmol/L      Potassium 3.7 mmol/L      Chloride 106 mmol/L      CO2 22 mmol/L      ANION GAP 10 mmol/L      BUN 7 mg/dL      Creatinine 1.18 mg/dL      Glucose 147 mg/dL      Calcium 9.7 mg/dL      AST 15 U/L      ALT 8 U/L      Alkaline Phosphatase 54 U/L      Total Protein 7.6 g/dL       Albumin 4.5 g/dL      Total Bilirubin 0.56 mg/dL      eGFR 48 ml/min/1.73sq m     Narrative:      [employer] guidelines for Chronic Kidney Disease (CKD):     Stage 1 with normal or high GFR (GFR > 90 mL/min/1.73 square meters)    Stage 2 Mild CKD (GFR = 60-89 mL/min/1.73 square meters)    Stage 3A Moderate CKD (GFR = 45-59 mL/min/1.73 square meters)    Stage 3B Moderate CKD (GFR = 30-44 mL/min/1.73 square meters)    Stage 4 Severe CKD (GFR = 15-29 mL/min/1.73 square meters)    Stage 5 End Stage CKD (GFR <15 mL/min/1.73 square meters)  Note: GFR calculation is accurate only with a steady state creatinine    CBC and differential [[id number]] Collected: 10/27/24 1459    Lab Status: Final result Specimen: Blood from Arm, Right Updated: 10/27/24 1509     WBC 6.05 Thousand/uL      RBC 4.23 Million/uL      Hemoglobin 12.7 g/dL      Hematocrit 36.2 %      MCV 86 fL      MCH 30.0 pg      MCHC 35.1 g/dL      RDW 15.0 %      MPV 11.1 fL      Platelets 238 Thousands/uL      nRBC 0 /100 WBCs      Segmented % 59 %      Immature Grans % 0 %      Lymphocytes % 36 %      Monocytes % 4 %      Eosinophils Relative 0 %      Basophils Relative 1 %      Absolute Neutrophils 3.54 Thousands/µL      Absolute Immature Grans 0.02 Thousand/uL      Absolute Lymphocytes 2.17 Thousands/µL      Absolute Monocytes 0.26 Thousand/µL      Eosinophils Absolute 0.02 Thousand/µL      Basophils Absolute 0.04 Thousands/µL     Fingerstick Glucose (POCT) [[id number]]  (Abnormal) Collected: 10/27/24 1444    Lab Status: Final result Specimen: Blood Updated: 10/27/24 1445     POC Glucose 155 mg/dl             CT head without contrast   Final Interpretation by Glo Humphrey MD (10/27 1604)      No acute intracranial abnormality. Moderate chronic white matter microangiopathic changes again demonstrated.      Worsening asymmetric atrophy/volume loss involving the left frontal, and anterior left temporal lobes in particular, with associated ex  vacuo dilatation of the ventricles, which like represents an asymmetric frontotemporal dementia/symmetric aphasia.    Correlation with the patient's neuropsychological evaluation findings is recommended.      The study was marked in EPIC for immediate notification.                  Workstation performed: [id number]         XR chest portable   ED Interpretation by Lizet Banda PA-C (10/27 0858)   Acute cardiopulmonary abnormalities identified          ECG 12 Lead Documentation Only    Date/Time: 10/27/2024 2:53 PM    Performed by: Lizet Banda PA-C  Authorized by: Lizet Banda PA-C    Indications / Diagnosis:  Syncope  ECG reviewed by me, the ED Provider: yes    Patient location:  ED  Previous ECG:     Comparison to cardiac monitor: Yes    Interpretation:     Interpretation: normal    Rate:     ECG rate:  59    ECG rate assessment: bradycardic    Rhythm:     Rhythm: sinus rhythm    Ectopy:     Ectopy: none    QRS:     QRS axis:  Normal    QRS intervals:  Normal  Conduction:     Conduction: normal    ST segments:     ST segments:  Normal  T waves:     T waves: normal    Comments:        QRS 70          ED Medication and Procedure Management   Prior to Admission Medications   Prescriptions Last Dose Informant Patient Reported? Taking?   acetaminophen (TYLENOL) 500 mg tablet   No No   Sig: Take 1 tablet (500 mg total) by mouth every 6 (six) hours as needed for moderate pain   Patient not taking: Reported on 10/2/2024   acetaminophen (TYLENOL) 650 mg CR tablet   No No   Sig: Take 1 tablet (650 mg total) by mouth every 8 (eight) hours as needed for mild pain   amLODIPine (NORVASC) 10 mg tablet  Self No No   Sig: Take 1 tablet (10 mg total) by mouth daily   Patient not taking: Reported on 10/2/2024   amLODIPine (NORVASC) 10 mg tablet   No No   Sig: Take 1 tablet (10 mg total) by mouth daily   Patient not taking: Reported on 10/2/2024   cyclobenzaprine (FLEXERIL) 10  mg tablet  Self No No   Sig: Take 1 tablet (10 mg total) by mouth 2 (two) times a day as needed for muscle spasms   Patient not taking: Reported on 10/2/2024   hydrOXYzine HCL (ATARAX) 25 mg tablet  Self No No   Sig: Take 1 tablet (25 mg total) by mouth every 6 (six) hours   lidocaine (Lidoderm) 5 %   No No   Sig: Apply 1 patch topically over 12 hours daily Remove & Discard patch within 12 hours or as directed by MD   Patient not taking: Reported on 10/2/2024   naproxen (NAPROSYN) 500 mg tablet   No No   Sig: Take 1 tablet (500 mg total) by mouth 2 (two) times a day with meals for 10 days   rosuvastatin (CRESTOR) 10 MG tablet  Self No No   Sig: Take 1 tablet (10 mg total) by mouth daily   Patient not taking: Reported on 10/2/2024      Facility-Administered Medications: None     Patient's Medications   Discharge Prescriptions    No medications on file       ED SEPSIS DOCUMENTATION   Time reflects when diagnosis was documented in both MDM as applicable and the Disposition within this note       Time User Action Codes Description Comment    10/27/2024  4:14 PM Lizet Banda [R55] Syncope     10/27/2024  4:14 PM Lizet Banda Add [R90.89] Abnormal brain CT     10/27/2024  4:15 PM Lizet Banda Modify [R90.89] Abnormal brain CT Moderate chronic white matter microangiopathic changes again demonstrated.   Worsening asymmetric atrophy/volume loss involving the left frontal, and anterior left temporal lobes in particular, with associated ex vacuo dilatation of the ventricles, like represents asymmetric frontotemporal dementia                 Lizet Banda PA-C  10/27/24 2148

## 2024-11-04 ENCOUNTER — TELEPHONE (OUTPATIENT)
Dept: FAMILY MEDICINE CLINIC | Facility: CLINIC | Age: 64
End: 2024-11-04

## 2024-11-04 DIAGNOSIS — R41.3 MEMORY DEFICIT: Primary | ICD-10-CM

## 2024-11-04 NOTE — TELEPHONE ENCOUNTER
Patient is scheduled for an MRI tomorrow, however referral attached is from 2022. Can someone please place a new order for us to attach to appointment? Thank you!

## 2024-11-07 ENCOUNTER — APPOINTMENT (EMERGENCY)
Dept: RADIOLOGY | Facility: HOSPITAL | Age: 64
End: 2024-11-07
Payer: COMMERCIAL

## 2024-11-07 ENCOUNTER — HOSPITAL ENCOUNTER (EMERGENCY)
Facility: HOSPITAL | Age: 64
Discharge: HOME | End: 2024-11-07
Payer: COMMERCIAL

## 2024-11-07 VITALS
HEART RATE: 48 BPM | DIASTOLIC BLOOD PRESSURE: 100 MMHG | OXYGEN SATURATION: 97 % | BODY MASS INDEX: 31.52 KG/M2 | SYSTOLIC BLOOD PRESSURE: 176 MMHG | WEIGHT: 177.91 LBS | RESPIRATION RATE: 18 BRPM | TEMPERATURE: 98 F

## 2024-11-07 DIAGNOSIS — R07.9 CHEST PAIN, UNSPECIFIED TYPE: Primary | ICD-10-CM

## 2024-11-07 LAB
ANION GAP SERPL CALCULATED.3IONS-SCNC: 8 MMOL/L (ref 4–13)
ATRIAL RATE: 44 BPM
ATRIAL RATE: 55 BPM
BASOPHILS # BLD AUTO: 0.04 THOUSANDS/ÂΜL (ref 0–0.1)
BASOPHILS NFR BLD AUTO: 1 % (ref 0–1)
BUN SERPL-MCNC: 13 MG/DL (ref 5–25)
CALCIUM SERPL-MCNC: 9.8 MG/DL (ref 8.4–10.2)
CARDIAC TROPONIN I PNL SERPL HS: <2 NG/L
CARDIAC TROPONIN I PNL SERPL HS: <2 NG/L
CHLORIDE SERPL-SCNC: 106 MMOL/L (ref 96–108)
CO2 SERPL-SCNC: 26 MMOL/L (ref 21–32)
CREAT SERPL-MCNC: 1.06 MG/DL (ref 0.6–1.3)
EOSINOPHIL # BLD AUTO: 0.03 THOUSAND/ÂΜL (ref 0–0.61)
EOSINOPHIL NFR BLD AUTO: 1 % (ref 0–6)
ERYTHROCYTE [DISTWIDTH] IN BLOOD BY AUTOMATED COUNT: 15.2 % (ref 11.6–15.1)
GFR SERPL CREATININE-BSD FRML MDRD: 55 ML/MIN/1.73SQ M
GLUCOSE SERPL-MCNC: 67 MG/DL (ref 65–140)
HCT VFR BLD AUTO: 36.8 % (ref 34.8–46.1)
HGB BLD-MCNC: 12.3 G/DL (ref 11.5–15.4)
IMM GRANULOCYTES # BLD AUTO: 0.02 THOUSAND/UL (ref 0–0.2)
IMM GRANULOCYTES NFR BLD AUTO: 0 % (ref 0–2)
LYMPHOCYTES # BLD AUTO: 1.83 THOUSANDS/ÂΜL (ref 0.6–4.47)
LYMPHOCYTES NFR BLD AUTO: 32 % (ref 14–44)
MCH RBC QN AUTO: 29.6 PG (ref 26.8–34.3)
MCHC RBC AUTO-ENTMCNC: 33.4 G/DL (ref 31.4–37.4)
MCV RBC AUTO: 89 FL (ref 82–98)
MONOCYTES # BLD AUTO: 0.38 THOUSAND/ÂΜL (ref 0.17–1.22)
MONOCYTES NFR BLD AUTO: 7 % (ref 4–12)
NEUTROPHILS # BLD AUTO: 3.48 THOUSANDS/ÂΜL (ref 1.85–7.62)
NEUTS SEG NFR BLD AUTO: 59 % (ref 43–75)
NRBC BLD AUTO-RTO: 0 /100 WBCS
P AXIS: 26 DEGREES
P AXIS: 38 DEGREES
PLATELET # BLD AUTO: 326 THOUSANDS/UL (ref 149–390)
PMV BLD AUTO: 10.1 FL (ref 8.9–12.7)
POTASSIUM SERPL-SCNC: 3.7 MMOL/L (ref 3.5–5.3)
PR INTERVAL: 158 MS
PR INTERVAL: 162 MS
QRS AXIS: 18 DEGREES
QRS AXIS: 29 DEGREES
QRSD INTERVAL: 66 MS
QRSD INTERVAL: 68 MS
QT INTERVAL: 454 MS
QT INTERVAL: 508 MS
QTC INTERVAL: 435 MS
QTC INTERVAL: 435 MS
RBC # BLD AUTO: 4.15 MILLION/UL (ref 3.81–5.12)
SODIUM SERPL-SCNC: 140 MMOL/L (ref 135–147)
T WAVE AXIS: 108 DEGREES
T WAVE AXIS: 122 DEGREES
VENTRICULAR RATE: 44 BPM
VENTRICULAR RATE: 55 BPM
WBC # BLD AUTO: 5.78 THOUSAND/UL (ref 4.31–10.16)

## 2024-11-07 PROCEDURE — 36415 COLL VENOUS BLD VENIPUNCTURE: CPT

## 2024-11-07 PROCEDURE — 99285 EMERGENCY DEPT VISIT HI MDM: CPT

## 2024-11-07 PROCEDURE — 96374 THER/PROPH/DIAG INJ IV PUSH: CPT

## 2024-11-07 PROCEDURE — 93010 ELECTROCARDIOGRAM REPORT: CPT

## 2024-11-07 PROCEDURE — 71045 X-RAY EXAM CHEST 1 VIEW: CPT

## 2024-11-07 PROCEDURE — 93005 ELECTROCARDIOGRAM TRACING: CPT

## 2024-11-07 PROCEDURE — 84484 ASSAY OF TROPONIN QUANT: CPT

## 2024-11-07 PROCEDURE — 96375 TX/PRO/DX INJ NEW DRUG ADDON: CPT

## 2024-11-07 PROCEDURE — 80048 BASIC METABOLIC PNL TOTAL CA: CPT

## 2024-11-07 PROCEDURE — 85025 COMPLETE CBC W/AUTO DIFF WBC: CPT

## 2024-11-07 RX ORDER — SODIUM CHLORIDE 9 MG/ML
3 INJECTION INTRAVENOUS
Status: DISCONTINUED | OUTPATIENT
Start: 2024-11-07 | End: 2024-11-07 | Stop reason: HOSPADM

## 2024-11-07 RX ORDER — ONDANSETRON 2 MG/ML
4 INJECTION INTRAMUSCULAR; INTRAVENOUS ONCE
Status: COMPLETED | OUTPATIENT
Start: 2024-11-07 | End: 2024-11-07

## 2024-11-07 RX ADMIN — Medication 2 G: at 13:03

## 2024-11-07 RX ADMIN — ONDANSETRON 4 MG: 2 INJECTION INTRAMUSCULAR; INTRAVENOUS at 13:03

## 2024-11-07 RX ADMIN — MORPHINE SULFATE 2 MG: 2 INJECTION, SOLUTION INTRAMUSCULAR; INTRAVENOUS at 13:03

## 2024-11-07 NOTE — ED ATTENDING ATTESTATION
11/7/2024  IDaniele MD, saw and evaluated the patient. I have discussed the patient with the resident/non-physician practitioner and agree with the resident's/non-physician practitioner's findings, Plan of Care, and MDM as documented in the resident's/non-physician practitioner's note, except where noted. All available labs and Radiology studies were reviewed.  I was present for key portions of any procedure(s) performed by the resident/non-physician practitioner and I was immediately available to provide assistance.       At this point I agree with the current assessment done in the Emergency Department.  I have conducted an independent evaluation of this patient a history and physical is as follows:      Chief Complaint   Patient presents with    Chest Pain     Localized mid chest pain, unable to describe pain. Pain started about 1hr ago, reports nausea and SOB         HPI: 63 y/o F with pmh htn, hld presenting for evaluation of acute onset substernal chest pain. Started 45 minutes PTA while at rest. Pt has difficulty explaining quality of pain. Recently dx dementia. Pt feels some shortness of breath and anxiousness. No nausea, vomiting, radiating pain. No meds taken PTA.      ROS: per resident physician note    Gen: NAD, AAOx3  HEENT: PERRL, EOMI  Neck: supple  CV: RRR, all pulses 2+  Lungs: CTA B/L  Abdomen: soft, NT/ND  Ext: no swelling or deformity  Neuro: 5/5 strength all extremities, sensation grossly intact  Skin: no rash        Critical Care Time  Procedures      MDM: 63 y/o F with acute onset chest pain. EKG with new inferolateral T wave inversions. VSS. Ddx includes ACS, anxiety, PE, ptx. HEART score is 5. ACS w/u neg in ED. Pt feeling better with stable vitals. Recommend outpatient cardiology f/u with strict RTED precautions.         ED Course  ED Course as of 11/07/24 1532   Thu Nov 07, 2024   1248 ECG 12 lead  Procedure Note: EKG  Date/Time: 11/07/24 12:48 PM   Interpreted by: Daniele  MD Juan Diego  Indications / Diagnosis: CP  ECG reviewed by me, the ED Provider: yes   The EKG demonstrates:  Rhythm: normal sinus  Intervals: normal intervals  Axis: normal axis  QRS/Blocks: normal QRS  ST Changes: No acute ST Changes, no STD/MANUEL.  T wave inversion inferolateral   1338 hs TnI 0hr: <2   1346 Updated pt and  to results, will need delta trop and repeat ekg   1459 ECG 12 lead repeat in 2hrs  Procedure Note: EKG  Date/Time: 11/07/24 2:59 PM   Interpreted by: Daniele Adamson MD  Indications / Diagnosis: CP  ECG reviewed by me, the ED Provider: yes   The EKG demonstrates:  Rhythm: normal sinus  Intervals: normal intervals  Axis: normal axis  QRS/Blocks: normal QRS  ST Changes: No acute ST Changes, no STD/MANUEL.  Still inferolateral T wave inversion          Critical Care Time  Procedures

## 2024-11-07 NOTE — ED PROVIDER NOTES
Time reflects when diagnosis was documented in both MDM as applicable and the Disposition within this note       Time User Action Codes Description Comment    11/7/2024  3:18 PM Daniele Adamson Add [R07.9] Chest pain, unspecified type           ED Disposition       ED Disposition   Discharge    Condition   Stable    Date/Time   Thu Nov 7, 2024  3:35 PM    Comment   Latoya Lewis discharge to home/self care.                   Assessment & Plan       Medical Decision Making  64-years-old female patient presenting with chest pain that started 45 minutes ago and some shortness of breath. Her  reports that the patient was recently diagnosed with dementia. All the hx was collected from her , who reports that she was seating when the pain began. The pain is describes as sharp pain in the middle of the chest that increases with deep palpation.  Differential diagnosis include: Myocardial Infarction, PE, Pneumothorax, pneumomediastinum, Osteochondritis and Anxiety.      ECG was performed and shows some changes. CXR normal findings.  ACS protocol was initiated.   Troponin's 0 and 2 hr >2. HEART score is 5. ACS w/u neg in ED.  Patient received morphine for the pain. Patient was reassessed and reports that she feels better, denies chest pain.  Recommend outpatient f/u with cardiology. Discussed precautions with patient and . Follow up with PCP as well.          Amount and/or Complexity of Data Reviewed  Independent Historian: spouse  Labs: ordered.  Radiology: ordered and independent interpretation performed.    Risk  Prescription drug management.             Medications   morphine injection 2 mg (2 mg Intravenous Given 11/7/24 1303)   ondansetron (ZOFRAN) injection 4 mg (4 mg Intravenous Given 11/7/24 1303)   Diclofenac Sodium (VOLTAREN) 1 % topical gel 2 g (2 g Topical Given 11/7/24 1303)       ED Risk Strat Scores   HEART Risk Score      Flowsheet Row Most Recent Value   Heart Score Risk Calculator     History 1 Filed at: 11/07/2024 1338   ECG 1 Filed at: 11/07/2024 1338   Age 1 Filed at: 11/07/2024 1338   Risk Factors 2 Filed at: 11/07/2024 1338   Troponin 0 Filed at: 11/07/2024 1338   HEART Score 5 Filed at: 11/07/2024 1338                               SBIRT 20yo+      Flowsheet Row Most Recent Value   Initial Alcohol Screen: US AUDIT-C     1. How often do you have a drink containing alcohol? 0 Filed at: 11/07/2024 1242   2. How many drinks containing alcohol do you have on a typical day you are drinking?  0 Filed at: 11/07/2024 1242   3a. Male UNDER 65: How often do you have five or more drinks on one occasion? 0 Filed at: 11/07/2024 1242   3b. FEMALE Any Age, or MALE 65+: How often do you have 4 or more drinks on one occassion? 0 Filed at: 11/07/2024 1242   Audit-C Score 0 Filed at: 11/07/2024 1242   VANIA: How many times in the past year have you...    Used an illegal drug or used a prescription medication for non-medical reasons? Never Filed at: 11/07/2024 1242                            History of Present Illness       Chief Complaint   Patient presents with    Chest Pain     Localized mid chest pain, unable to describe pain. Pain started about 1hr ago, reports nausea, H/A and SOB         Past Medical History:   Diagnosis Date    Anxiety     Chronic pain     back pain     Depression 1/23/2024    Diabetes mellitus (HCC)     Disease of thyroid gland 1/23/2024    hypothyroid     Hyperlipidemia     Hypertension     Psychiatric disorder 1/23/2024      Past Surgical History:   Procedure Laterality Date    DENTAL SURGERY      HYSTERECTOMY      over 20 years ago    WI EXCISION TUMOR SOFT TIS BACK/FLANK SUBQ 3 CM/> Right 1/26/2024    Procedure: UPPER BACK EXCISION  BX LESION/MASS;  Surgeon: Jade Farah MD;  Location: [facility];  Service: General      Family History   Problem Relation Age of Onset    Dementia Mother     Cancer Maternal Aunt     Cancer Maternal Uncle       Social History     Tobacco Use     Smoking status: Former     Current packs/day: 0.00     Average packs/day: 0.5 packs/day for 40.0 years (20.0 ttl pk-yrs)     Types: Cigarettes     Start date: 1983     Quit date: 2023     Years since quittin.5     Passive exposure: Past    Smokeless tobacco: Never   Vaping Use    Vaping status: Never Used   Substance Use Topics    Alcohol use: Yes     Comment: socially     Drug use: Never      E-Cigarette/Vaping    E-Cigarette Use Never User       E-Cigarette/Vaping Substances    Nicotine No     THC No     CBD No     Flavoring No     Other No     Unknown No       I have reviewed and agree with the history as documented.       Chest Pain  Associated symptoms: no abdominal pain, no back pain, no cough, no fever, no palpitations, no shortness of breath and not vomiting        Review of Systems   Constitutional:  Negative for chills and fever.   HENT:  Negative for ear pain and sore throat.    Eyes:  Negative for pain and visual disturbance.   Respiratory:  Negative for cough and shortness of breath.    Cardiovascular:  Positive for chest pain. Negative for palpitations.   Gastrointestinal:  Negative for abdominal pain and vomiting.   Genitourinary:  Negative for dysuria and hematuria.   Musculoskeletal:  Negative for arthralgias and back pain.   Skin:  Negative for color change and rash.   Neurological:  Negative for seizures and syncope.   All other systems reviewed and are negative.          Objective       ED Triage Vitals   Temperature Pulse Blood Pressure Respirations SpO2 Patient Position - Orthostatic VS   24 1432 24 1243 24 1243 24 1243 24 1243 24 1243   98 °F (36.7 °C) 57 (!) 174/99 20 97 % Sitting      Temp Source Heart Rate Source BP Location FiO2 (%) Pain Score    24 1243 24 1243 24 1243 -- 24 1243    Tympanic Monitor Left arm  10 - Worst Possible Pain      Vitals      Date and Time Temp Pulse SpO2 Resp BP Pain Score FACES Pain Rating  User   11/07/24 1541 -- 48 97 % 18 176/100 No Pain -- VB   11/07/24 1432 98 °F (36.7 °C) 48 99 % 18 162/92 -- -- TB   11/07/24 1350 -- 49 99 % 18 167/103 -- -- TB   11/07/24 1243 -- 57 97 % 20 174/99 10 - Worst Possible Pain -- TW            Physical Exam  Vitals and nursing note reviewed.   Constitutional:       General: She is not in acute distress.     Appearance: She is well-developed.   HENT:      Head: Normocephalic and atraumatic.   Eyes:      Conjunctiva/sclera: Conjunctivae normal.   Cardiovascular:      Rate and Rhythm: Normal rate and regular rhythm.      Heart sounds: Normal heart sounds. No murmur heard.     No S3 or S4 sounds.   Pulmonary:      Effort: Pulmonary effort is normal. No respiratory distress.      Breath sounds: Normal breath sounds.   Chest:      Chest wall: Tenderness present.   Abdominal:      Palpations: Abdomen is soft.      Tenderness: There is no abdominal tenderness.   Musculoskeletal:         General: No swelling.      Cervical back: Neck supple.   Skin:     General: Skin is warm and dry.      Capillary Refill: Capillary refill takes less than 2 seconds.   Neurological:      Mental Status: She is alert. She is disoriented.      Comments: Disoriented at baseline    Psychiatric:         Mood and Affect: Mood normal.         Results Reviewed       Procedure Component Value Units Date/Time    HS Troponin I 2hr [[id number]] Collected: 11/07/24 1504    Lab Status: Final result Specimen: Blood from Arm, Right Updated: 11/07/24 1535     hs TnI 2hr <2 ng/L      Delta 2hr hsTnI --    HS Troponin 0hr (reflex protocol) [[id number]]  (Normal) Collected: 11/07/24 1303    Lab Status: Final result Specimen: Blood from Arm, Left Updated: 11/07/24 1337     hs TnI 0hr <2 ng/L     Basic metabolic panel [[id number]] Collected: 11/07/24 1303    Lab Status: Final result Specimen: Blood from Arm, Left Updated: 11/07/24 1332     Sodium 140 mmol/L      Potassium 3.7 mmol/L      Chloride 106 mmol/L      CO2 26  mmol/L      ANION GAP 8 mmol/L      BUN 13 mg/dL      Creatinine 1.06 mg/dL      Glucose 67 mg/dL      Calcium 9.8 mg/dL      eGFR 55 ml/min/1.73sq m     Narrative:      National Kid[employer] guidelines for Chronic Kidney Disease (CKD):     Stage 1 with normal or high GFR (GFR > 90 mL/min/1.73 square meters)    Stage 2 Mild CKD (GFR = 60-89 mL/min/1.73 square meters)    Stage 3A Moderate CKD (GFR = 45-59 mL/min/1.73 square meters)    Stage 3B Moderate CKD (GFR = 30-44 mL/min/1.73 square meters)    Stage 4 Severe CKD (GFR = 15-29 mL/min/1.73 square meters)    Stage 5 End Stage CKD (GFR <15 mL/min/1.73 square meters)  Note: GFR calculation is accurate only with a steady state creatinine    CBC and differential [[id number]]  (Abnormal) Collected: 11/07/24 1303    Lab Status: Final result Specimen: Blood from Arm, Left Updated: 11/07/24 1316     WBC 5.78 Thousand/uL      RBC 4.15 Million/uL      Hemoglobin 12.3 g/dL      Hematocrit 36.8 %      MCV 89 fL      MCH 29.6 pg      MCHC 33.4 g/dL      RDW 15.2 %      MPV 10.1 fL      Platelets 326 Thousands/uL      nRBC 0 /100 WBCs      Segmented % 59 %      Immature Grans % 0 %      Lymphocytes % 32 %      Monocytes % 7 %      Eosinophils Relative 1 %      Basophils Relative 1 %      Absolute Neutrophils 3.48 Thousands/µL      Absolute Immature Grans 0.02 Thousand/uL      Absolute Lymphocytes 1.83 Thousands/µL      Absolute Monocytes 0.38 Thousand/µL      Eosinophils Absolute 0.03 Thousand/µL      Basophils Absolute 0.04 Thousands/µL             X-ray chest 1 view portable   ED Interpretation by Daniele Adamson MD (11/07 1321)   No acute cardiopulmonary findings per my interpretation, similar in appearance to film from 10/27/2024      Final Interpretation by Cecy Mendoza MD (11/07 1604)      No acute cardiopulmonary disease.            Workstation performed: [id number]             Procedures    ED Medication and Procedure Management   Prior to Admission  Medications   Prescriptions Last Dose Informant Patient Reported? Taking?   acetaminophen (TYLENOL) 500 mg tablet   No No   Sig: Take 1 tablet (500 mg total) by mouth every 6 (six) hours as needed for moderate pain   Patient not taking: Reported on 10/2/2024   acetaminophen (TYLENOL) 650 mg CR tablet   No No   Sig: Take 1 tablet (650 mg total) by mouth every 8 (eight) hours as needed for mild pain   amLODIPine (NORVASC) 10 mg tablet  Self No No   Sig: Take 1 tablet (10 mg total) by mouth daily   Patient not taking: Reported on 10/2/2024   amLODIPine (NORVASC) 10 mg tablet   No No   Sig: Take 1 tablet (10 mg total) by mouth daily   Patient not taking: Reported on 10/2/2024   cyclobenzaprine (FLEXERIL) 10 mg tablet  Self No No   Sig: Take 1 tablet (10 mg total) by mouth 2 (two) times a day as needed for muscle spasms   Patient not taking: Reported on 10/2/2024   hydrOXYzine HCL (ATARAX) 25 mg tablet  Self No No   Sig: Take 1 tablet (25 mg total) by mouth every 6 (six) hours   lidocaine (Lidoderm) 5 %   No No   Sig: Apply 1 patch topically over 12 hours daily Remove & Discard patch within 12 hours or as directed by MD   Patient not taking: Reported on 10/2/2024   naproxen (NAPROSYN) 500 mg tablet   No No   Sig: Take 1 tablet (500 mg total) by mouth 2 (two) times a day with meals for 10 days   rosuvastatin (CRESTOR) 10 MG tablet  Self No No   Sig: Take 1 tablet (10 mg total) by mouth daily   Patient not taking: Reported on 10/2/2024      Facility-Administered Medications: None     Discharge Medication List as of 11/7/2024  3:35 PM        CONTINUE these medications which have NOT CHANGED    Details   acetaminophen (TYLENOL) 500 mg tablet Take 1 tablet (500 mg total) by mouth every 6 (six) hours as needed for moderate pain, Starting Wed 9/25/2024, Normal      acetaminophen (TYLENOL) 650 mg CR tablet Take 1 tablet (650 mg total) by mouth every 8 (eight) hours as needed for mild pain, Starting Tue 9/17/2024, Normal      !!  amLODIPine (NORVASC) 10 mg tablet Take 1 tablet (10 mg total) by mouth daily, Starting Mon 11/6/2023, Normal      !! amLODIPine (NORVASC) 10 mg tablet Take 1 tablet (10 mg total) by mouth daily, Starting Sun 8/18/2024, Normal      cyclobenzaprine (FLEXERIL) 10 mg tablet Take 1 tablet (10 mg total) by mouth 2 (two) times a day as needed for muscle spasms, Starting Wed 8/30/2023, Normal      hydrOXYzine HCL (ATARAX) 25 mg tablet Take 1 tablet (25 mg total) by mouth every 6 (six) hours, Starting Sun 7/23/2023, Normal      lidocaine (Lidoderm) 5 % Apply 1 patch topically over 12 hours daily Remove & Discard patch within 12 hours or as directed by MD, Starting Tue 9/17/2024, Normal      naproxen (NAPROSYN) 500 mg tablet Take 1 tablet (500 mg total) by mouth 2 (two) times a day with meals for 10 days, Starting Fri 9/27/2024, Until Mon 10/7/2024, Normal      rosuvastatin (CRESTOR) 10 MG tablet Take 1 tablet (10 mg total) by mouth daily, Starting Wed 11/8/2023, Normal       !! - Potential duplicate medications found. Please discuss with provider.          ED SEPSIS DOCUMENTATION   Time reflects when diagnosis was documented in both MDM as applicable and the Disposition within this note       Time User Action Codes Description Comment    11/7/2024  3:18 PM Daniele Adamson [R07.9] Chest pain, unspecified type                  Evangelina Holguin MD  11/08/24 3628

## 2024-11-08 ENCOUNTER — HOSPITAL ENCOUNTER (EMERGENCY)
Facility: HOSPITAL | Age: 64
Discharge: HOME | End: 2024-11-08
Attending: EMERGENCY MEDICINE
Payer: COMMERCIAL

## 2024-11-08 ENCOUNTER — APPOINTMENT (EMERGENCY)
Dept: CT IMAGING | Facility: HOSPITAL | Age: 64
End: 2024-11-08
Payer: COMMERCIAL

## 2024-11-08 VITALS
DIASTOLIC BLOOD PRESSURE: 123 MMHG | OXYGEN SATURATION: 100 % | HEART RATE: 50 BPM | TEMPERATURE: 98.5 F | RESPIRATION RATE: 16 BRPM | SYSTOLIC BLOOD PRESSURE: 181 MMHG

## 2024-11-08 DIAGNOSIS — R07.9 CHEST PAIN, UNSPECIFIED TYPE: Primary | ICD-10-CM

## 2024-11-08 LAB
ANION GAP SERPL CALCULATED.3IONS-SCNC: 9 MMOL/L (ref 4–13)
BASOPHILS # BLD AUTO: 0.03 THOUSANDS/ÂΜL (ref 0–0.1)
BASOPHILS NFR BLD AUTO: 1 % (ref 0–1)
BUN SERPL-MCNC: 11 MG/DL (ref 5–25)
CALCIUM SERPL-MCNC: 9.5 MG/DL (ref 8.4–10.2)
CARDIAC TROPONIN I PNL SERPL HS: <2 NG/L
CHLORIDE SERPL-SCNC: 105 MMOL/L (ref 96–108)
CO2 SERPL-SCNC: 26 MMOL/L (ref 21–32)
CREAT SERPL-MCNC: 1.2 MG/DL (ref 0.6–1.3)
EOSINOPHIL # BLD AUTO: 0.04 THOUSAND/ÂΜL (ref 0–0.61)
EOSINOPHIL NFR BLD AUTO: 1 % (ref 0–6)
ERYTHROCYTE [DISTWIDTH] IN BLOOD BY AUTOMATED COUNT: 15.1 % (ref 11.6–15.1)
GFR SERPL CREATININE-BSD FRML MDRD: 47 ML/MIN/1.73SQ M
GLUCOSE SERPL-MCNC: 81 MG/DL (ref 65–140)
HCT VFR BLD AUTO: 35.3 % (ref 34.8–46.1)
HGB BLD-MCNC: 11.9 G/DL (ref 11.5–15.4)
IMM GRANULOCYTES # BLD AUTO: 0.01 THOUSAND/UL (ref 0–0.2)
IMM GRANULOCYTES NFR BLD AUTO: 0 % (ref 0–2)
LYMPHOCYTES # BLD AUTO: 2.08 THOUSANDS/ÂΜL (ref 0.6–4.47)
LYMPHOCYTES NFR BLD AUTO: 36 % (ref 14–44)
MCH RBC QN AUTO: 30.3 PG (ref 26.8–34.3)
MCHC RBC AUTO-ENTMCNC: 33.7 G/DL (ref 31.4–37.4)
MCV RBC AUTO: 90 FL (ref 82–98)
MONOCYTES # BLD AUTO: 0.32 THOUSAND/ÂΜL (ref 0.17–1.22)
MONOCYTES NFR BLD AUTO: 6 % (ref 4–12)
NEUTROPHILS # BLD AUTO: 3.27 THOUSANDS/ÂΜL (ref 1.85–7.62)
NEUTS SEG NFR BLD AUTO: 56 % (ref 43–75)
NRBC BLD AUTO-RTO: 0 /100 WBCS
PLATELET # BLD AUTO: 317 THOUSANDS/UL (ref 149–390)
PMV BLD AUTO: 11 FL (ref 8.9–12.7)
POTASSIUM SERPL-SCNC: 3.8 MMOL/L (ref 3.5–5.3)
RBC # BLD AUTO: 3.93 MILLION/UL (ref 3.81–5.12)
SODIUM SERPL-SCNC: 140 MMOL/L (ref 135–147)
WBC # BLD AUTO: 5.75 THOUSAND/UL (ref 4.31–10.16)

## 2024-11-08 PROCEDURE — 80048 BASIC METABOLIC PNL TOTAL CA: CPT | Performed by: EMERGENCY MEDICINE

## 2024-11-08 PROCEDURE — 99285 EMERGENCY DEPT VISIT HI MDM: CPT | Performed by: EMERGENCY MEDICINE

## 2024-11-08 PROCEDURE — 96374 THER/PROPH/DIAG INJ IV PUSH: CPT

## 2024-11-08 PROCEDURE — 99285 EMERGENCY DEPT VISIT HI MDM: CPT

## 2024-11-08 PROCEDURE — 84484 ASSAY OF TROPONIN QUANT: CPT | Performed by: EMERGENCY MEDICINE

## 2024-11-08 PROCEDURE — 85025 COMPLETE CBC W/AUTO DIFF WBC: CPT | Performed by: EMERGENCY MEDICINE

## 2024-11-08 PROCEDURE — 36415 COLL VENOUS BLD VENIPUNCTURE: CPT | Performed by: EMERGENCY MEDICINE

## 2024-11-08 PROCEDURE — 93005 ELECTROCARDIOGRAM TRACING: CPT

## 2024-11-08 PROCEDURE — 70450 CT HEAD/BRAIN W/O DYE: CPT

## 2024-11-08 RX ORDER — MORPHINE SULFATE 4 MG/ML
4 INJECTION, SOLUTION INTRAMUSCULAR; INTRAVENOUS ONCE
Status: COMPLETED | OUTPATIENT
Start: 2024-11-08 | End: 2024-11-08

## 2024-11-08 RX ADMIN — MORPHINE SULFATE 4 MG: 4 INJECTION, SOLUTION INTRAMUSCULAR; INTRAVENOUS at 19:34

## 2024-11-09 LAB
ATRIAL RATE: 52 BPM
P AXIS: 40 DEGREES
PR INTERVAL: 164 MS
QRS AXIS: 9 DEGREES
QRSD INTERVAL: 60 MS
QT INTERVAL: 422 MS
QTC INTERVAL: 393 MS
T WAVE AXIS: 89 DEGREES
VENTRICULAR RATE: 52 BPM

## 2024-11-09 PROCEDURE — 93010 ELECTROCARDIOGRAM REPORT: CPT | Performed by: STUDENT IN AN ORGANIZED HEALTH CARE EDUCATION/TRAINING PROGRAM

## 2024-11-12 ENCOUNTER — OFFICE VISIT (OUTPATIENT)
Dept: FAMILY MEDICINE CLINIC | Facility: CLINIC | Age: 64
End: 2024-11-12

## 2024-11-12 VITALS
DIASTOLIC BLOOD PRESSURE: 80 MMHG | HEART RATE: 76 BPM | SYSTOLIC BLOOD PRESSURE: 132 MMHG | WEIGHT: 176 LBS | RESPIRATION RATE: 20 BRPM | OXYGEN SATURATION: 98 % | TEMPERATURE: 98 F | BODY MASS INDEX: 31.18 KG/M2 | HEIGHT: 63 IN

## 2024-11-12 DIAGNOSIS — R00.2 INTERMITTENT PALPITATIONS: Primary | ICD-10-CM

## 2024-11-12 DIAGNOSIS — F41.9 ANXIETY: ICD-10-CM

## 2024-11-12 DIAGNOSIS — E78.2 MIXED HYPERLIPIDEMIA: ICD-10-CM

## 2024-11-12 DIAGNOSIS — F17.200 TOBACCO DEPENDENCY: ICD-10-CM

## 2024-11-12 DIAGNOSIS — R73.03 PREDIABETES: ICD-10-CM

## 2024-11-12 DIAGNOSIS — G89.29 CHRONIC BACK PAIN: ICD-10-CM

## 2024-11-12 DIAGNOSIS — M54.9 CHRONIC BACK PAIN: ICD-10-CM

## 2024-11-12 PROCEDURE — 99214 OFFICE O/P EST MOD 30 MIN: CPT | Performed by: FAMILY MEDICINE

## 2024-11-12 RX ORDER — ROSUVASTATIN CALCIUM 10 MG/1
10 TABLET, COATED ORAL DAILY
Qty: 90 TABLET | Refills: 2 | Status: SHIPPED | OUTPATIENT
Start: 2024-11-12

## 2024-11-12 RX ORDER — IBUPROFEN 600 MG/1
600 TABLET, FILM COATED ORAL EVERY 6 HOURS PRN
Qty: 30 TABLET | Refills: 1 | Status: SHIPPED | OUTPATIENT
Start: 2024-11-12

## 2024-11-12 RX ORDER — HYDROXYZINE HYDROCHLORIDE 25 MG/1
25 TABLET, FILM COATED ORAL EVERY 6 HOURS
Qty: 120 TABLET | Refills: 0 | Status: SHIPPED | OUTPATIENT
Start: 2024-11-12

## 2024-11-12 NOTE — PROGRESS NOTES
Ambulatory Visit  Name: Latoya Lewis      : 1960      MRN: [id number]  Encounter Provider: Osmel Briscoe MD  Encounter Date: 2024   Encounter department: [facility]    Assessment & Plan  Mixed hyperlipidemia  Lab Results   Component Value Date    CHOLESTEROL 235 (H) 10/07/2024    CHOLESTEROL 237 (H) 2023    CHOLESTEROL 220 (H) 2022     Lab Results   Component Value Date    HDL 51 10/07/2024    HDL 52 2023    HDL 56 2022     Lab Results   Component Value Date    TRIG 114 10/07/2024    TRIG 90 2023    TRIG 119 2022     Lab Results   Component Value Date    NONHDLC 184 10/07/2024    NONHDLC 185 2023      Lab Results   Component Value Date    LDLCALC 161 (H) 10/07/2024   High uncontrolled cholesterol and LDL  Has not been on any statin treatment for over a year  Goal cholesterol <200 and LDL <70  Previous home med: rosuvastatin 10 mg QD  ASCVD 24.3%    Plan  Order rosuvastatin 10 mg  To repeat lipid panel test in 6 months  Orders:    rosuvastatin (CRESTOR) 10 MG tablet; Take 1 tablet (10 mg total) by mouth daily    Chronic back pain   >>ASSESSMENT AND PLAN FOR LUMBAR BACK PAIN WRITTEN ON 2024  7:58 PM BY OSMEL BRISCOE MD    History of fall , preceded first back pain symptom  Generalized paraspinal back pain  Pain worse on the upper right back  Not associated with red flag signs  Previously resolved with 600 mg ibuprofen, according to the       Plan  Ordered 600 mg ibuprofen Q6H PRN  Orders:    ibuprofen (MOTRIN) 600 mg tablet; Take 1 tablet (600 mg total) by mouth every 6 (six) hours as needed for mild pain    XR spine lumbar 2 or 3 views injury; Future    Anxiety  History of anxiety disorder diagnosed since 2018  Had not been on meds for over a year  She seemed worried and anxious about replies when asked about her symptoms and possible underlying medical conditions   states she complains of worsening of  palpitations, dave when having to meet people outside  He also states she is always anxious and worried on a daily basis  MIC today 12  Previous home med: atarax 25 mg    Plan  Restart home med  To follow up in 2 weeks for further evaluation  Orders:    hydrOXYzine HCL (ATARAX) 25 mg tablet; Take 1 tablet (25 mg total) by mouth every 6 (six) hours    Echo complete w/ contrast if indicated; Future    Prediabetes    Lab Results   Component Value Date    HGBA1C 5.7 (H) 10/07/2024   Pre Diabetic  HbA1C 5.7 (10/07/24)  Last glucose 81 (11/08/2024)  Last Cr 1.2 (11/08/2024)    Plan  To cont monitoring HbA1c and glucose levels every 6 months  Advise on healthy diet          Tobacco dependency  History of smoking one pack a day for 40 years  Quit about  2 years ago    Plan  Lung CT screening  Orders:    CT lung screening program; Future    Intermittent palpitations  Intermittent palpitations present for duration unable to quantify  Symptom not preceding any particular event  Associated with chest pain at times  Denies any SOB, sweating, nausea, vomitting, recent travel or sick contacts  Last TSH 2.570 normal  Lab Results   Component Value Date    [id number] 2.570 10/07/2024   Last went to the ED because of chest pain twice on 11/7/24 and 11/8/2024  Both EKGs at ED both showed sinus bradycardia with septal infarct    EKG(11/7/24) :Marked sinus bradycardia 44 bpm, Low voltage QRS, Septal infarct , age undetermined, T wave abnormality, consider lateral ischemia, Abnormal ECG    EKG(11/8/24): Sinus bradycardia 52 bpm, low voltage QRS, Septal infarct (cited on or before 07-Nov-2024) Abnormal ECG when compared with ECG of 07-Nov-2024 14:54, Serial changes of Septal infarct Present      Plan  Order echo  Orders:    Echo complete w/ contrast if indicated; Future       History of Present Illness     Patient is a 63 y/o female with PMH HLD, Pre-DM and anxiety presenting with feeling of racing heartbeats. She is unable to give  adequate or certain history, instead her  report that she has been complaining of it for the past couple months but he cannot determine exactly when it started. On their last office visit 2 months ago it was not present. He states she complains of it every now and then. At times it is associated with chest pain, however he denies that she has expressed difficulties breathing, numbness, sweating, sudden syncope or any recent falls. He also states that she seems to be particularly more anxious and complains of the feeling when they have to go over and meet others, even when having to visit his family.    She also complains of generalized back pain, especially on the right upper side. However she is uncertain where exactly the pain usually occurs when asked again. She states that it is constantly present. According to the  it started back in 2010 after she had a fall in the basement, whereby there was a deep hole on the basement floor to which she fell inside. Thereafter he states she started complaining of back pain that would usually be resolved after taking ibuprofen 600 mg. He denies she has complains controlling urination or having bowel movements.    She is also complaining of ringing in her left ear that she has started experiencing, although unable to state when it started. She states that it is associated with headache at times, however denies any changes in her hearing, dizziness, preceding fall, preceding infection, nausea, vomiting or lightheadedness. She reports actively hearing a ringing sound during this visit.    She reports that she has smoked for about 40 years, one pack a day and that she quit about  2 years ago            Review of Systems   Constitutional:  Negative for chills and fever.   HENT:  Positive for tinnitus (in left ear). Negative for ear discharge, ear pain, hearing loss, rhinorrhea and sore throat.    Eyes:  Negative for pain and visual disturbance.   Respiratory:   "Negative for cough and shortness of breath.    Cardiovascular:  Positive for chest pain and palpitations. Negative for leg swelling.   Gastrointestinal:  Negative for abdominal pain, blood in stool, constipation, diarrhea, nausea and vomiting.   Genitourinary:  Negative for difficulty urinating, dysuria, flank pain, frequency and hematuria.   Musculoskeletal:  Positive for back pain. Negative for arthralgias.   Skin:  Negative for color change and rash.        Very dry   Neurological:  Negative for syncope.   Psychiatric/Behavioral:          Memory deficit very evident   All other systems reviewed and are negative.          Objective     /80 (BP Location: Right arm, Patient Position: Sitting, Cuff Size: Standard)   Pulse 76   Temp 98 °F (36.7 °C) (Temporal)   Resp 20   Ht 5' 3\" (1.6 m)   Wt 79.8 kg (176 lb)   SpO2 98%   BMI 31.18 kg/m²     Physical Exam  Vitals and nursing note reviewed.   Constitutional:       General: She is not in acute distress.     Appearance: She is well-developed.   HENT:      Head: Normocephalic and atraumatic.      Mouth/Throat:      Mouth: Mucous membranes are moist.   Eyes:      Conjunctiva/sclera: Conjunctivae normal.   Cardiovascular:      Rate and Rhythm: Normal rate and regular rhythm.      Pulses: Normal pulses.      Heart sounds: Normal heart sounds. No murmur heard.     Comments: HR:62 bpm  Pulmonary:      Effort: Pulmonary effort is normal. No respiratory distress.      Breath sounds: Normal breath sounds.   Musculoskeletal:         General: Tenderness (Mild chest tenderness and generalized paraspinal back pain, dave righ upper back) present. No swelling, deformity or signs of injury.      Cervical back: Neck supple.   Skin:     General: Skin is warm and dry.      Capillary Refill: Capillary refill takes less than 2 seconds.      Findings: Lesion present. No acne, bruising, erythema, signs of injury, laceration, petechiae, rash or wound.             Comments: " Well-healed scar present on right upper back from lipoma removal in right flank since Jan 2024.   Neurological:      Mental Status: She is alert.   Psychiatric:         Mood and Affect: Mood normal.

## 2024-11-13 PROBLEM — G89.29 CHRONIC BACK PAIN: Status: ACTIVE | Noted: 2021-06-24

## 2024-11-13 PROBLEM — M54.9 CHRONIC BACK PAIN: Status: ACTIVE | Noted: 2024-11-13

## 2024-11-13 PROBLEM — G89.29 CHRONIC BACK PAIN: Status: ACTIVE | Noted: 2024-11-13

## 2024-11-13 PROBLEM — Z72.0 TOBACCO ABUSE: Status: ACTIVE | Noted: 2024-11-13

## 2024-11-13 PROBLEM — M54.9 CHRONIC BACK PAIN: Status: ACTIVE | Noted: 2021-06-24

## 2024-11-13 PROBLEM — R00.2 INTERMITTENT PALPITATIONS: Status: ACTIVE | Noted: 2024-11-13

## 2024-11-13 PROBLEM — R73.03 PREDIABETES: Status: ACTIVE | Noted: 2018-09-20

## 2024-11-14 NOTE — ASSESSMENT & PLAN NOTE
Intermittent palpitations present for duration unable to quantify  Symptom not preceding any particular event  Associated with chest pain at times  Denies any SOB, sweating, nausea, vomitting, recent travel or sick contacts  Last TSH 2.570 normal  Lab Results   Component Value Date    [id number] 2.570 10/07/2024   Last went to the ED because of chest pain twice on 11/7/24 and 11/8/2024  Both EKGs at ED both showed sinus bradycardia with septal infarct    EKG(11/7/24) :Marked sinus bradycardia 44 bpm, Low voltage QRS, Septal infarct , age undetermined, T wave abnormality, consider lateral ischemia, Abnormal ECG    EKG(11/8/24): Sinus bradycardia 52 bpm, low voltage QRS, Septal infarct (cited on or before 07-Nov-2024) Abnormal ECG when compared with ECG of 07-Nov-2024 14:54, Serial changes of Septal infarct Present      Plan  Order echo  Orders:    Echo complete w/ contrast if indicated; Future

## 2024-11-14 NOTE — ASSESSMENT & PLAN NOTE
History of smoking one pack a day for 40 years  Quit about  2 years ago    Plan  Lung CT screening  Orders:    CT lung screening program; Future

## 2024-11-14 NOTE — ASSESSMENT & PLAN NOTE
History of fall 2010, preceded first back pain symptom  Generalized paraspinal back pain  Pain worse on the upper right back  Not associated with red flag signs  Previously resolved with 600 mg ibuprofen, according to the       Plan  Ordered 600 mg ibuprofen Q6H PRN

## 2024-11-14 NOTE — ASSESSMENT & PLAN NOTE
>>ASSESSMENT AND PLAN FOR LUMBAR BACK PAIN WRITTEN ON 11/13/2024  7:58 PM BY OSMEL MUSTAFA MD    History of fall 2010, preceded first back pain symptom  Generalized paraspinal back pain  Pain worse on the upper right back  Not associated with red flag signs  Previously resolved with 600 mg ibuprofen, according to the       Plan  Ordered 600 mg ibuprofen Q6H PRN  Orders:    ibuprofen (MOTRIN) 600 mg tablet; Take 1 tablet (600 mg total) by mouth every 6 (six) hours as needed for mild pain    XR spine lumbar 2 or 3 views injury; Future

## 2024-11-14 NOTE — ASSESSMENT & PLAN NOTE
History of anxiety disorder diagnosed since 2018  Had not been on meds for over a year  She seemed worried and anxious about replies when asked about her symptoms and possible underlying medical conditions   states she complains of worsening of palpitations, dave when having to meet people outside  He also states she is always anxious and worried on a daily basis  MIC today 12  Previous home med: atarax 25 mg    Plan  Restart home med  To follow up in 2 weeks for further evaluation  Orders:    hydrOXYzine HCL (ATARAX) 25 mg tablet; Take 1 tablet (25 mg total) by mouth every 6 (six) hours    Echo complete w/ contrast if indicated; Future

## 2024-11-14 NOTE — ASSESSMENT & PLAN NOTE
Lab Results   Component Value Date    HGBA1C 5.7 (H) 10/07/2024   Pre Diabetic  HbA1C 5.7 (10/07/24)  Last glucose 81 (11/08/2024)  Last Cr 1.2 (11/08/2024)    Plan  To cont monitoring HbA1c and glucose levels every 6 months  Advise on healthy diet

## 2024-11-14 NOTE — ASSESSMENT & PLAN NOTE
Lab Results   Component Value Date    CHOLESTEROL 235 (H) 10/07/2024    CHOLESTEROL 237 (H) 11/06/2023    CHOLESTEROL 220 (H) 11/09/2022     Lab Results   Component Value Date    HDL 51 10/07/2024    HDL 52 11/06/2023    HDL 56 11/09/2022     Lab Results   Component Value Date    TRIG 114 10/07/2024    TRIG 90 11/06/2023    TRIG 119 11/09/2022     Lab Results   Component Value Date    NONHDLC 184 10/07/2024    NONHDLC 185 11/06/2023      Lab Results   Component Value Date    LDLCALC 161 (H) 10/07/2024   High uncontrolled cholesterol and LDL  Has not been on any statin treatment for over a year  Goal cholesterol <200 and LDL <70  Previous home med: rosuvastatin 10 mg QD  ASCVD 24.3%    Plan  Order rosuvastatin 10 mg  To repeat lipid panel test in 6 months  Orders:    rosuvastatin (CRESTOR) 10 MG tablet; Take 1 tablet (10 mg total) by mouth daily

## 2024-11-22 ENCOUNTER — HOSPITAL ENCOUNTER (EMERGENCY)
Facility: HOSPITAL | Age: 64
Discharge: HOME | End: 2024-11-22
Attending: EMERGENCY MEDICINE
Payer: COMMERCIAL

## 2024-11-22 VITALS
WEIGHT: 178.57 LBS | DIASTOLIC BLOOD PRESSURE: 115 MMHG | OXYGEN SATURATION: 96 % | SYSTOLIC BLOOD PRESSURE: 190 MMHG | RESPIRATION RATE: 18 BRPM | BODY MASS INDEX: 31.63 KG/M2 | HEART RATE: 53 BPM | TEMPERATURE: 98 F

## 2024-11-22 DIAGNOSIS — H10.9 CONJUNCTIVITIS: Primary | ICD-10-CM

## 2024-11-22 DIAGNOSIS — I10 HYPERTENSION: ICD-10-CM

## 2024-11-22 PROCEDURE — 99284 EMERGENCY DEPT VISIT MOD MDM: CPT | Performed by: EMERGENCY MEDICINE

## 2024-11-22 PROCEDURE — 99284 EMERGENCY DEPT VISIT MOD MDM: CPT

## 2024-11-22 RX ORDER — ERYTHROMYCIN 5 MG/G
OINTMENT OPHTHALMIC
Qty: 3.5 G | Refills: 0 | Status: SHIPPED | OUTPATIENT
Start: 2024-11-22

## 2024-11-22 RX ORDER — TETRACAINE HYDROCHLORIDE 5 MG/ML
1 SOLUTION OPHTHALMIC ONCE
Status: COMPLETED | OUTPATIENT
Start: 2024-11-22 | End: 2024-11-22

## 2024-11-22 RX ORDER — AMLODIPINE BESYLATE 5 MG/1
5 TABLET ORAL DAILY
Qty: 30 TABLET | Refills: 0 | Status: SHIPPED | OUTPATIENT
Start: 2024-11-22

## 2024-11-22 RX ADMIN — TETRACAINE HYDROCHLORIDE 1 DROP: 5 SOLUTION OPHTHALMIC at 14:18

## 2024-11-22 NOTE — ED PROVIDER NOTES
Time reflects when diagnosis was documented in both MDM as applicable and the Disposition within this note       Time User Action Codes Description Comment    11/22/2024  2:31 PM Theresa Cole Add [H10.9] Conjunctivitis     11/22/2024  2:31 PM Theresa Cole Add [I10] Hypertension           ED Disposition       ED Disposition   Discharge    Condition   Stable    Date/Time   Fri Nov 22, 2024  2:31 PM    Comment   Latoya Lewis discharge to home/self care.                   Assessment & Plan       Medical Decision Making  64-year-old female with bilateral eye pain.  Differential diagnose includes acute angle-closure glaucoma, conjunctivitis.  IOP of 21 in each side, do not think acute angle-closure glaucoma.  Likely conjunctivitis, will treat with erythromycin ointment.    Risk  Prescription drug management.        ED Course as of 11/22/24 1846 Fri Nov 22, 2024   1426 IOP 21 in both eyes       Medications   tetracaine 0.5 % ophthalmic solution 1 drop (1 drop Both Eyes Given by Other 11/22/24 1418)       ED Risk Strat Scores                           SBIRT 20yo+      Flowsheet Row Most Recent Value   Initial Alcohol Screen: US AUDIT-C     1. How often do you have a drink containing alcohol? 0 Filed at: 11/22/2024 1412   2. How many drinks containing alcohol do you have on a typical day you are drinking?  0 Filed at: 11/22/2024 1412   3a. Male UNDER 65: How often do you have five or more drinks on one occasion? 0 Filed at: 11/22/2024 1412   3b. FEMALE Any Age, or MALE 65+: How often do you have 4 or more drinks on one occassion? 0 Filed at: 11/22/2024 1412   Audit-C Score 0 Filed at: 11/22/2024 1412   VANIA: How many times in the past year have you...    Used an illegal drug or used a prescription medication for non-medical reasons? Never Filed at: 11/22/2024 1412                            History of Present Illness       Chief Complaint   Patient presents with    Eye Redness     Pt had right eye redness for a few days  and now it is left eye redness.     Blurred Vision     Pt reports she is experiencing blurry vision for 2 weeks.        Past Medical History:   Diagnosis Date    Anxiety     Chronic pain     back pain     Depression 2024    Diabetes mellitus (HCC)     Disease of thyroid gland 2024    hypothyroid     Hyperlipidemia     Hypertension     Psychiatric disorder 2024      Past Surgical History:   Procedure Laterality Date    DENTAL SURGERY      HYSTERECTOMY      over 20 years ago    OK EXCISION TUMOR SOFT TIS BACK/FLANK SUBQ 3 CM/> Right 2024    Procedure: UPPER BACK EXCISION  BX LESION/MASS;  Surgeon: Jade Farah MD;  Location: [facility];  Service: General      Family History   Problem Relation Age of Onset    Dementia Mother     Cancer Maternal Aunt     Cancer Maternal Uncle       Social History     Tobacco Use    Smoking status: Former     Current packs/day: 0.00     Average packs/day: 0.5 packs/day for 40.0 years (20.0 ttl pk-yrs)     Types: Cigarettes     Start date: 1983     Quit date: 2023     Years since quittin.5     Passive exposure: Past    Smokeless tobacco: Never   Vaping Use    Vaping status: Never Used   Substance Use Topics    Alcohol use: Yes     Comment: socially     Drug use: Never      E-Cigarette/Vaping    E-Cigarette Use Never User       E-Cigarette/Vaping Substances    Nicotine No     THC No     CBD No     Flavoring No     Other No     Unknown No       I have reviewed and agree with the history as documented.     64-year-old female presenting emerged department with bilateral eye pain.  Also notes redness in the left eye now but initially started in the right eye and then spread to the left eye.  No discharge from the eyes.  No foreign body sensation.        Review of Systems   Constitutional:  Negative for chills and fever.   HENT:  Negative for ear pain and sore throat.    Eyes:  Positive for pain, redness and visual disturbance.   Respiratory:  Negative for  cough and shortness of breath.    Cardiovascular:  Negative for chest pain and palpitations.   Gastrointestinal:  Negative for abdominal pain and vomiting.   Genitourinary:  Negative for dysuria and hematuria.   Musculoskeletal:  Negative for arthralgias and back pain.   Skin:  Negative for color change and rash.   Neurological:  Negative for seizures and syncope.   All other systems reviewed and are negative.          Objective       ED Triage Vitals [11/22/24 1359]   Temperature Pulse Blood Pressure Respirations SpO2 Patient Position - Orthostatic VS   98 °F (36.7 °C) (!) 53 (!) 208/115 18 96 % Lying      Temp Source Heart Rate Source BP Location FiO2 (%) Pain Score    Oral Monitor Right arm -- --      Vitals      Date and Time Temp Pulse SpO2 Resp BP Pain Score FACES Pain Rating User   11/22/24 1430 -- -- -- -- 190/115 -- -- SA   11/22/24 1359 98 °F (36.7 °C) 53 96 % 18 208/115 -- -- KR            Physical Exam  Vitals and nursing note reviewed.   Constitutional:       General: She is not in acute distress.     Appearance: She is well-developed.   HENT:      Head: Normocephalic and atraumatic.   Eyes:      General:         Right eye: No discharge.         Left eye: No discharge.      Extraocular Movements: Extraocular movements intact.      Pupils: Pupils are equal, round, and reactive to light.      Comments: Left conjunctival erythema, right normal conjunctiva.   Pulmonary:      Effort: Pulmonary effort is normal. No respiratory distress.   Musculoskeletal:         General: No swelling.      Cervical back: Neck supple.   Skin:     General: Skin is warm and dry.   Neurological:      Mental Status: She is alert.   Psychiatric:         Mood and Affect: Mood normal.         Results Reviewed       None            No orders to display       Procedures    ED Medication and Procedure Management   Prior to Admission Medications   Prescriptions Last Dose Informant Patient Reported? Taking?   acetaminophen (TYLENOL) 500  mg tablet   No No   Sig: Take 1 tablet (500 mg total) by mouth every 6 (six) hours as needed for moderate pain   Patient not taking: Reported on 10/2/2024   acetaminophen (TYLENOL) 650 mg CR tablet   No No   Sig: Take 1 tablet (650 mg total) by mouth every 8 (eight) hours as needed for mild pain   amLODIPine (NORVASC) 10 mg tablet  Self No No   Sig: Take 1 tablet (10 mg total) by mouth daily   Patient not taking: Reported on 10/2/2024   amLODIPine (NORVASC) 10 mg tablet   No No   Sig: Take 1 tablet (10 mg total) by mouth daily   Patient not taking: Reported on 10/2/2024   cyclobenzaprine (FLEXERIL) 10 mg tablet  Self No No   Sig: Take 1 tablet (10 mg total) by mouth 2 (two) times a day as needed for muscle spasms   Patient not taking: Reported on 10/2/2024   hydrOXYzine HCL (ATARAX) 25 mg tablet   No No   Sig: Take 1 tablet (25 mg total) by mouth every 6 (six) hours   ibuprofen (MOTRIN) 600 mg tablet   No No   Sig: Take 1 tablet (600 mg total) by mouth every 6 (six) hours as needed for mild pain   lidocaine (Lidoderm) 5 %   No No   Sig: Apply 1 patch topically over 12 hours daily Remove & Discard patch within 12 hours or as directed by MD   Patient not taking: Reported on 10/2/2024   naproxen (NAPROSYN) 500 mg tablet   No No   Sig: Take 1 tablet (500 mg total) by mouth 2 (two) times a day with meals for 10 days   rosuvastatin (CRESTOR) 10 MG tablet   No No   Sig: Take 1 tablet (10 mg total) by mouth daily      Facility-Administered Medications: None     Discharge Medication List as of 11/22/2024  2:36 PM        START taking these medications    Details   !! amLODIPine (NORVASC) 5 mg tablet Take 1 tablet (5 mg total) by mouth daily, Starting Fri 11/22/2024, Normal      erythromycin (ILOTYCIN) ophthalmic ointment Place a 1/2 inch ribbon of ointment into the right lower eyelid.  Apply twice a day for 5 days, Normal       !! - Potential duplicate medications found. Please discuss with provider.        CONTINUE these  medications which have NOT CHANGED    Details   acetaminophen (TYLENOL) 500 mg tablet Take 1 tablet (500 mg total) by mouth every 6 (six) hours as needed for moderate pain, Starting Wed 9/25/2024, Normal      acetaminophen (TYLENOL) 650 mg CR tablet Take 1 tablet (650 mg total) by mouth every 8 (eight) hours as needed for mild pain, Starting Tue 9/17/2024, Normal      !! amLODIPine (NORVASC) 10 mg tablet Take 1 tablet (10 mg total) by mouth daily, Starting Mon 11/6/2023, Normal      !! amLODIPine (NORVASC) 10 mg tablet Take 1 tablet (10 mg total) by mouth daily, Starting Sun 8/18/2024, Normal      cyclobenzaprine (FLEXERIL) 10 mg tablet Take 1 tablet (10 mg total) by mouth 2 (two) times a day as needed for muscle spasms, Starting Wed 8/30/2023, Normal      hydrOXYzine HCL (ATARAX) 25 mg tablet Take 1 tablet (25 mg total) by mouth every 6 (six) hours, Starting Tue 11/12/2024, Normal      ibuprofen (MOTRIN) 600 mg tablet Take 1 tablet (600 mg total) by mouth every 6 (six) hours as needed for mild pain, Starting Tue 11/12/2024, Normal      lidocaine (Lidoderm) 5 % Apply 1 patch topically over 12 hours daily Remove & Discard patch within 12 hours or as directed by MD, Starting Tue 9/17/2024, Normal      naproxen (NAPROSYN) 500 mg tablet Take 1 tablet (500 mg total) by mouth 2 (two) times a day with meals for 10 days, Starting Fri 9/27/2024, Until Mon 10/7/2024, Normal      rosuvastatin (CRESTOR) 10 MG tablet Take 1 tablet (10 mg total) by mouth daily, Starting Tue 11/12/2024, Normal       !! - Potential duplicate medications found. Please discuss with provider.        No discharge procedures on file.  ED SEPSIS DOCUMENTATION   Time reflects when diagnosis was documented in both MDM as applicable and the Disposition within this note       Time User Action Codes Description Comment    11/22/2024  2:31 PM Theresa Cole [H10.9] Conjunctivitis     11/22/2024  2:31 PM Theresa Cole [I10] Hypertension                   Theresa Cole MD  11/22/24 8692

## 2024-12-05 ENCOUNTER — VBI (OUTPATIENT)
Dept: ADMINISTRATIVE | Facility: OTHER | Age: 64
End: 2024-12-05

## 2024-12-05 NOTE — TELEPHONE ENCOUNTER
12/05/24 12:53 PM     Chart reviewed for Hemoglobin A1c was/were submitted to the patient's insurance.     Janki Bernstein MA   [facility]

## 2024-12-07 DIAGNOSIS — F41.9 ANXIETY: ICD-10-CM

## 2024-12-09 RX ORDER — HYDROXYZINE HYDROCHLORIDE 25 MG/1
25 TABLET, FILM COATED ORAL EVERY 6 HOURS
Qty: 120 TABLET | Refills: 0 | Status: SHIPPED | OUTPATIENT
Start: 2024-12-09 | End: 2024-12-13 | Stop reason: SDUPTHER

## 2024-12-13 ENCOUNTER — OFFICE VISIT (OUTPATIENT)
Dept: FAMILY MEDICINE CLINIC | Facility: CLINIC | Age: 64
End: 2024-12-13

## 2024-12-13 VITALS
HEIGHT: 63 IN | TEMPERATURE: 98.7 F | OXYGEN SATURATION: 99 % | SYSTOLIC BLOOD PRESSURE: 122 MMHG | RESPIRATION RATE: 18 BRPM | HEART RATE: 68 BPM | DIASTOLIC BLOOD PRESSURE: 80 MMHG | WEIGHT: 178 LBS | BODY MASS INDEX: 31.54 KG/M2

## 2024-12-13 DIAGNOSIS — E78.2 MIXED HYPERLIPIDEMIA: ICD-10-CM

## 2024-12-13 DIAGNOSIS — B30.9 ACUTE VIRAL CONJUNCTIVITIS OF BOTH EYES: ICD-10-CM

## 2024-12-13 DIAGNOSIS — I10 ESSENTIAL HYPERTENSION: ICD-10-CM

## 2024-12-13 DIAGNOSIS — I10 HYPERTENSION: ICD-10-CM

## 2024-12-13 DIAGNOSIS — F41.9 ANXIETY: Primary | ICD-10-CM

## 2024-12-13 DIAGNOSIS — R00.2 INTERMITTENT PALPITATIONS: ICD-10-CM

## 2024-12-13 DIAGNOSIS — F32.89 OTHER DEPRESSION: ICD-10-CM

## 2024-12-13 PROBLEM — I95.9 HYPOTENSION: Status: RESOLVED | Noted: 2018-09-20 | Resolved: 2024-12-13

## 2024-12-13 RX ORDER — AMLODIPINE BESYLATE 5 MG/1
5 TABLET ORAL DAILY
Qty: 30 TABLET | Refills: 0 | Status: SHIPPED | OUTPATIENT
Start: 2024-12-13

## 2024-12-13 RX ORDER — HYDROXYZINE HYDROCHLORIDE 25 MG/1
25 TABLET, FILM COATED ORAL EVERY 6 HOURS
Qty: 120 TABLET | Refills: 0 | Status: SHIPPED | OUTPATIENT
Start: 2024-12-13

## 2024-12-13 RX ORDER — SERTRALINE HYDROCHLORIDE 25 MG/1
50 TABLET, FILM COATED ORAL DAILY
Qty: 60 TABLET | Refills: 5 | Status: SHIPPED | OUTPATIENT
Start: 2024-12-13 | End: 2025-06-11

## 2024-12-13 RX ORDER — ROSUVASTATIN CALCIUM 20 MG/1
20 TABLET, COATED ORAL DAILY
Qty: 90 TABLET | Refills: 1 | Status: SHIPPED | OUTPATIENT
Start: 2024-12-13

## 2024-12-13 RX ORDER — ROSUVASTATIN CALCIUM 10 MG/1
20 TABLET, COATED ORAL DAILY
Qty: 90 TABLET | Refills: 2 | Status: SHIPPED | OUTPATIENT
Start: 2024-12-13 | End: 2024-12-13

## 2024-12-13 NOTE — ASSESSMENT & PLAN NOTE
EKG(11/7/24):Marked sinus bradycardia 44 bpm, Low voltage QRS, Septal infarct , age undetermined, T wave abnormality, consider lateral ischemia, Abnormal ECG     EKG(11/8/24): Sinus bradycardia 52 bpm, low voltage QRS, Septal infarct (cited on or before 07-Nov-2024) Abnormal ECG when compared with ECG of 07-Nov-2024 14:54, Serial changes of Septal infarct Present      Improvement since last visit   Echocardiography scheduled - 12/31/24

## 2024-12-13 NOTE — ASSESSMENT & PLAN NOTE
Uncontrolled.   Does not follow with a MH specialist.     Will start patient on Zoloft - 25 mg daily with increasing in 25 mg increments every week, up to 75 mg daily until the next visit.   We discussed SE, risks and benefits of this medication.   Continue Ataraxx 25 mg daily as needed. Recommended she may take the Zoloft in the morning and Atarax in the evening.   MH resource list provided in the AVS.     Orders:    hydrOXYzine HCL (ATARAX) 25 mg tablet; Take 1 tablet (25 mg total) by mouth every 6 (six) hours    sertraline (ZOLOFT) 25 mg tablet; Take 2 tablets (50 mg total) by mouth daily

## 2024-12-13 NOTE — ASSESSMENT & PLAN NOTE
See assessment an plan per anxiety.     Orders:    sertraline (ZOLOFT) 25 mg tablet; Take 2 tablets (50 mg total) by mouth daily

## 2024-12-13 NOTE — PROGRESS NOTES
Name: Latoya Lewis      : 1960      MRN: [id number]  Encounter Provider: Juan Diego Wang MD  Encounter Date: 2024   [facility]  :  Assessment & Plan  Anxiety  Uncontrolled.   Does not follow with a MH specialist.     Will start patient on Zoloft - 25 mg daily with increasing in 25 mg increments every week, up to 75 mg daily until the next visit.   We discussed SE, risks and benefits of this medication.   Continue Ataraxx 25 mg daily as needed. Recommended she may take the Zoloft in the morning and Atarax in the evening.   MH resource list provided in the AVS.     Orders:    hydrOXYzine HCL (ATARAX) 25 mg tablet; Take 1 tablet (25 mg total) by mouth every 6 (six) hours    sertraline (ZOLOFT) 25 mg tablet; Take 2 tablets (50 mg total) by mouth daily    Essential hypertension  Significantly elevated in the ED recently, however at goal today - 122/80 mmHg   Continue Amlodipine 5 mg daily. In case the BP rises, consider increasing the Amlodipine to 10 mg daily.        Mixed hyperlipidemia  Not at goal, significantly elevated ASCVD risk - 50.5%   Home medication Crestor 10 mg daily     Increase Crestor from 10 to 20 mg daily   Repeat Lipid panel at next visit     Orders:    rosuvastatin (CRESTOR) 20 MG tablet; Take 1 tablet (20 mg total) by mouth daily    Other depression  See assessment an plan per anxiety.     Orders:    sertraline (ZOLOFT) 25 mg tablet; Take 2 tablets (50 mg total) by mouth daily    Acute viral conjunctivitis of both eyes  Recent ED visit for same.   Some improvement with erythromycin ointment, however since it moved from the right to left eye, will refill erythromycin ophthalmic ointment.        Intermittent palpitations  EKG(24):Marked sinus bradycardia 44 bpm, Low voltage QRS, Septal infarct , age undetermined, T wave abnormality, consider lateral ischemia, Abnormal ECG     EKG(24): Sinus bradycardia 52 bpm,  "low voltage QRS, Septal infarct (cited on or before 07-Nov-2024) Abnormal ECG when compared with ECG of 07-Nov-2024 14:54, Serial changes of Septal infarct Present      Improvement since last visit   Echocardiography scheduled - 12/31/24              History of Present Illness     64-year-old female patient with a past medical history of hypertension, COPD, anxiety, depression, tobacco dependency, hyperlipidemia, prediabetes and intermittent palpitations comes to the office for follow-up of her palpitations.  Patient was recently seen in the office for intermittent palpitations approximately a month ago.  This was following an ED visit where it was shown to have new EKG changes.  Since then, the palpitations have been improved, as her and her  think that the anxiety might be contributing to it.  However, she has an echocardiography scheduled.  She has been taking Atarax daily as needed for anxiety.  He finds her anxiety very bothersome on a daily basis.      Review of Systems   Constitutional:  Negative for chills and fever.   HENT:  Negative for ear pain and sore throat.    Eyes:  Negative for pain and visual disturbance.   Respiratory:  Negative for cough and shortness of breath.    Cardiovascular:  Positive for palpitations. Negative for chest pain and leg swelling.   Gastrointestinal:  Negative for abdominal pain and vomiting.   Genitourinary:  Negative for dysuria and hematuria.   Musculoskeletal:  Negative for arthralgias and back pain.   Skin:  Negative for color change and rash.   Neurological:  Negative for seizures and syncope.   Psychiatric/Behavioral:  Negative for self-injury and suicidal ideas. The patient is nervous/anxious.    All other systems reviewed and are negative.      Objective   /80 (BP Location: Right arm, Patient Position: Sitting, Cuff Size: Standard)   Pulse 68   Temp 98.7 °F (37.1 °C) (Temporal)   Resp 18   Ht 5' 3\" (1.6 m)   Wt 80.7 kg (178 lb)   SpO2 99%   " Breastfeeding No   BMI 31.53 kg/m²      Physical Exam  Vitals and nursing note reviewed.   Constitutional:       General: She is not in acute distress.     Appearance: She is well-developed.   HENT:      Head: Normocephalic and atraumatic.      Right Ear: Tympanic membrane, ear canal and external ear normal. There is no impacted cerumen.      Left Ear: Tympanic membrane, ear canal and external ear normal. There is no impacted cerumen.      Nose: Nose normal. No congestion or rhinorrhea.      Mouth/Throat:      Mouth: Mucous membranes are moist.      Pharynx: Oropharynx is clear.   Eyes:      Conjunctiva/sclera: Conjunctivae normal.   Cardiovascular:      Rate and Rhythm: Normal rate and regular rhythm.      Pulses: Normal pulses.      Heart sounds: Normal heart sounds. No murmur heard.     No friction rub. No gallop.   Pulmonary:      Effort: Pulmonary effort is normal. No respiratory distress.      Breath sounds: Normal breath sounds.   Abdominal:      General: Abdomen is flat. Bowel sounds are normal.      Palpations: Abdomen is soft.      Tenderness: There is no abdominal tenderness.   Musculoskeletal:         General: No swelling.      Cervical back: Neck supple.   Skin:     General: Skin is warm and dry.      Capillary Refill: Capillary refill takes less than 2 seconds.   Neurological:      Mental Status: She is alert.   Psychiatric:         Mood and Affect: Mood normal.

## 2024-12-13 NOTE — PATIENT INSTRUCTIONS
Start Zoloft 25 mg daily   Increase Zoloft to 50 mg daily next week   Increase Zoloft to 75 mg daily on week 3     If you see a difference and feel better even at 25 or 50 mg daily, you may keep it at this dose       Outpatient Mental Health Resources    If you would like to be placed on the wait list for services with [facility] you MUST contact intake at [facility] - [phone]    Emergency & Crisis Support    Suicide and Crisis Lifeline: Call or text 988 (Available 24 hours)  Crisis Text Line: Text HOME to 397428 (Available 24/7)  Warm Line: Call [phone] (Confidential mental health support, available Monday-Sunday: 6 AM-10 AM & 4 PM-12 AM)  [facility] Crisis: Call [phone] (For mental health emergencies, or visit your local Emergency Department)  [facility] [place]: Call [phone] (24/7 [facility] Hotline, counseling, court & hospital accompaniment, free services)      [facility]    [facility]  Call [phone]  Website: [email or web address]  Support for mental health conditions. Free services for all    [facility]  [address] [place] [address]  [phone]  Services for all ages; Bilingual (English/Sammarinese); Accepts Medical Assistance, Medicare and commercial insurance    [facility]   [address], [place] [address]  [phone]  Services for all ages; Bilingual (English/Sammarinese); Accepts [employer] Blue Cross Blue Shield, [employer], Aetna, Optum and Cigna    [facility]  [address] [place] [address] [phone]  Services for ages 4+. English only; Does NOT accept Medical Assistance (only Commercial Insurance)    [facility]   [address], [place], [place]   [phone]  Services for all ages; English only; Accepts [employer] Blue [employer] Blue Shield, High[employer] Blue Cross Blue Sheild and Medicare    [facility]  [address]  [place] [address]  [phone]  Services for ages 6+. Bilingual (English/Northern Irish); Accepts Medical Assistance only    [facility]  [address] [place] [address]  [phone]  Services for ages 5+. Bilingual (English/Northern Irish); Accepts Medical Assistance    [facility]  [address] [place] [address]  [phone]  Services for ages 14+. Bilingual (English/Northern Irish); Accepts Medical Assistance, Medicare, and Commercial Insurance    [facility]  [address] [place] [address]  [phone]  Services for ages 5+. Bilingual (English/Northern Irish); Accepts Medical Assistance    [facility]  [address] [place] [address]  [phone]  Services for ages 3+. Bilingual (English/Northern Irish); Accepts Medical Assistance and Some Commercial Insurance    [facility]  [address], [place] [address]  [phone]  Services for ages 5+. Bilingual (English/Northern Irish); Accepts Medical Assistance    [facility]  [address], [address], [place] [address]  [phone]  Services for ages 6+. Bilingual (English/Northern Irish); Accepts Medical Assistance and Commercial Insurance    [facility]   [address] [place] [address]  [phone]  Services for ages 5+. Bilingual (English/Northern Irish); Accepts Medical Assistance, Medicare and Commercial Insurance     [facility]  [address], [place] [address]  [phone]  Services for all ages (Therapy); 18+ (Psychiatry); English only; Accepts [employer] Blue Cross, Aetna, Highmark, [employer], [facility] (CHIP & commercial insurance)      www.psychologytoday.com is a resource to find psychotherapy providers, patients can filter therapist search list based on several criteria including language, specialty, gender, insurance, etc. Individuals seeking will need to reach out to perspective providers through information in the directory. You are encouraged to contact multiple  providers to given that many providers have a significant wait list for services as well as to find a provider is a good fit for you!     Please contact your insurance provider for additional information.

## 2024-12-13 NOTE — ASSESSMENT & PLAN NOTE
Not at goal, significantly elevated ASCVD risk - 50.5%   Home medication Crestor 10 mg daily     Increase Crestor from 10 to 20 mg daily   Repeat Lipid panel at next visit     Orders:    rosuvastatin (CRESTOR) 20 MG tablet; Take 1 tablet (20 mg total) by mouth daily

## 2024-12-24 DIAGNOSIS — F41.9 ANXIETY: ICD-10-CM

## 2024-12-24 RX ORDER — HYDROXYZINE HYDROCHLORIDE 25 MG/1
25 TABLET, FILM COATED ORAL EVERY 6 HOURS
Qty: 120 TABLET | Refills: 0 | OUTPATIENT
Start: 2024-12-24

## 2024-12-31 ENCOUNTER — HOSPITAL ENCOUNTER (OUTPATIENT)
Dept: CT IMAGING | Facility: HOSPITAL | Age: 64
Discharge: HOME | End: 2024-12-31
Payer: COMMERCIAL

## 2024-12-31 ENCOUNTER — HOSPITAL ENCOUNTER (OUTPATIENT)
Dept: RADIOLOGY | Facility: HOSPITAL | Age: 64
Discharge: HOME | End: 2024-12-31
Payer: COMMERCIAL

## 2024-12-31 ENCOUNTER — HOSPITAL ENCOUNTER (OUTPATIENT)
Dept: NON INVASIVE DIAGNOSTICS | Facility: HOSPITAL | Age: 64
Discharge: HOME | End: 2024-12-31
Payer: COMMERCIAL

## 2024-12-31 VITALS
SYSTOLIC BLOOD PRESSURE: 133 MMHG | WEIGHT: 178 LBS | HEART RATE: 56 BPM | HEIGHT: 63 IN | BODY MASS INDEX: 31.54 KG/M2 | DIASTOLIC BLOOD PRESSURE: 74 MMHG

## 2024-12-31 DIAGNOSIS — M54.9 CHRONIC BACK PAIN: ICD-10-CM

## 2024-12-31 DIAGNOSIS — F41.9 ANXIETY: ICD-10-CM

## 2024-12-31 DIAGNOSIS — F17.200 TOBACCO DEPENDENCY: ICD-10-CM

## 2024-12-31 DIAGNOSIS — R00.2 INTERMITTENT PALPITATIONS: ICD-10-CM

## 2024-12-31 DIAGNOSIS — G89.29 CHRONIC BACK PAIN: ICD-10-CM

## 2024-12-31 LAB
AORTIC ROOT: 3.1 CM
AORTIC VALVE MEAN VELOCITY: 8.7 M/S
APICAL FOUR CHAMBER EJECTION FRACTION: 67 %
AV AREA BY CONTINUOUS VTI: 2.3 CM2
AV AREA PEAK VELOCITY: 2.1 CM2
AV LVOT MEAN GRADIENT: 2 MMHG
AV LVOT PEAK GRADIENT: 4 MMHG
AV MEAN GRADIENT: 4 MMHG
AV PEAK GRADIENT: 8 MMHG
AV VALVE AREA: 2.32 CM2
AV VELOCITY RATIO: 0.75
BSA FOR ECHO PROCEDURE: 1.84 M2
DOP CALC AO PEAK VEL: 1.39 M/S
DOP CALC AO VTI: 27.99 CM
DOP CALC LVOT AREA: 2.83 CM2
DOP CALC LVOT CARDIAC INDEX: 2.02 L/MIN/M2
DOP CALC LVOT CARDIAC OUTPUT: 3.72 L/MIN
DOP CALC LVOT DIAMETER: 1.9 CM
DOP CALC LVOT PEAK VEL VTI: 22.91 CM
DOP CALC LVOT PEAK VEL: 1.04 M/S
DOP CALC LVOT STROKE INDEX: 37 ML/M2
DOP CALC LVOT STROKE VOLUME: 64.92
E WAVE DECELERATION TIME: 212 MS
E/A RATIO: 0.89
LAAS-AP2: 17 CM2
LAAS-AP4: 17 CM2
LEFT ATRIUM SIZE: 3.9 CM
LEFT ATRIUM VOLUME (MOD BIPLANE): 47 ML
LEFT ATRIUM VOLUME INDEX (MOD BIPLANE): 25.5 ML/M2
MV E'TISSUE VEL-LAT: 6 CM/S
MV E'TISSUE VEL-SEP: 6 CM/S
MV PEAK A VEL: 0.74 M/S
MV PEAK E VEL: 66 CM/S
RIGHT ATRIAL 2D VOLUME: 35 ML
RIGHT ATRIUM AREA SYSTOLE A4C: 14 CM2
RIGHT VENTRICLE ID DIMENSION: 2.9 CM
SL CV LEFT ATRIUM LENGTH A2C: 5 CM
SL CV LV EF: 65
TRICUSPID ANNULAR PLANE SYSTOLIC EXCURSION: 2 CM

## 2024-12-31 PROCEDURE — 93306 TTE W/DOPPLER COMPLETE: CPT

## 2024-12-31 PROCEDURE — 72100 X-RAY EXAM L-S SPINE 2/3 VWS: CPT

## 2024-12-31 PROCEDURE — 93306 TTE W/DOPPLER COMPLETE: CPT | Performed by: STUDENT IN AN ORGANIZED HEALTH CARE EDUCATION/TRAINING PROGRAM

## 2025-01-04 DIAGNOSIS — F41.9 ANXIETY: ICD-10-CM

## 2025-01-06 RX ORDER — HYDROXYZINE HYDROCHLORIDE 25 MG/1
25 TABLET, FILM COATED ORAL EVERY 6 HOURS
Qty: 120 TABLET | Refills: 0 | Status: SHIPPED | OUTPATIENT
Start: 2025-01-06

## 2025-01-26 ENCOUNTER — APPOINTMENT (EMERGENCY)
Dept: CT IMAGING | Facility: HOSPITAL | Age: 65
End: 2025-01-26
Payer: COMMERCIAL

## 2025-01-26 ENCOUNTER — HOSPITAL ENCOUNTER (EMERGENCY)
Facility: HOSPITAL | Age: 65
Discharge: HOME | End: 2025-01-26
Attending: EMERGENCY MEDICINE
Payer: COMMERCIAL

## 2025-01-26 VITALS
SYSTOLIC BLOOD PRESSURE: 144 MMHG | DIASTOLIC BLOOD PRESSURE: 96 MMHG | RESPIRATION RATE: 20 BRPM | BODY MASS INDEX: 31.5 KG/M2 | TEMPERATURE: 97.8 F | WEIGHT: 177.8 LBS | OXYGEN SATURATION: 98 % | HEART RATE: 57 BPM

## 2025-01-26 DIAGNOSIS — R20.2 PARESTHESIAS: ICD-10-CM

## 2025-01-26 DIAGNOSIS — H53.8 BLURRY VISION, BILATERAL: Primary | ICD-10-CM

## 2025-01-26 LAB
ALBUMIN SERPL BCG-MCNC: 4.9 G/DL (ref 3.5–5)
ALP SERPL-CCNC: 49 U/L (ref 34–104)
ALT SERPL W P-5'-P-CCNC: 12 U/L (ref 7–52)
ANION GAP SERPL CALCULATED.3IONS-SCNC: 10 MMOL/L (ref 4–13)
AST SERPL W P-5'-P-CCNC: 14 U/L (ref 13–39)
BASOPHILS # BLD AUTO: 0.03 THOUSANDS/ΜL (ref 0–0.1)
BASOPHILS NFR BLD AUTO: 1 % (ref 0–1)
BILIRUB SERPL-MCNC: 0.54 MG/DL (ref 0.2–1)
BUN SERPL-MCNC: 7 MG/DL (ref 5–25)
CALCIUM SERPL-MCNC: 9.7 MG/DL (ref 8.4–10.2)
CHLORIDE SERPL-SCNC: 104 MMOL/L (ref 96–108)
CO2 SERPL-SCNC: 26 MMOL/L (ref 21–32)
CREAT SERPL-MCNC: 0.91 MG/DL (ref 0.6–1.3)
EOSINOPHIL # BLD AUTO: 0.03 THOUSAND/ΜL (ref 0–0.61)
EOSINOPHIL NFR BLD AUTO: 1 % (ref 0–6)
ERYTHROCYTE [DISTWIDTH] IN BLOOD BY AUTOMATED COUNT: 15.5 % (ref 11.6–15.1)
GFR SERPL CREATININE-BSD FRML MDRD: 66 ML/MIN/1.73SQ M
GLUCOSE SERPL-MCNC: 66 MG/DL (ref 65–140)
GLUCOSE SERPL-MCNC: 94 MG/DL (ref 65–140)
HCT VFR BLD AUTO: 37.7 % (ref 34.8–46.1)
HGB BLD-MCNC: 12.2 G/DL (ref 11.5–15.4)
IMM GRANULOCYTES # BLD AUTO: 0.01 THOUSAND/UL (ref 0–0.2)
IMM GRANULOCYTES NFR BLD AUTO: 0 % (ref 0–2)
LYMPHOCYTES # BLD AUTO: 2.06 THOUSANDS/ΜL (ref 0.6–4.47)
LYMPHOCYTES NFR BLD AUTO: 35 % (ref 14–44)
MCH RBC QN AUTO: 28.4 PG (ref 26.8–34.3)
MCHC RBC AUTO-ENTMCNC: 32.4 G/DL (ref 31.4–37.4)
MCV RBC AUTO: 88 FL (ref 82–98)
MONOCYTES # BLD AUTO: 0.45 THOUSAND/ΜL (ref 0.17–1.22)
MONOCYTES NFR BLD AUTO: 8 % (ref 4–12)
NEUTROPHILS # BLD AUTO: 3.39 THOUSANDS/ΜL (ref 1.85–7.62)
NEUTS SEG NFR BLD AUTO: 55 % (ref 43–75)
NRBC BLD AUTO-RTO: 0 /100 WBCS
PLATELET # BLD AUTO: 295 THOUSANDS/UL (ref 149–390)
PMV BLD AUTO: 9.4 FL (ref 8.9–12.7)
POTASSIUM SERPL-SCNC: 4 MMOL/L (ref 3.5–5.3)
PROT SERPL-MCNC: 7.9 G/DL (ref 6.4–8.4)
RBC # BLD AUTO: 4.29 MILLION/UL (ref 3.81–5.12)
SODIUM SERPL-SCNC: 140 MMOL/L (ref 135–147)
WBC # BLD AUTO: 5.97 THOUSAND/UL (ref 4.31–10.16)

## 2025-01-26 PROCEDURE — 99285 EMERGENCY DEPT VISIT HI MDM: CPT | Performed by: PHYSICIAN ASSISTANT

## 2025-01-26 PROCEDURE — 99285 EMERGENCY DEPT VISIT HI MDM: CPT

## 2025-01-26 PROCEDURE — 93005 ELECTROCARDIOGRAM TRACING: CPT

## 2025-01-26 PROCEDURE — 70498 CT ANGIOGRAPHY NECK: CPT

## 2025-01-26 PROCEDURE — 36415 COLL VENOUS BLD VENIPUNCTURE: CPT | Performed by: PHYSICIAN ASSISTANT

## 2025-01-26 PROCEDURE — 85025 COMPLETE CBC W/AUTO DIFF WBC: CPT | Performed by: PHYSICIAN ASSISTANT

## 2025-01-26 PROCEDURE — 82948 REAGENT STRIP/BLOOD GLUCOSE: CPT

## 2025-01-26 PROCEDURE — 80053 COMPREHEN METABOLIC PANEL: CPT | Performed by: PHYSICIAN ASSISTANT

## 2025-01-26 PROCEDURE — 70496 CT ANGIOGRAPHY HEAD: CPT

## 2025-01-26 RX ADMIN — IOHEXOL 100 ML: 350 INJECTION, SOLUTION INTRAVENOUS at 14:58

## 2025-01-26 NOTE — DISCHARGE INSTRUCTIONS
Your testing here in the emergency department today for blurry vision and fingertip tingling/numbness did not show any acute abnormalities specifically your blood work and a CT scan of the head and neck and blood vessels did not show any acute concerns.  This does not mean nothing is wrong, we recommend you follow-up with an ophthalmologist and a family care provider for further testing to find the final diagnosis.  Please return immediately to the emergency department if you lose vision, develop slurred speech or one-sided weakness or for any other severe concerns.  Otherwise make sure you follow-up with an eye doctor and your family care provider, phone number has been provided for both of these outpatient offices.

## 2025-01-26 NOTE — ED PROVIDER NOTES
Time reflects when diagnosis was documented in both MDM as applicable and the Disposition within this note       Time User Action Codes Description Comment    1/26/2025  3:44 PM Lizet Banda Add [H53.8] Blurry vision, bilateral     1/26/2025  3:44 PM Lizet Banda Add [R20.2] Paresthesias           ED Disposition       ED Disposition   Discharge    Condition   Stable    Date/Time   Sun Jan 26, 2025  3:44 PM    Comment   Latoya Lewis discharge to home/self care.                   Assessment & Plan       Medical Decision Making  64-year-old female presenting for evaluation of 2 weeks of blurry vision and distal fingertip tingling is accompanied by her significant other who reports the patient does have dementia and will intermittently allow for healthcare visits for which reason they did not present earlier.  No other associated symptoms reported, no slurred speech, no facial weakness, no unilateral weakness, sensory deficits or paralysis.  Patient denies headaches, dizziness, lightheadedness, chest pain or other associated symptoms.    Frontal diagnosis includes but is not limited to hyperglycemia, electrolyte disturbances hyponatremia, elevated blood pressure/hypertensive emergency, CVA, acute angle-closure glaucoma and others.    Physical exam is reassuring with an NIH stroke scale 0, no focal neurologic deficits noted, blood work demonstrates no evidence for anemia or metabolic disturbances.  CT imaging to evaluate for potential vascular cause demonstrates no acute abnormalities -previously noted and known anterior left temporal lobe volume loss is again identified.  Intraocular pressure obtained bilaterally noted to be 17 in left eye and 14 in right .     Patient patient significant other advised to follow-up with family care provider and ophthalmology.  All imaging and/or lab testing discussed with patient, strict return to ED precautions discussed. Patient and/or family members verbalizes  "understanding and agrees with plan. Patient is stable for discharge     Portions of the record may have been created with voice recognition software. Occasional wrong word or \"sound a like\" substitutions may have occurred due to the inherent limitations of voice recognition software. Read the chart carefully and recognize, using context, where substitutions have occurred.        Amount and/or Complexity of Data Reviewed  Labs: ordered.  Radiology: ordered.    Risk  Prescription drug management.        ED Course as of 01/26/25 1548   Sun Jan 26, 2025   1543      CT Brain: No acute intracranial pathology. Chronic microangiopathy. Stable asymmetric volume loss in the anterior left temporal lobe.     CT Angiography: No significant stenosis of the cervical carotid or vertebral arteries. No high-grade intracranial stenosis, large vessel occlusion or aneurysm.        1543 Patient and patient's significant other educated on unremarkable test results and CT imaging findings advised to follow-up with ophthalmology and family care provider.       Medications   iohexol (OMNIPAQUE) 350 MG/ML injection (MULTI-DOSE) 100 mL (100 mL Intravenous Given 1/26/25 1458)       ED Risk Strat Scores                          SBIRT 22yo+      Flowsheet Row Most Recent Value   Initial Alcohol Screen: US AUDIT-C     1. How often do you have a drink containing alcohol? 0 Filed at: 01/26/2025 1353   2. How many drinks containing alcohol do you have on a typical day you are drinking?  0 Filed at: 01/26/2025 1353   3a. Male UNDER 65: How often do you have five or more drinks on one occasion? 0 Filed at: 01/26/2025 1353   3b. FEMALE Any Age, or MALE 65+: How often do you have 4 or more drinks on one occassion? 0 Filed at: 01/26/2025 1353   Audit-C Score 0 Filed at: 01/26/2025 1353   VANIA: How many times in the past year have you...    Used an illegal drug or used a prescription medication for non-medical reasons? Never Filed at: 01/26/2025 1353      "                       History of Present Illness       Chief Complaint   Patient presents with    Blurred Vision     Patient's  says she has had blurred vision in both eyes and redness to L eye and tingling in b/l hands for 2 weeks; hx of dementia       Past Medical History:   Diagnosis Date    Anxiety     Chronic pain     back pain     Depression 2024    Diabetes mellitus (HCC)     Disease of thyroid gland 2024    hypothyroid     Hyperlipidemia     Hypertension     Psychiatric disorder 2024      Past Surgical History:   Procedure Laterality Date    DENTAL SURGERY      HYSTERECTOMY      over 20 years ago    VA EXCISION TUMOR SOFT TIS BACK/FLANK SUBQ 3 CM/> Right 2024    Procedure: UPPER BACK EXCISION  BX LESION/MASS;  Surgeon: Jade Farah MD;  Location: [facility];  Service: General      Family History   Problem Relation Age of Onset    Dementia Mother     Cancer Maternal Aunt     Cancer Maternal Uncle       Social History     Tobacco Use    Smoking status: Former     Current packs/day: 0.00     Average packs/day: 0.5 packs/day for 40.0 years (20.0 ttl pk-yrs)     Types: Cigarettes     Start date: 1983     Quit date: 2023     Years since quittin.7     Passive exposure: Past    Smokeless tobacco: Never   Vaping Use    Vaping status: Never Used   Substance Use Topics    Alcohol use: Yes     Comment: socially     Drug use: Never      E-Cigarette/Vaping    E-Cigarette Use Never User       E-Cigarette/Vaping Substances    Nicotine No     THC No     CBD No     Flavoring No     Other No     Unknown No       I have reviewed and agree with the history as documented.     64-year-old female past history significant for anxiety, depression, diabetes, hypothyroidism, hyperlipidemia, hypertension and psychiatric disorder presenting for evaluation of 2 weeks of blurry vision, reported fingertip tingling that has been intermittent and ongoing according to patient significant other in the  room.  Patient reports she has no pain, dizziness, lightheadedness, chest pain, dyspnea, abdominal pain, nausea, vomiting, diarrhea.  No recent illness, fevers, chills noted.  No recent new medications started.  No ill contacts.  No known ophthalmologic disorders reported.            Review of Systems   Constitutional:  Negative for chills, fatigue and fever.   HENT:  Negative for congestion, ear pain, rhinorrhea and sore throat.    Eyes:  Positive for visual disturbance. Negative for redness.   Respiratory:  Negative for chest tightness and shortness of breath.    Cardiovascular:  Negative for chest pain and palpitations.   Gastrointestinal:  Negative for abdominal pain, nausea and vomiting.   Genitourinary:  Negative for dysuria and hematuria.   Musculoskeletal: Negative.    Skin:  Negative for rash.   Neurological:  Negative for dizziness, syncope, light-headedness and numbness.           Objective       ED Triage Vitals [01/26/25 1355]   Temperature Pulse Blood Pressure Respirations SpO2 Patient Position - Orthostatic VS   97.8 °F (36.6 °C) 57 144/96 20 98 % Sitting      Temp Source Heart Rate Source BP Location FiO2 (%) Pain Score    Oral Monitor Left arm -- --      Vitals      Date and Time Temp Pulse SpO2 Resp BP Pain Score FACES Pain Rating User   01/26/25 1355 97.8 °F (36.6 °C) 57 98 % 20 144/96 -- -- [place]            Physical Exam  Vitals and nursing note reviewed.   Constitutional:       Appearance: She is well-developed.   HENT:      Head: Normocephalic.   Eyes:      General: Lids are normal. No scleral icterus.     Intraocular pressure: Right eye pressure is 14 mmHg. Left eye pressure is 17 mmHg.      Extraocular Movements: Extraocular movements intact.      Comments: Pupils are equal and round reactive to light normal extraocular movements without nystagmus or strabismus.   Cardiovascular:      Rate and Rhythm: Normal rate and regular rhythm.   Pulmonary:      Effort: Pulmonary effort is normal.       Breath sounds: Normal breath sounds. No stridor.   Abdominal:      General: There is no distension.      Palpations: Abdomen is soft.      Tenderness: There is no abdominal tenderness.   Musculoskeletal:         General: Normal range of motion.   Skin:     General: Skin is warm and dry.      Capillary Refill: Capillary refill takes less than 2 seconds.   Neurological:      Mental Status: She is alert and oriented to person, place, and time.      Comments: GCS 15. AAOx4. No focal neuro deficits. CN II-XII intact. PERRL. EOMI. No pronator drift.  strength 5/5 bilaterally. B/L UE strength 5/5 throughout. Finger to nose, heel shin, rapid alternating movements Cerebellar function normal. Ambulates without difficulty. B/L LE strength 5/5 throughout. Gross sensation to b/l upper and lower extremities intact.    Patient specifically denies numbness or tingling at the fingertips on exam and has normal gross sensation with brisk capillary refill and full range of motion to bilateral hands.         Results Reviewed       Procedure Component Value Units Date/Time    Fingerstick Glucose (POCT) [[id number]]  (Normal) Collected: 01/26/25 1506    Lab Status: Final result Specimen: Blood Updated: 01/26/25 1507     POC Glucose 66 mg/dl     Comprehensive metabolic panel [[id number]] Collected: 01/26/25 1429    Lab Status: Final result Specimen: Blood from Arm, Right Updated: 01/26/25 1454     Sodium 140 mmol/L      Potassium 4.0 mmol/L      Chloride 104 mmol/L      CO2 26 mmol/L      ANION GAP 10 mmol/L      BUN 7 mg/dL      Creatinine 0.91 mg/dL      Glucose 94 mg/dL      Calcium 9.7 mg/dL      AST 14 U/L      ALT 12 U/L      Alkaline Phosphatase 49 U/L      Total Protein 7.9 g/dL      Albumin 4.9 g/dL      Total Bilirubin 0.54 mg/dL      eGFR 66 ml/min/1.73sq m     Narrative:      National Kid[employer] Foundation guidelines for Chronic Kidney Disease (CKD):     Stage 1 with normal or high GFR (GFR > 90 mL/min/1.73 square  meters)    Stage 2 Mild CKD (GFR = 60-89 mL/min/1.73 square meters)    Stage 3A Moderate CKD (GFR = 45-59 mL/min/1.73 square meters)    Stage 3B Moderate CKD (GFR = 30-44 mL/min/1.73 square meters)    Stage 4 Severe CKD (GFR = 15-29 mL/min/1.73 square meters)    Stage 5 End Stage CKD (GFR <15 mL/min/1.73 square meters)  Note: GFR calculation is accurate only with a steady state creatinine    CBC and differential [[id number]]  (Abnormal) Collected: 01/26/25 1429    Lab Status: Final result Specimen: Blood from Arm, Right Updated: 01/26/25 1439     WBC 5.97 Thousand/uL      RBC 4.29 Million/uL      Hemoglobin 12.2 g/dL      Hematocrit 37.7 %      MCV 88 fL      MCH 28.4 pg      MCHC 32.4 g/dL      RDW 15.5 %      MPV 9.4 fL      Platelets 295 Thousands/uL      nRBC 0 /100 WBCs      Segmented % 55 %      Immature Grans % 0 %      Lymphocytes % 35 %      Monocytes % 8 %      Eosinophils Relative 1 %      Basophils Relative 1 %      Absolute Neutrophils 3.39 Thousands/µL      Absolute Immature Grans 0.01 Thousand/uL      Absolute Lymphocytes 2.06 Thousands/µL      Absolute Monocytes 0.45 Thousand/µL      Eosinophils Absolute 0.03 Thousand/µL      Basophils Absolute 0.03 Thousands/µL             CTA head and neck with and without contrast   Final Interpretation by E. Alec Schoenberger, MD (01/26 1539)      CT Brain: No acute intracranial pathology. Chronic microangiopathy. Stable asymmetric volume loss in the anterior left temporal lobe.      CT Angiography: No significant stenosis of the cervical carotid or vertebral arteries. No high-grade intracranial stenosis, large vessel occlusion or aneurysm.                  Workstation performed: [id number]             ECG 12 Lead Documentation Only    Date/Time: 1/26/2025 3:08 PM    Performed by: Lizet Banda PA-C  Authorized by: Lizet Banda PA-C    Indications / Diagnosis:  Blurry vision  ECG reviewed by me, the ED Provider: yes    Patient location:   ED  Previous ECG:     Comparison to cardiac monitor: Yes    Interpretation:     Interpretation: normal    Rate:     ECG rate:  59    ECG rate assessment: bradycardic    Rhythm:     Rhythm: sinus bradycardia    Ectopy:     Ectopy: none    QRS:     QRS axis:  Normal    QRS intervals:  Normal  Conduction:     Conduction: normal    ST segments:     ST segments:  Normal  T waves:     T waves: normal    Comments:        QRS 72          ED Medication and Procedure Management   Prior to Admission Medications   Prescriptions Last Dose Informant Patient Reported? Taking?   amLODIPine (NORVASC) 5 mg tablet   No No   Sig: Take 1 tablet (5 mg total) by mouth daily   erythromycin (ILOTYCIN) ophthalmic ointment   No No   Sig: Place a 1/2 inch ribbon of ointment into the right lower eyelid.  Apply twice a day for 5 days   hydrOXYzine HCL (ATARAX) 25 mg tablet   No No   Sig: TAKE 1 TABLET (25 MG TOTAL) BY MOUTH EVERY 6 (SIX) HOURS   ibuprofen (MOTRIN) 600 mg tablet   No No   Sig: Take 1 tablet (600 mg total) by mouth every 6 (six) hours as needed for mild pain   rosuvastatin (CRESTOR) 20 MG tablet   No No   Sig: Take 1 tablet (20 mg total) by mouth daily   sertraline (ZOLOFT) 25 mg tablet   No No   Sig: Take 2 tablets (50 mg total) by mouth daily      Facility-Administered Medications: None     Patient's Medications   Discharge Prescriptions    No medications on file     No discharge procedures on file.  ED SEPSIS DOCUMENTATION   Time reflects when diagnosis was documented in both MDM as applicable and the Disposition within this note       Time User Action Codes Description Comment    1/26/2025  3:44 PM Lizet Banda [H53.8] Blurry vision, bilateral     1/26/2025  3:44 PM Lizet Banda [R20.2] Paresthesias                  Lizet Banda PA-C  01/26/25 5207

## 2025-01-27 LAB
ATRIAL RATE: 59 BPM
P AXIS: 48 DEGREES
PR INTERVAL: 162 MS
QRS AXIS: 38 DEGREES
QRSD INTERVAL: 72 MS
QT INTERVAL: 430 MS
QTC INTERVAL: 426 MS
T WAVE AXIS: 88 DEGREES
VENTRICULAR RATE: 59 BPM

## 2025-01-27 PROCEDURE — 93010 ELECTROCARDIOGRAM REPORT: CPT | Performed by: INTERNAL MEDICINE

## 2025-01-28 ENCOUNTER — OFFICE VISIT (OUTPATIENT)
Dept: FAMILY MEDICINE CLINIC | Facility: CLINIC | Age: 65
End: 2025-01-28

## 2025-01-28 ENCOUNTER — TELEPHONE (OUTPATIENT)
Dept: FAMILY MEDICINE CLINIC | Facility: CLINIC | Age: 65
End: 2025-01-28

## 2025-01-28 VITALS
DIASTOLIC BLOOD PRESSURE: 68 MMHG | SYSTOLIC BLOOD PRESSURE: 110 MMHG | HEART RATE: 62 BPM | OXYGEN SATURATION: 98 % | TEMPERATURE: 96.8 F | RESPIRATION RATE: 18 BRPM | BODY MASS INDEX: 31.61 KG/M2 | HEIGHT: 63 IN | WEIGHT: 178.4 LBS

## 2025-01-28 DIAGNOSIS — I10 HYPERTENSION: ICD-10-CM

## 2025-01-28 DIAGNOSIS — F41.9 ANXIETY: ICD-10-CM

## 2025-01-28 DIAGNOSIS — E78.2 MIXED HYPERLIPIDEMIA: ICD-10-CM

## 2025-01-28 DIAGNOSIS — M54.9 CHRONIC BACK PAIN: ICD-10-CM

## 2025-01-28 DIAGNOSIS — G89.29 CHRONIC BACK PAIN: ICD-10-CM

## 2025-01-28 DIAGNOSIS — F32.A DEPRESSION, UNSPECIFIED DEPRESSION TYPE: Primary | ICD-10-CM

## 2025-01-28 PROCEDURE — 99213 OFFICE O/P EST LOW 20 MIN: CPT | Performed by: FAMILY MEDICINE

## 2025-01-28 RX ORDER — IBUPROFEN 600 MG/1
600 TABLET, FILM COATED ORAL EVERY 6 HOURS PRN
Qty: 30 TABLET | Refills: 1 | Status: SHIPPED | OUTPATIENT
Start: 2025-01-28

## 2025-01-28 RX ORDER — AMLODIPINE BESYLATE 5 MG/1
5 TABLET ORAL DAILY
Qty: 90 TABLET | Refills: 1 | Status: SHIPPED | OUTPATIENT
Start: 2025-01-28 | End: 2025-01-29 | Stop reason: SDUPTHER

## 2025-01-28 NOTE — TELEPHONE ENCOUNTER
Patient is requesting for the following medication to be sent to the updated pharmacy. Medications were sent to different pharmacies.      amLODIPine (NORVASC) 5 mg tablet

## 2025-01-28 NOTE — ASSESSMENT & PLAN NOTE
Lab Results   Component Value Date    CHOLESTEROL 235 (H) 10/07/2024    CHOLESTEROL 237 (H) 11/06/2023    CHOLESTEROL 220 (H) 11/09/2022     Lab Results   Component Value Date    HDL 51 10/07/2024    HDL 52 11/06/2023    HDL 56 11/09/2022     Lab Results   Component Value Date    TRIG 114 10/07/2024    TRIG 90 11/06/2023    TRIG 119 11/09/2022     Lab Results   Component Value Date    NONHDLC 184 10/07/2024    NONHDLC 185 11/06/2023   Last  (10/7/2024)  ASCVD 29.4%  Increased rosuvastatin dose from 10 to 20 mg (12/13/24)  Compliant to meds  To recheck Lipid panel after 4-6 weeks on treatment    Plan  Recheck lipid panel

## 2025-01-28 NOTE — ASSESSMENT & PLAN NOTE
Asymptomatic  Palpitations not occurring for months now (  unable to quantify ho long)    Plan  Cont monitoring on subsequent office visits

## 2025-01-28 NOTE — ASSESSMENT & PLAN NOTE
Lab Results   Component Value Date    CHOLESTEROL 235 (H) 10/07/2024    CHOLESTEROL 237 (H) 11/06/2023    CHOLESTEROL 220 (H) 11/09/2022     Lab Results   Component Value Date    HDL 51 10/07/2024    HDL 52 11/06/2023    HDL 56 11/09/2022     Lab Results   Component Value Date    TRIG 114 10/07/2024    TRIG 90 11/06/2023    TRIG 119 11/09/2022     Lab Results   Component Value Date    NONHDLC 184 10/07/2024    NONHDLC 185 11/06/2023   Last  (10/7/2024)  ASCVD 29.4%  Increased rosuvastatin dose from 10 to 20 mg (12/13/24)  Compliant to meds  Home meds rosuvastatin 20 mg    Plan  Cont home med  Recheck lipid panel in 3 months

## 2025-01-28 NOTE — PROGRESS NOTES
Name: Latoya Lewis      : 1960      MRN: [id number]  Encounter Provider: Evy Briscoe MD  Encounter Date: 2025   Encounter department: [facility]  :  Assessment & Plan  Depression, unspecified depression type  Asymptomatic  Stopped taking zoloft due to side effect of diarrhea  Instead takes over the counter CBD pills and herbal formulas.    Plan  Discontinue zoloft  Cont monitoring on next office visit       Anxiety  Asymptomatic  Palpitations not occurring for months now (  unable to quantify ho long)    Plan  Cont monitoring on subsequent office visits       Mixed hyperlipidemia  Lab Results   Component Value Date    CHOLESTEROL 235 (H) 10/07/2024    CHOLESTEROL 237 (H) 2023    CHOLESTEROL 220 (H) 2022     Lab Results   Component Value Date    HDL 51 10/07/2024    HDL 52 2023    HDL 56 2022     Lab Results   Component Value Date    TRIG 114 10/07/2024    TRIG 90 2023    TRIG 119 2022     Lab Results   Component Value Date    NONHDLC 184 10/07/2024    NONHDLC 185 2023   Last  (10/7/2024)  ASCVD 29.4%  Increased rosuvastatin dose from 10 to 20 mg (24)  Compliant to meds  Home meds rosuvastatin 20 mg    Plan  Cont home med  Recheck lipid panel in 3 months    Hypertension  Well controlled  Asymptomatic  No complaints of visual disturbances  Due for opthalmology check up this week (Thursday), after presenting to ED with visual blurriness, but BP was normotensive  Home med: amlodipine 5 mg QD    Plan  Cont home med  Orders:    amLODIPine (NORVASC) 5 mg tablet; Take 1 tablet (5 mg total) by mouth daily    Chronic back pain  Ongoing for years- worsening  No red flag signs  Last ibuprofen dose over a month ago    Plan  Take ibuprofen 600 mg as needed, however counseled on food intake along with it.  Given print out on back exercises   Orders:    ibuprofen (MOTRIN) 600 mg tablet; Take 1 tablet (600 mg total) by  mouth every 6 (six) hours as needed for mild pain      BMI Counseling: Body mass index is 31.6 kg/m². The BMI is above normal. Nutrition recommendations include encouraging healthy choices of fruits and vegetables, decreasing fast food intake, consuming healthier snacks and limiting drinks that contain sugar. Rationale for BMI follow-up plan is due to patient being overweight or obese.       History of Present Illness   Patient is a 63 y/o male with PMH HTN, HLD, Chronic back pain presents today as regular PCP  follow up post starting zoloft medications since December 2024.    According to the patient's , she started to have ongoing frequent diarrhea for a week after starting the medication. She then stopped taking it , and has only last stopped having diarrhea for about the past 2 weeks. She denies any blood associated, nausea, abdominal pain or recent diet changes/ travel prior. Instead she has been taking CBD and herbal formulas for her mood disorders. Her  states has not noticed any recent depression or anxiety since starting the herbal medications.                Review of Systems   Constitutional:  Negative for activity change, appetite change, chills, diaphoresis, fatigue and fever.   HENT:  Negative for congestion, ear discharge, ear pain and rhinorrhea.    Eyes:  Negative for photophobia, pain, redness, itching and visual disturbance.   Respiratory:  Negative for cough, chest tightness and shortness of breath.    Cardiovascular:  Negative for chest pain and palpitations.   Gastrointestinal:  Negative for constipation, diarrhea, nausea and vomiting.   Endocrine: Positive for polyuria.   Genitourinary:  Negative for dysuria, frequency and hematuria.   Musculoskeletal:  Positive for back pain. Negative for myalgias and neck pain.   Neurological:  Negative for weakness, light-headedness, numbness and headaches.   Hematological: Negative.    Psychiatric/Behavioral:  Positive for confusion. Negative  "for agitation.        Objective   /68 (BP Location: Left arm, Patient Position: Sitting, Cuff Size: Standard)   Pulse 62   Temp (!) 96.8 °F (36 °C) (Temporal)   Resp 18   Ht 5' 3\" (1.6 m)   Wt 80.9 kg (178 lb 6.4 oz)   SpO2 98%   BMI 31.60 kg/m²      Physical Exam  Vitals and nursing note reviewed.   Constitutional:       Appearance: Normal appearance.   HENT:      Nose: No congestion or rhinorrhea.      Mouth/Throat:      Mouth: Mucous membranes are moist.   Eyes:      Conjunctiva/sclera: Conjunctivae normal.   Cardiovascular:      Rate and Rhythm: Normal rate and regular rhythm.      Pulses: Normal pulses.      Heart sounds: Normal heart sounds.   Pulmonary:      Effort: Pulmonary effort is normal.      Breath sounds: Normal breath sounds.   Musculoskeletal:      Cervical back: Normal range of motion.   Skin:     General: Skin is warm.   Neurological:      Mental Status: She is alert. Mental status is at baseline.   Psychiatric:      Comments: She seems rather confused  & unsure about questions asked about her (not anything new)         "

## 2025-01-28 NOTE — ASSESSMENT & PLAN NOTE
Asymptomatic  Stopped taking zoloft due to side effect of diarrhea  Instead takes over the counter CBD pills and herbal formulas.    Plan  Discontinue zoloft  Cont monitoring on next office visit

## 2025-01-29 DIAGNOSIS — I10 HYPERTENSION: ICD-10-CM

## 2025-01-29 RX ORDER — AMLODIPINE BESYLATE 5 MG/1
5 TABLET ORAL DAILY
Qty: 90 TABLET | Refills: 1 | Status: SHIPPED | OUTPATIENT
Start: 2025-01-29

## 2025-01-29 NOTE — ASSESSMENT & PLAN NOTE
Well controlled  Asymptomatic  No complaints of visual disturbances  Due for opthalmology check up this week (Thursday), after presenting to ED with visual blurriness, but BP was normotensive  Home med: amlodipine 5 mg QD    Plan  Cont home med  Orders:    amLODIPine (NORVASC) 5 mg tablet; Take 1 tablet (5 mg total) by mouth daily

## 2025-01-29 NOTE — ASSESSMENT & PLAN NOTE
Ongoing for years- worsening  No red flag signs  Last ibuprofen dose over a month ago    Plan  Take ibuprofen 600 mg as needed, however counseled on food intake along with it.  Given print out on back exercises   Orders:    ibuprofen (MOTRIN) 600 mg tablet; Take 1 tablet (600 mg total) by mouth every 6 (six) hours as needed for mild pain

## 2025-01-31 DIAGNOSIS — F41.9 ANXIETY: ICD-10-CM

## 2025-02-04 RX ORDER — HYDROXYZINE HYDROCHLORIDE 25 MG/1
25 TABLET, FILM COATED ORAL EVERY 6 HOURS
Qty: 120 TABLET | Refills: 0 | Status: SHIPPED | OUTPATIENT
Start: 2025-02-04

## 2025-02-06 ENCOUNTER — APPOINTMENT (EMERGENCY)
Dept: RADIOLOGY | Facility: HOSPITAL | Age: 65
End: 2025-02-06
Payer: COMMERCIAL

## 2025-02-06 ENCOUNTER — HOSPITAL ENCOUNTER (EMERGENCY)
Facility: HOSPITAL | Age: 65
Discharge: HOME | End: 2025-02-06
Payer: COMMERCIAL

## 2025-02-06 VITALS
TEMPERATURE: 97.6 F | SYSTOLIC BLOOD PRESSURE: 142 MMHG | DIASTOLIC BLOOD PRESSURE: 84 MMHG | HEART RATE: 56 BPM | BODY MASS INDEX: 31.98 KG/M2 | WEIGHT: 180.56 LBS | OXYGEN SATURATION: 94 % | RESPIRATION RATE: 20 BRPM

## 2025-02-06 DIAGNOSIS — R07.9 CHEST PAIN: Primary | ICD-10-CM

## 2025-02-06 LAB
ALBUMIN SERPL BCG-MCNC: 4.7 G/DL (ref 3.5–5)
ALP SERPL-CCNC: 56 U/L (ref 34–104)
ALT SERPL W P-5'-P-CCNC: 10 U/L (ref 7–52)
ANION GAP SERPL CALCULATED.3IONS-SCNC: 8 MMOL/L (ref 4–13)
AST SERPL W P-5'-P-CCNC: 15 U/L (ref 13–39)
ATRIAL RATE: 55 BPM
BASOPHILS # BLD AUTO: 0.03 THOUSANDS/ΜL (ref 0–0.1)
BASOPHILS NFR BLD AUTO: 0 % (ref 0–1)
BILIRUB SERPL-MCNC: 0.4 MG/DL (ref 0.2–1)
BUN SERPL-MCNC: 9 MG/DL (ref 5–25)
CALCIUM SERPL-MCNC: 9.6 MG/DL (ref 8.4–10.2)
CARDIAC TROPONIN I PNL SERPL HS: 4 NG/L (ref ?–50)
CHLORIDE SERPL-SCNC: 104 MMOL/L (ref 96–108)
CO2 SERPL-SCNC: 29 MMOL/L (ref 21–32)
CREAT SERPL-MCNC: 0.99 MG/DL (ref 0.6–1.3)
EOSINOPHIL # BLD AUTO: 0.04 THOUSAND/ΜL (ref 0–0.61)
EOSINOPHIL NFR BLD AUTO: 1 % (ref 0–6)
ERYTHROCYTE [DISTWIDTH] IN BLOOD BY AUTOMATED COUNT: 15.5 % (ref 11.6–15.1)
GFR SERPL CREATININE-BSD FRML MDRD: 60 ML/MIN/1.73SQ M
GLUCOSE SERPL-MCNC: 85 MG/DL (ref 65–140)
HCT VFR BLD AUTO: 37.1 % (ref 34.8–46.1)
HGB BLD-MCNC: 12.1 G/DL (ref 11.5–15.4)
IMM GRANULOCYTES # BLD AUTO: 0.02 THOUSAND/UL (ref 0–0.2)
IMM GRANULOCYTES NFR BLD AUTO: 0 % (ref 0–2)
LYMPHOCYTES # BLD AUTO: 2.59 THOUSANDS/ΜL (ref 0.6–4.47)
LYMPHOCYTES NFR BLD AUTO: 38 % (ref 14–44)
MCH RBC QN AUTO: 28.8 PG (ref 26.8–34.3)
MCHC RBC AUTO-ENTMCNC: 32.6 G/DL (ref 31.4–37.4)
MCV RBC AUTO: 88 FL (ref 82–98)
MONOCYTES # BLD AUTO: 0.44 THOUSAND/ΜL (ref 0.17–1.22)
MONOCYTES NFR BLD AUTO: 6 % (ref 4–12)
NEUTROPHILS # BLD AUTO: 3.72 THOUSANDS/ΜL (ref 1.85–7.62)
NEUTS SEG NFR BLD AUTO: 55 % (ref 43–75)
NRBC BLD AUTO-RTO: 0 /100 WBCS
P AXIS: 58 DEGREES
PLATELET # BLD AUTO: 281 THOUSANDS/UL (ref 149–390)
PMV BLD AUTO: 9.8 FL (ref 8.9–12.7)
POTASSIUM SERPL-SCNC: 3.6 MMOL/L (ref 3.5–5.3)
PR INTERVAL: 166 MS
PROT SERPL-MCNC: 7.6 G/DL (ref 6.4–8.4)
QRS AXIS: 43 DEGREES
QRSD INTERVAL: 66 MS
QT INTERVAL: 358 MS
QTC INTERVAL: 343 MS
RBC # BLD AUTO: 4.2 MILLION/UL (ref 3.81–5.12)
SODIUM SERPL-SCNC: 141 MMOL/L (ref 135–147)
T WAVE AXIS: 103 DEGREES
VENTRICULAR RATE: 55 BPM
WBC # BLD AUTO: 6.84 THOUSAND/UL (ref 4.31–10.16)

## 2025-02-06 PROCEDURE — 36415 COLL VENOUS BLD VENIPUNCTURE: CPT

## 2025-02-06 PROCEDURE — 84484 ASSAY OF TROPONIN QUANT: CPT

## 2025-02-06 PROCEDURE — 93005 ELECTROCARDIOGRAM TRACING: CPT

## 2025-02-06 PROCEDURE — 99285 EMERGENCY DEPT VISIT HI MDM: CPT

## 2025-02-06 PROCEDURE — 85025 COMPLETE CBC W/AUTO DIFF WBC: CPT

## 2025-02-06 PROCEDURE — 93010 ELECTROCARDIOGRAM REPORT: CPT | Performed by: INTERNAL MEDICINE

## 2025-02-06 PROCEDURE — 71046 X-RAY EXAM CHEST 2 VIEWS: CPT

## 2025-02-06 PROCEDURE — 80053 COMPREHEN METABOLIC PANEL: CPT

## 2025-02-06 PROCEDURE — 96374 THER/PROPH/DIAG INJ IV PUSH: CPT

## 2025-02-06 RX ORDER — KETOROLAC TROMETHAMINE 30 MG/ML
15 INJECTION, SOLUTION INTRAMUSCULAR; INTRAVENOUS ONCE
Status: COMPLETED | OUTPATIENT
Start: 2025-02-06 | End: 2025-02-06

## 2025-02-06 RX ORDER — FAMOTIDINE 20 MG/1
20 TABLET, FILM COATED ORAL ONCE
Status: COMPLETED | OUTPATIENT
Start: 2025-02-06 | End: 2025-02-06

## 2025-02-06 RX ORDER — MAGNESIUM HYDROXIDE/ALUMINUM HYDROXICE/SIMETHICONE 120; 1200; 1200 MG/30ML; MG/30ML; MG/30ML
30 SUSPENSION ORAL ONCE
Status: COMPLETED | OUTPATIENT
Start: 2025-02-06 | End: 2025-02-06

## 2025-02-06 RX ADMIN — KETOROLAC TROMETHAMINE 15 MG: 30 INJECTION, SOLUTION INTRAMUSCULAR; INTRAVENOUS at 15:16

## 2025-02-06 RX ADMIN — ALUMINUM HYDROXIDE, MAGNESIUM HYDROXIDE, AND SIMETHICONE 30 ML: 200; 200; 20 SUSPENSION ORAL at 14:53

## 2025-02-06 RX ADMIN — FAMOTIDINE 20 MG: 20 TABLET, FILM COATED ORAL at 14:53

## 2025-02-06 NOTE — ED PROVIDER NOTES
Time reflects when diagnosis was documented in both MDM as applicable and the Disposition within this note       Time User Action Codes Description Comment    2/6/2025  3:15 PM YokChavez vick Add [R07.9] Chest pain           ED Disposition       ED Disposition   Discharge    Condition   Stable    Date/Time   u Feb 6, 2025  3:15 PM    Comment   Latoya Lewis discharge to home/self care.                   Assessment & Plan       Medical Decision Making  64-year-old female present emergency department due to chest pain and dyspnea.  Labs obtained and x-ray to evaluate for potential etiology such as ACS, pneumonia, pneumothorax, among others.  Labs and EKG/x-ray doubtful for any emergent pathology at this time.  Patient has low risk heart score.  After trial of symptomatic management, patient reports significant improvement after Toradol.  Likely musculoskeletal in etiology.  Discussed with patient and significant other who are agreeable with plan for home management, outpatient follow-up, return precautions for recurrent/worsening symptoms.    Risk  OTC drugs.  Prescription drug management.             Medications   aluminum-magnesium hydroxide-simethicone (MAALOX) oral suspension 30 mL (30 mL Oral Given 2/6/25 1453)   famotidine (PEPCID) tablet 20 mg (20 mg Oral Given 2/6/25 1453)   ketorolac (TORADOL) injection 15 mg (15 mg Intravenous Given 2/6/25 1516)       ED Risk Strat Scores   HEART Risk Score      Flowsheet Row Most Recent Value   Heart Score Risk Calculator    History 0 Filed at: 02/07/2025 1541   ECG 0 Filed at: 02/07/2025 1541   Age 1 Filed at: 02/07/2025 1541   Risk Factors 1 Filed at: 02/07/2025 1541   Troponin 0 Filed at: 02/07/2025 1541   HEART Score 2 Filed at: 02/07/2025 1541          HEART Risk Score      Flowsheet Row Most Recent Value   Heart Score Risk Calculator    History 0 Filed at: 02/07/2025 1541   ECG 0 Filed at: 02/07/2025 1541   Age 1 Filed at: 02/07/2025 1541   Risk Factors 1 Filed  at: 2025 1541   Troponin 0 Filed at: 2025 1541   HEART Score 2 Filed at: 2025 1541                                                History of Present Illness       Chief Complaint   Patient presents with    Chest Pain     Chest pain, SOB, and headache for about 30 min. States that pain radiates to her back       Past Medical History:   Diagnosis Date    Anxiety     Chronic pain     back pain     Depression 2024    Diabetes mellitus (HCC)     Disease of thyroid gland 2024    hypothyroid     Hyperlipidemia     Hypertension     Psychiatric disorder 2024      Past Surgical History:   Procedure Laterality Date    DENTAL SURGERY      HYSTERECTOMY      over 20 years ago    MN EXCISION TUMOR SOFT TIS BACK/FLANK SUBQ 3 CM/> Right 2024    Procedure: UPPER BACK EXCISION  BX LESION/MASS;  Surgeon: Jade Farah MD;  Location: [facility];  Service: General      Family History   Problem Relation Age of Onset    Dementia Mother     Cancer Maternal Aunt     Cancer Maternal Uncle       Social History     Tobacco Use    Smoking status: Former     Current packs/day: 0.00     Average packs/day: 0.5 packs/day for 40.0 years (20.0 ttl pk-yrs)     Types: Cigarettes     Start date: 1983     Quit date: 2023     Years since quittin.7     Passive exposure: Past    Smokeless tobacco: Never   Vaping Use    Vaping status: Never Used   Substance Use Topics    Alcohol use: Yes     Comment: socially     Drug use: Never      E-Cigarette/Vaping    E-Cigarette Use Never User       E-Cigarette/Vaping Substances    Nicotine No     THC No     CBD No     Flavoring No     Other No     Unknown No       I have reviewed and agree with the history as documented.     64-year-old female present emergency department due to onset of chest pain starting a few hours ago.  Had some shortness of breath and a headache with it.  Pain is also present in her back.  Does not radiate elsewhere.  Denies any other associated  symptoms with it.  Has had similar in the past.  Significant other tried giving her medication for her stomach but she did not have significant improvement with this.  She does have some tenderness around the stomach area.        Review of Systems   All other systems reviewed and are negative.          Objective       ED Triage Vitals   Temperature Pulse Blood Pressure Respirations SpO2 Patient Position - Orthostatic VS   02/06/25 1407 02/06/25 1407 02/06/25 1407 02/06/25 1407 02/06/25 1407 02/06/25 1407   97.6 °F (36.4 °C) 56 142/86 (!) 24 97 % Lying      Temp Source Heart Rate Source BP Location FiO2 (%) Pain Score    02/06/25 1407 02/06/25 1407 02/06/25 1407 -- 02/06/25 1530    Oral Monitor Left arm  No Pain      Vitals      Date and Time Temp Pulse SpO2 Resp BP Pain Score FACES Pain Rating User   02/06/25 1530 -- 56 94 % 20 142/84 No Pain -- AS   02/06/25 1407 97.6 °F (36.4 °C) 56 97 % 24 142/86 -- -- CO            Physical Exam  Vitals and nursing note reviewed.   Constitutional:       General: She is not in acute distress.     Appearance: She is well-developed.   HENT:      Head: Normocephalic and atraumatic.   Eyes:      Conjunctiva/sclera: Conjunctivae normal.   Cardiovascular:      Rate and Rhythm: Normal rate and regular rhythm.      Heart sounds: No murmur heard.  Pulmonary:      Effort: Pulmonary effort is normal. No respiratory distress.      Breath sounds: Normal breath sounds.   Abdominal:      Palpations: Abdomen is soft.      Tenderness: There is no abdominal tenderness.   Musculoskeletal:         General: No swelling.      Cervical back: Neck supple.   Skin:     General: Skin is warm and dry.      Capillary Refill: Capillary refill takes less than 2 seconds.   Neurological:      Mental Status: She is alert.   Psychiatric:         Mood and Affect: Mood normal.         Results Reviewed       Procedure Component Value Units Date/Time    HS Troponin 0hr (reflex protocol) [[id number]]  (Normal)  Collected: 02/06/25 1415    Lab Status: Final result Specimen: Blood from Arm, Right Updated: 02/06/25 1450     hs TnI 0hr 4 ng/L     Comprehensive metabolic panel [[id number]] Collected: 02/06/25 1415    Lab Status: Final result Specimen: Blood from Arm, Right Updated: 02/06/25 1444     Sodium 141 mmol/L      Potassium 3.6 mmol/L      Chloride 104 mmol/L      CO2 29 mmol/L      ANION GAP 8 mmol/L      BUN 9 mg/dL      Creatinine 0.99 mg/dL      Glucose 85 mg/dL      Calcium 9.6 mg/dL      AST 15 U/L      ALT 10 U/L      Alkaline Phosphatase 56 U/L      Total Protein 7.6 g/dL      Albumin 4.7 g/dL      Total Bilirubin 0.40 mg/dL      eGFR 60 ml/min/1.73sq m     Narrative:      [employer] guidelines for Chronic Kidney Disease (CKD):     Stage 1 with normal or high GFR (GFR > 90 mL/min/1.73 square meters)    Stage 2 Mild CKD (GFR = 60-89 mL/min/1.73 square meters)    Stage 3A Moderate CKD (GFR = 45-59 mL/min/1.73 square meters)    Stage 3B Moderate CKD (GFR = 30-44 mL/min/1.73 square meters)    Stage 4 Severe CKD (GFR = 15-29 mL/min/1.73 square meters)    Stage 5 End Stage CKD (GFR <15 mL/min/1.73 square meters)  Note: GFR calculation is accurate only with a steady state creatinine    CBC and differential [[id number]]  (Abnormal) Collected: 02/06/25 1415    Lab Status: Final result Specimen: Blood from Arm, Right Updated: 02/06/25 1424     WBC 6.84 Thousand/uL      RBC 4.20 Million/uL      Hemoglobin 12.1 g/dL      Hematocrit 37.1 %      MCV 88 fL      MCH 28.8 pg      MCHC 32.6 g/dL      RDW 15.5 %      MPV 9.8 fL      Platelets 281 Thousands/uL      nRBC 0 /100 WBCs      Segmented % 55 %      Immature Grans % 0 %      Lymphocytes % 38 %      Monocytes % 6 %      Eosinophils Relative 1 %      Basophils Relative 0 %      Absolute Neutrophils 3.72 Thousands/µL      Absolute Immature Grans 0.02 Thousand/uL      Absolute Lymphocytes 2.59 Thousands/µL      Absolute Monocytes 0.44 Thousand/µL       Eosinophils Absolute 0.04 Thousand/µL      Basophils Absolute 0.03 Thousands/µL             XR chest 2 views   Final Interpretation by Sandoval Choi MD (02/06 9927)      No acute cardiopulmonary disease.            Workstation performed: [id number]             ECG 12 Lead Documentation Only    Date/Time: 2/6/2025 2:13 PM    Performed by: Chavez Torrez MD  Authorized by: Chavez Torrez MD    ECG reviewed by me, the ED Provider: yes    Patient location:  ED  Previous ECG:     Previous ECG:  Unavailable  Interpretation:     Interpretation: normal    Rate:     ECG rate assessment: normal    Rhythm:     Rhythm: sinus rhythm    Ectopy:     Ectopy: none    QRS:     QRS axis:  Normal    QRS intervals:  Normal  Conduction:     Conduction: normal    ST segments:     ST segments:  Normal  T waves:     T waves: normal        ED Medication and Procedure Management   Prior to Admission Medications   Prescriptions Last Dose Informant Patient Reported? Taking?   amLODIPine (NORVASC) 5 mg tablet   No No   Sig: Take 1 tablet (5 mg total) by mouth daily   erythromycin (ILOTYCIN) ophthalmic ointment   No No   Sig: Place a 1/2 inch ribbon of ointment into the right lower eyelid.  Apply twice a day for 5 days   hydrOXYzine HCL (ATARAX) 25 mg tablet   No No   Sig: TAKE 1 TABLET (25 MG TOTAL) BY MOUTH EVERY 6 (SIX) HOURS   ibuprofen (MOTRIN) 600 mg tablet   No No   Sig: Take 1 tablet (600 mg total) by mouth every 6 (six) hours as needed for mild pain   rosuvastatin (CRESTOR) 20 MG tablet   No No   Sig: Take 1 tablet (20 mg total) by mouth daily   sertraline (ZOLOFT) 25 mg tablet   No No   Sig: Take 2 tablets (50 mg total) by mouth daily   Patient not taking: Reported on 1/28/2025      Facility-Administered Medications: None     Discharge Medication List as of 2/6/2025  3:16 PM        CONTINUE these medications which have NOT CHANGED    Details   amLODIPine (NORVASC) 5 mg tablet Take 1 tablet (5 mg total) by mouth daily, Starting  Wed 1/29/2025, Normal      erythromycin (ILOTYCIN) ophthalmic ointment Place a 1/2 inch ribbon of ointment into the right lower eyelid.  Apply twice a day for 5 days, Normal      hydrOXYzine HCL (ATARAX) 25 mg tablet TAKE 1 TABLET (25 MG TOTAL) BY MOUTH EVERY 6 (SIX) HOURS, Starting Tue 2/4/2025, Normal      ibuprofen (MOTRIN) 600 mg tablet Take 1 tablet (600 mg total) by mouth every 6 (six) hours as needed for mild pain, Starting Tue 1/28/2025, Normal      rosuvastatin (CRESTOR) 20 MG tablet Take 1 tablet (20 mg total) by mouth daily, Starting Fri 12/13/2024, Normal      sertraline (ZOLOFT) 25 mg tablet Take 2 tablets (50 mg total) by mouth daily, Starting Fri 12/13/2024, Until Wed 6/11/2025, Normal           No discharge procedures on file.  ED SEPSIS DOCUMENTATION   Time reflects when diagnosis was documented in both MDM as applicable and the Disposition within this note       Time User Action Codes Description Comment    2/6/2025  3:15 PM Chavez Torrez Add [R07.9] Chest pain                  Chavez Torrez MD  02/07/25 0690

## 2025-02-28 ENCOUNTER — HOSPITAL ENCOUNTER (OUTPATIENT)
Dept: MAMMOGRAPHY | Facility: CLINIC | Age: 65
End: 2025-02-28
Payer: COMMERCIAL

## 2025-02-28 DIAGNOSIS — Z12.31 ENCOUNTER FOR SCREENING MAMMOGRAM FOR MALIGNANT NEOPLASM OF BREAST: ICD-10-CM

## 2025-02-28 PROCEDURE — 77067 SCR MAMMO BI INCL CAD: CPT

## 2025-02-28 PROCEDURE — 77063 BREAST TOMOSYNTHESIS BI: CPT

## 2025-03-02 DIAGNOSIS — F41.9 ANXIETY: ICD-10-CM

## 2025-03-03 RX ORDER — HYDROXYZINE HYDROCHLORIDE 25 MG/1
25 TABLET, FILM COATED ORAL EVERY 6 HOURS
Qty: 120 TABLET | Refills: 0 | Status: SHIPPED | OUTPATIENT
Start: 2025-03-03

## 2025-03-20 ENCOUNTER — CONSULT (OUTPATIENT)
Dept: CARDIOLOGY CLINIC | Facility: CLINIC | Age: 65
End: 2025-03-20
Payer: COMMERCIAL

## 2025-03-20 VITALS
WEIGHT: 181 LBS | DIASTOLIC BLOOD PRESSURE: 80 MMHG | HEART RATE: 56 BPM | SYSTOLIC BLOOD PRESSURE: 144 MMHG | OXYGEN SATURATION: 94 % | HEIGHT: 63 IN | BODY MASS INDEX: 32.07 KG/M2

## 2025-03-20 DIAGNOSIS — E78.2 MIXED HYPERLIPIDEMIA: Primary | ICD-10-CM

## 2025-03-20 DIAGNOSIS — R07.9 CHEST PAIN, UNSPECIFIED TYPE: ICD-10-CM

## 2025-03-20 PROCEDURE — 99244 OFF/OP CNSLTJ NEW/EST MOD 40: CPT | Performed by: INTERNAL MEDICINE

## 2025-03-20 NOTE — PROGRESS NOTES
Outpatient Consultation - General Cardiology   Latoya Lewis 64 y.o. female   MRN: [id number]  Encounter: [id number]      PCP: Evy Briscoe MD      History of Present Illness   Physician Requesting Consult: Consults   Reason for Consult / Principal Problem: Chest pain    HPI: Latoya Lewis is a 64 y.o. year old female, who was referred to [facility] outpatient [facility]iology by emergency room. She has a history of HTN, COPD, Anxiety/depression, Tobacco use, HLD, Pre-DM, Obesity.  She was evaluated in the ER 2/6/2025 for chest pain and dyspnea.  EKG was noted to be sinus bradycardia with nonspecific T wave abnormality but no ischemic signs.  Troponin was negative and CBC and CMP were normal.  It was determined to be musculoskeletal etiology and she was sent home with follow-up with cardiology.    Today she has no acute complaints. She is unsure why she is here. She has issues with memory and recalling symptoms and events. The  came in later on in thevisit and was able to shed further ligh ton her symptoms.  She has had intermittent chest pain going on for greater than a year at this point.  Her symptoms are mainly when she is active but she does sometimes have them at rest.  There is central pressure-like chest pains.  She has been to the ER multiple times in the last year for the symptoms but has had negative EKGs and troponins.  She is not very active at baseline and does not follow any particular diet.    Family History: no known family history  Tobacco use: used to smoke but stopped, stopped in June. She doesn't remember how long she smoked for  Alcohol use: never drinks  Drug use: none, never in the past  Exercise: does not exercise, used to walk but topped  Diet:  Social: lives in an apparent in [place] with her .    Reviewed Prior Cardiac studies:  Echo 12/31/2024: LVEF 65% with normal wall and normal systolic function, normal size, G1 DD.  No valvular abnormality.    Review of  Systems  Review of system was conducted and was negative except for as stated in the HPI.      Historical Information   Past Medical History:   Diagnosis Date    Anxiety     Chronic pain     back pain     Depression 2024    Diabetes mellitus (HCC)     Disease of thyroid gland 2024    hypothyroid     Hyperlipidemia     Hypertension     Psychiatric disorder 2024     Past Surgical History:   Procedure Laterality Date    DENTAL SURGERY      HYSTERECTOMY      over 20 years ago    DC EXCISION TUMOR SOFT TIS BACK/FLANK SUBQ 3 CM/> Right 2024    Procedure: UPPER BACK EXCISION  BX LESION/MASS;  Surgeon: Jade Farah MD;  Location: [facility];  Service: General     Social History     Substance and Sexual Activity   Alcohol Use Yes    Comment: socially      Social History     Substance and Sexual Activity   Drug Use Never     Social History     Tobacco Use   Smoking Status Former    Current packs/day: 0.00    Average packs/day: 0.5 packs/day for 40.0 years (20.0 ttl pk-yrs)    Types: Cigarettes    Start date: 1983    Quit date: 2023    Years since quittin.8    Passive exposure: Past   Smokeless Tobacco Never     Family History: Family history non-contributory    Meds/Allergies   Home Medications:   Current Outpatient Medications:     amLODIPine (NORVASC) 5 mg tablet, Take 1 tablet (5 mg total) by mouth daily, Disp: 90 tablet, Rfl: 1    erythromycin (ILOTYCIN) ophthalmic ointment, Place a 1/2 inch ribbon of ointment into the right lower eyelid.  Apply twice a day for 5 days, Disp: 3.5 g, Rfl: 0    hydrOXYzine HCL (ATARAX) 25 mg tablet, TAKE 1 TABLET (25 MG TOTAL) BY MOUTH EVERY 6 (SIX) HOURS, Disp: 120 tablet, Rfl: 0    ibuprofen (MOTRIN) 600 mg tablet, Take 1 tablet (600 mg total) by mouth every 6 (six) hours as needed for mild pain, Disp: 30 tablet, Rfl: 1    rosuvastatin (CRESTOR) 20 MG tablet, Take 1 tablet (20 mg total) by mouth daily, Disp: 90 tablet, Rfl: 1    sertraline (ZOLOFT) 25  "mg tablet, Take 2 tablets (50 mg total) by mouth daily, Disp: 60 tablet, Rfl: 5    Allergies   Allergen Reactions    Aspirin      Patient does not know    Duloxetine Other (See Comments)         Objective   Vitals: Blood pressure 144/80, pulse 56, height 5' 3\" (1.6 m), weight 82.1 kg (181 lb), SpO2 94%, not currently breastfeeding.      Physical Exam  GEN: Latoya Lewis appears well, alert and oriented x 3, pleasant and cooperative   HEENT:  Normocephalic, atraumatic, anicteric, moist mucous membranes  NECK: No JVD or carotid bruits   HEART: Regular rhythm, regular rate, normal S1 and S2, no murmurs, clicks, gallops or rubs   LUNGS: Clear to auscultation bilaterally; no wheezes, rales, or rhonchi; respiration nonlabored   ABDOMEN:  Normoactive bowel sounds, soft, no tenderness, no distention  EXTREMITIES: peripheral pulses palpable; no edema  NEURO: no gross focal findings; cranial nerves grossly intact   SKIN:  Dry, intact, warm to touch    Lab Results: I have personally reviewed pertinent lab results.    Lab Results   Component Value Date    HSTNI0 4 02/06/2025    HSTNI2 <2 11/07/2024     Lab Results   Component Value Date    NTBNP 60.9 04/28/2022     Lab Results   Component Value Date    TRIG 114 10/07/2024    HDL 51 10/07/2024    LDLCALC 161 (H) 10/07/2024     Lab Results   Component Value Date    K 3.6 02/06/2025    CO2 29 02/06/2025     02/06/2025    BUN 9 02/06/2025    CREATININE 0.99 02/06/2025    ALT 10 02/06/2025    AST 15 02/06/2025     Lab Results   Component Value Date    WBC 6.84 02/06/2025    HGB 12.1 02/06/2025    HCT 37.1 02/06/2025    MCV 88 02/06/2025     02/06/2025     No results found for: \"INR\"  Lab Results   Component Value Date    HGBA1C 5.7 (H) 10/07/2024        Imaging: Results Review Statement: I reviewed radiology reports from this admission including: Echocardiogram.      ECHO:  Results for orders placed during the hospital encounter of 09/20/18    Echo complete with " contrast if indicated    Narrative  [address] [address]    Transthoracic Echocardiogram  2D, M-mode, Doppler, and Color Doppler    Study date:  20-Sep-2018    Patient: LUKAS DE LEÓN  MR number: [id number]  Account number: [id number]  : 1960  Age: 58 years  Gender: Female  Status: Outpatient  Location: [facility]  Height: 64 in  Weight: 162 lb  BP: 147/ 92 mmHg    Indications: Syncope.    Diagnoses: R55. - Syncope and collapse    Sonographer:  Sayra Chatterjee CCT,RCS  Primary Physician:  Camilla Khalil MD  Group:  [facility]  Interpreting Physician:  Maxine Hall MD    IMPRESSIONS:  Mild concentric left ventricular hypertrophy, normal left ventricular systolic and diastolic function. EF approximately 60-65%.  No other significant chamber hypertrophy or enlargement.  Mild aortic valve sclerosis, no stenosis or regurgitation.  Mild mitral valve sclerosis, trace mitral valve regurgitation.  Trace tricuspid valve regurgitation.  Estimated RVSP/PASP borderline elevated. Possible early mild pulmonary hypertension.  No pericardial effusion.    No previous echocardiogram is available for comparison.    SUMMARY    LEFT VENTRICLE:  Normal left ventricular cavity size, mild concentric left ventricular hypertrophy, normal left ventricular systolic function and normal wall motion. Ejection fraction is estimated as around 60-65%. Normal diastolic function.    RIGHT VENTRICLE:  Normal right ventricular size and systolic function. Estimated right ventricular systolic pressure is borderline elevated, 37 mmHg. Possible mild pulmonary hypertension.    LEFT ATRIUM:  Normal left atrial cavity size. Intact interatrial septum.    RIGHT ATRIUM:  Normal right atrial cavity size.    MITRAL VALVE:  Mild anterior mitral valve leaflet sclerosis. Trace mitral valve regurgitation.    AORTIC VALVE:  Tricuspid aortic valve with mild  sclerosis. No aortic valve stenosis or regurgitation.    TRICUSPID VALVE:  Trace tricuspid valve regurgitation.    PULMONIC VALVE:  No significant pulmonic valve regurgitation.    AORTA:  Aortic root and proximal ascending aorta are normal in size on 2D imaging.    IVC, HEPATIC VEINS:  Inferior vena cava is normal in size and demonstrates appropriate respiratory phasic changes in diameter.    PERICARDIUM:  No pericardial effusion.    HISTORY: PRIOR HISTORY: Hypotension, hypertension, diabetes, anxiety, smoker.    PROCEDURE: The study was performed in the [facility]. This was a routine study. The transthoracic approach was used. The study included complete 2D imaging, M-mode, complete spectral Doppler, and color Doppler. The heart rate was  57 bpm, at the start of the study. Images were obtained from the parasternal, apical, subcostal, and suprasternal notch acoustic windows. Image quality was adequate.    LEFT VENTRICLE: Normal left ventricular cavity size, mild concentric left ventricular hypertrophy, normal left ventricular systolic function and normal wall motion. Ejection fraction is estimated as around 60-65%. Normal diastolic  function.    RIGHT VENTRICLE: Normal right ventricular size and systolic function. Estimated right ventricular systolic pressure is borderline elevated, 37 mmHg. Possible mild pulmonary hypertension.    LEFT ATRIUM: Normal left atrial cavity size. Intact interatrial septum.    RIGHT ATRIUM: Normal right atrial cavity size.    MITRAL VALVE: Mild anterior mitral valve leaflet sclerosis. Trace mitral valve regurgitation.    AORTIC VALVE: Tricuspid aortic valve with mild sclerosis. No aortic valve stenosis or regurgitation.    TRICUSPID VALVE: Trace tricuspid valve regurgitation.    PULMONIC VALVE: No significant pulmonic valve regurgitation.    PERICARDIUM: No pericardial effusion.    AORTA: Aortic root and proximal ascending aorta are normal in size on 2D imaging.    SYSTEMIC  VEINS: IVC: Inferior vena cava is normal in size and demonstrates appropriate respiratory phasic changes in diameter.    SYSTEM MEASUREMENT TABLES    2D  %FS: 37.85 %  Ao Diam: 2.74 cm  EDV(Teich): 89.94 ml  EF(Teich): 68.17 %  ESV(Teich): 28.63 ml  IVSd: 1.16 cm  LA Area: 13.63 cm2  LA Diam: 3.29 cm  LVEDV MOD A4C: 66.89 ml  LVEF MOD A4C: 68.79 %  LVESV MOD A4C: 20.88 ml  LVIDd: 4.45 cm  LVIDs: 2.76 cm  LVLd A4C: 7.77 cm  LVLs A4C: 5.83 cm  LVPWd: 1.18 cm  RA Area: 11.43 cm2  RVIDd: 3.15 cm  SV MOD A4C: 46.01 ml  SV(Teich): 61.31 ml    CW  AV Vmax: 1.19 m/s  AV maxP.62 mmHg  RAP: 10 mmHg  TR Vmax: 2.59 m/s  TR maxP.76 mmHg    MM  TAPSE: 2.33 cm    PW  E': 0.09 m/s  E/E': 9.61  LATERAL E': 0.09 m/s  MV A Marck: 0.88 m/s  MV Dec [place]: 4.97 m/s2  MV DecT: 171.58 ms  MV E Marck: 0.85 m/s  MV E/A Ratio: 0.97  MV PHT: 49.76 ms  MVA By PHT: 4.42 cm2  RVSP: 36.76 mmHg    [facility]    Prepared and electronically signed by    Maxine Hall MD  Signed 20-Sep-2018 13:15:05    Results for orders placed during the hospital encounter of 24    Echo complete w/ contrast if indicated    Interpretation Summary    Left Ventricle: Left ventricular cavity size is normal. Wall thickness is normal. The left ventricular ejection fraction is 65%. Systolic function is normal. Wall motion is normal. Diastolic function is mildly abnormal, consistent with grade I (abnormal) relaxation.    Right Ventricle: Right ventricular cavity size is normal. Systolic function is normal.      MEÑO:  No results found for this or any previous visit.    No results found for this or any previous visit.      CMR:  No results found for this or any previous visit.    No results found for this or any previous visit.    No results found for this or any previous visit.      HOLTER  No results found for this or any previous visit.    No results found for this or any previous visit.      The 10-year ASCVD  risk score (Lazaro ACOSTA, et al., 2019) is: 29.4%    Values used to calculate the score:      Age: 64 years      Sex: Female      Is Non- : Yes      Diabetic: Yes      Tobacco smoker: No      Systolic Blood Pressure: 144 mmHg      Is BP treated: Yes      HDL Cholesterol: 51 mg/dL      Total Cholesterol: 235 mg/dL      Assessment & Plan     Assessment/Plan:    Chest pain  Chest pain appears to be consistent with angina however she has trouble describing her symptoms.  Had multiple ECGs and troponins in the past on ER visit that all were negative.  Echocardiogram reviewed from 12/21/2024 with normal function and no wall motion abnormalities.  Is not very active and has poorly controlled cholesterol.  Smoked in the past but quit in June..  - Will check exercise ECG stress test  - Once complete stress test recommend increasing exercise regimen  -Needs improved cholesterol control, see below    HLD  ASCVD risk is elevated at 29.4.  She is on Crestor 20 mg daily that has been increased from 10 mg due to consistently poorly controlled cholesterol.  I discussed the need for increased dose of statin in the office today however  is concerned that she has had increased ringing motion in her hands since increasing the dose of Crestor which he thinks may be related to the increase in this medication.  I offered switching to atorvastatin however he would prefer to see how her cholesterol levels with diet and lifestyle modification along with the Crestor 20 mg daily.  - Continue Crestor 20 mg daily  - Check lipid panel in 3 months and if not at goal (at least 50% reduction i.e. less than 80) then would either increase Crestor or switch to atorvastatin 80 mg daily    HTN  Blood pressure well controlled  -continue amlodipine 5 mg    Anxiety/depression  On sertraline 25 mg daily    Tobacco use  Quit in June but does have significant tobacco use history, she is unsure how long she has been  smoking.    Pre-DM  Obesity-increase exercise regimen as well as recommended Mediterranean diet      Case discussed and reviewed with Dr. Cole who agrees with my assessment and plan.    Thank you for involving us in the care of your patient.      Sinan Martinez,   Cardiology Fellow   PGY-5      ==========================================================================================    Epic/ Allscripts/Care Everywhere records reviewed    ** Please Note: Fluency DirectDictation voice to text software may have been used in the creation of this document. **

## 2025-04-02 DIAGNOSIS — F41.9 ANXIETY: ICD-10-CM

## 2025-04-02 RX ORDER — HYDROXYZINE HYDROCHLORIDE 25 MG/1
25 TABLET, FILM COATED ORAL EVERY 6 HOURS
Qty: 120 TABLET | Refills: 0 | Status: SHIPPED | OUTPATIENT
Start: 2025-04-02

## 2025-04-07 ENCOUNTER — HOSPITAL ENCOUNTER (EMERGENCY)
Facility: HOSPITAL | Age: 65
Discharge: HOME | End: 2025-04-07
Attending: STUDENT IN AN ORGANIZED HEALTH CARE EDUCATION/TRAINING PROGRAM
Payer: COMMERCIAL

## 2025-04-07 ENCOUNTER — APPOINTMENT (EMERGENCY)
Dept: RADIOLOGY | Facility: HOSPITAL | Age: 65
End: 2025-04-07
Payer: COMMERCIAL

## 2025-04-07 VITALS
RESPIRATION RATE: 22 BRPM | TEMPERATURE: 98.4 F | DIASTOLIC BLOOD PRESSURE: 60 MMHG | HEART RATE: 71 BPM | OXYGEN SATURATION: 93 % | BODY MASS INDEX: 32.18 KG/M2 | WEIGHT: 181.66 LBS | SYSTOLIC BLOOD PRESSURE: 113 MMHG

## 2025-04-07 DIAGNOSIS — M54.30 SCIATICA: Primary | ICD-10-CM

## 2025-04-07 PROCEDURE — 72100 X-RAY EXAM L-S SPINE 2/3 VWS: CPT

## 2025-04-07 PROCEDURE — 99283 EMERGENCY DEPT VISIT LOW MDM: CPT

## 2025-04-07 PROCEDURE — 99284 EMERGENCY DEPT VISIT MOD MDM: CPT

## 2025-04-07 RX ORDER — ACETAMINOPHEN 325 MG/1
650 TABLET ORAL ONCE
Status: COMPLETED | OUTPATIENT
Start: 2025-04-07 | End: 2025-04-07

## 2025-04-07 RX ORDER — GABAPENTIN 100 MG/1
100 CAPSULE ORAL ONCE
Status: COMPLETED | OUTPATIENT
Start: 2025-04-07 | End: 2025-04-07

## 2025-04-07 RX ORDER — GABAPENTIN 100 MG/1
100 CAPSULE ORAL 3 TIMES DAILY
Qty: 90 CAPSULE | Refills: 0 | Status: SHIPPED | OUTPATIENT
Start: 2025-04-07

## 2025-04-07 RX ORDER — LIDOCAINE 50 MG/G
1 PATCH TOPICAL ONCE
Status: DISCONTINUED | OUTPATIENT
Start: 2025-04-07 | End: 2025-04-07 | Stop reason: HOSPADM

## 2025-04-07 RX ADMIN — GABAPENTIN 100 MG: 100 CAPSULE ORAL at 13:53

## 2025-04-07 RX ADMIN — LIDOCAINE 5% 1 PATCH: 700 PATCH TOPICAL at 13:53

## 2025-04-07 RX ADMIN — ACETAMINOPHEN 650 MG: 325 TABLET, FILM COATED ORAL at 13:53

## 2025-04-07 NOTE — Clinical Note
Latoya Lewis was seen and treated in our emergency department on 4/7/2025.    No restrictions            Diagnosis:     Latoya  .    She may return on this date: 04/08/2025         If you have any questions or concerns, please don't hesitate to call.      Adryan Ahumada PA-C    ______________________________           _______________          _______________  Hospital Representative                              Date                                Time

## 2025-04-07 NOTE — ED PROVIDER NOTES
Time reflects when diagnosis was documented in both MDM as applicable and the Disposition within this note       Time User Action Codes Description Comment    4/7/2025  2:29 PM Adryan Ahumada Add [M54.30] Sciatica           ED Disposition       ED Disposition   Discharge    Condition   Stable    Date/Time   Mon Apr 7, 2025  2:29 PM    Comment   Latoya Lewis discharge to home/self care.                   Assessment & Plan       Medical Decision Making  Patient is a 64-year-old female coming in for evaluation of atraumatic back pain.  No concern for cauda equina at this time.  X-ray as read by me shows no sign of acute osseous abnormalities.  Patient given supportive recommendations, will start on gabapentin, and discharged home.  Did refer patient to comprehensive spine as well for further evaluation and treatment.    Amount and/or Complexity of Data Reviewed  Radiology: ordered and independent interpretation performed.    Risk  OTC drugs.  Prescription drug management.             Medications   gabapentin (NEURONTIN) capsule 100 mg (100 mg Oral Given 4/7/25 1353)   acetaminophen (TYLENOL) tablet 650 mg (650 mg Oral Given 4/7/25 1353)       ED Risk Strat Scores                            SBIRT 20yo+      Flowsheet Row Most Recent Value   VANIA: How many times in the past year have you...    Used an illegal drug or used a prescription medication for non-medical reasons? Never Filed at: 04/07/2025 1334                            History of Present Illness       Chief Complaint   Patient presents with    Back Pain     Pt concerned 1 day of lower back pain that travels down left leg. No meds PTA.        Past Medical History:   Diagnosis Date    Anxiety     Chronic pain     back pain     Depression 1/23/2024    Diabetes mellitus (HCC)     Disease of thyroid gland 1/23/2024    hypothyroid     Hyperlipidemia     Hypertension     Psychiatric disorder 1/23/2024      Past Surgical History:   Procedure Laterality Date     DENTAL SURGERY      HYSTERECTOMY      over 20 years ago    FL EXCISION TUMOR SOFT TIS BACK/FLANK SUBQ 3 CM/> Right 2024    Procedure: UPPER BACK EXCISION  BX LESION/MASS;  Surgeon: Jade Farah MD;  Location: [facility];  Service: General      Family History   Problem Relation Age of Onset    Dementia Mother     Cancer Maternal Aunt     Cancer Maternal Uncle     Breast cancer Neg Hx       Social History     Tobacco Use    Smoking status: Former     Current packs/day: 0.00     Average packs/day: 0.5 packs/day for 40.0 years (20.0 ttl pk-yrs)     Types: Cigarettes     Start date: 1983     Quit date: 2023     Years since quittin.9     Passive exposure: Past    Smokeless tobacco: Never   Vaping Use    Vaping status: Never Used   Substance Use Topics    Alcohol use: Yes     Comment: socially     Drug use: Never      E-Cigarette/Vaping    E-Cigarette Use Never User       E-Cigarette/Vaping Substances    Nicotine No     THC No     CBD No     Flavoring No     Other No     Unknown No       I have reviewed and agree with the history as documented.     Patient is a 64-year-old female coming in for evaluation of atraumatic back pain.  Reports that she is having radiation down her left leg.  Denies any bladder or bowel incontinence, no saddle anesthesia.  Of note, patient does have a history of dementia, and is presenting with her  at this time      Back Pain  Associated symptoms: no abdominal pain and no dysuria        Review of Systems   Gastrointestinal:  Negative for abdominal pain, nausea and vomiting.   Genitourinary:  Negative for difficulty urinating and dysuria.   Musculoskeletal:  Positive for back pain.           Objective       ED Triage Vitals   Temperature Pulse Blood Pressure Respirations SpO2 Patient Position - Orthostatic VS   25 1334 25 1334 25 1334 25 1334 25 1334 25 1334   98.4 °F (36.9 °C) 71 113/60 22 93 % Lying      Temp Source Heart Rate  Source BP Location FiO2 (%) Pain Score    04/07/25 1334 04/07/25 1334 04/07/25 1334 -- 04/07/25 1353    Oral Monitor Left arm  10 - Worst Possible Pain      Vitals      Date and Time Temp Pulse SpO2 Resp BP Pain Score FACES Pain Rating User   04/07/25 1353 -- -- -- -- -- 10 - Worst Possible Pain -- MLR   04/07/25 1334 98.4 °F (36.9 °C) 71 93 % 22 113/60 -- --             Physical Exam  Vitals reviewed.   Constitutional:       Appearance: Normal appearance. She is normal weight.   HENT:      Head: Normocephalic and atraumatic.      Right Ear: External ear normal.      Left Ear: External ear normal.      Nose: Nose normal.   Eyes:      Conjunctiva/sclera: Conjunctivae normal.   Cardiovascular:      Rate and Rhythm: Normal rate.   Pulmonary:      Effort: Pulmonary effort is normal.   Abdominal:      Tenderness: There is no right CVA tenderness or left CVA tenderness.   Musculoskeletal:         General: Tenderness present. Normal range of motion.      Cervical back: Normal range of motion.      Comments: Tenderness to palpation of the left glute, as well as the left lumbar paraspinal muscles, and a little bit of the midline at the lumbar spine.  No sensation.   Skin:     General: Skin is warm and dry.   Neurological:      Mental Status: She is alert.         Results Reviewed       None            XR spine lumbar 2 or 3 views injury   ED Interpretation by Adryan Ahumada PA-C (04/07 1427)   No obvious osseus abnormalities        Final Interpretation by Bradley Landon Kocher, MD (04/07 1446)      No acute osseous abnormality.      Degenerative changes as described.         Computerized Assisted Algorithm (CAA) may have been used to analyze all applicable images.         Workstation performed: [id number]             Procedures    ED Medication and Procedure Management   Prior to Admission Medications   Prescriptions Last Dose Informant Patient Reported? Taking?   amLODIPine (NORVASC) 5 mg tablet  Self No No   Sig:  Take 1 tablet (5 mg total) by mouth daily   erythromycin (ILOTYCIN) ophthalmic ointment  Self No No   Sig: Place a 1/2 inch ribbon of ointment into the right lower eyelid.  Apply twice a day for 5 days   hydrOXYzine HCL (ATARAX) 25 mg tablet   No No   Sig: TAKE 1 TABLET (25 MG TOTAL) BY MOUTH EVERY 6 (SIX) HOURS   ibuprofen (MOTRIN) 600 mg tablet  Self No No   Sig: Take 1 tablet (600 mg total) by mouth every 6 (six) hours as needed for mild pain   rosuvastatin (CRESTOR) 20 MG tablet  Self No No   Sig: Take 1 tablet (20 mg total) by mouth daily   sertraline (ZOLOFT) 25 mg tablet  Self No No   Sig: Take 2 tablets (50 mg total) by mouth daily      Facility-Administered Medications: None     Discharge Medication List as of 4/7/2025  2:29 PM        START taking these medications    Details   gabapentin (Neurontin) 100 mg capsule Take 1 capsule (100 mg total) by mouth 3 (three) times a day, Starting Mon 4/7/2025, Normal           CONTINUE these medications which have NOT CHANGED    Details   amLODIPine (NORVASC) 5 mg tablet Take 1 tablet (5 mg total) by mouth daily, Starting Wed 1/29/2025, Normal      erythromycin (ILOTYCIN) ophthalmic ointment Place a 1/2 inch ribbon of ointment into the right lower eyelid.  Apply twice a day for 5 days, Normal      hydrOXYzine HCL (ATARAX) 25 mg tablet TAKE 1 TABLET (25 MG TOTAL) BY MOUTH EVERY 6 (SIX) HOURS, Starting Wed 4/2/2025, Normal      ibuprofen (MOTRIN) 600 mg tablet Take 1 tablet (600 mg total) by mouth every 6 (six) hours as needed for mild pain, Starting Tue 1/28/2025, Normal      rosuvastatin (CRESTOR) 20 MG tablet Take 1 tablet (20 mg total) by mouth daily, Starting Fri 12/13/2024, Normal      sertraline (ZOLOFT) 25 mg tablet Take 2 tablets (50 mg total) by mouth daily, Starting Fri 12/13/2024, Until Wed 6/11/2025, Normal             ED SEPSIS DOCUMENTATION   Time reflects when diagnosis was documented in both MDM as applicable and the Disposition within this note        Time User Action Codes Description Comment    4/7/2025  2:29 PM Adryan Ahumada Add [M54.30] Sciatica                  Adryan Ahumada PA-C  04/07/25 1746

## 2025-04-08 ENCOUNTER — TELEPHONE (OUTPATIENT)
Dept: PHYSICAL THERAPY | Facility: OTHER | Age: 65
End: 2025-04-08

## 2025-04-08 ENCOUNTER — NURSE TRIAGE (OUTPATIENT)
Dept: PHYSICAL THERAPY | Facility: OTHER | Age: 65
End: 2025-04-08

## 2025-04-08 DIAGNOSIS — M54.50 ACUTE EXACERBATION OF CHRONIC LOW BACK PAIN: Primary | ICD-10-CM

## 2025-04-08 DIAGNOSIS — G89.29 ACUTE EXACERBATION OF CHRONIC LOW BACK PAIN: Primary | ICD-10-CM
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
## 2025-04-08 NOTE — TELEPHONE ENCOUNTER
"Additional Information   Negative: Has the patient had unexplained weight loss?   Negative: Does the patient have a fever?   Negative: Is the patient experiencing urine retention?   Negative: Is the patient experiencing acute drop foot or paralysis?   Negative: Has the patient experienced major trauma? (fall from height, high speed collision, direct blow to spine) and is also experiencing nausea, light-headedness, or loss of consciousness?   Negative: Is the patient experiencing blood in sputum?   Affirmative: Is this a chronic condition?    Protocols used: [facility] Protocol    Patient states the low back pain has been present for \"many years but the leg pain is new.\"    [facility] was reviewed in detail and what we can provide for their back pain.  Patient is agreeable to being triaged and would like to proceed with Physical Therapy.    Referral was placed for Physical Therapy at the [place] site. Patients information was sent to the  to make evaluation appointment. Patient made aware that the PT office  will be calling to schedule the appointment.  Patient was provided with the phone number to the PT office.    No further questions and/or concerns were voiced by the patient at this time. Patient states understanding of the referral that was placed.    Referral Closed.    "

## 2025-04-28 DIAGNOSIS — F41.9 ANXIETY: ICD-10-CM

## 2025-04-28 RX ORDER — HYDROXYZINE HYDROCHLORIDE 25 MG/1
25 TABLET, FILM COATED ORAL EVERY 6 HOURS
Qty: 120 TABLET | Refills: 0 | Status: SHIPPED | OUTPATIENT
Start: 2025-04-28

## 2025-04-29 ENCOUNTER — TELEPHONE (OUTPATIENT)
Dept: FAMILY MEDICINE CLINIC | Facility: CLINIC | Age: 65
End: 2025-04-29

## 2025-04-29 NOTE — TELEPHONE ENCOUNTER
first attempt to contact patient.Unable to leave message phone says please enter your remote access code patient needs to reschedule missed appointment for a physical with pcp.

## 2025-05-06 ENCOUNTER — OFFICE VISIT (OUTPATIENT)
Dept: FAMILY MEDICINE CLINIC | Facility: CLINIC | Age: 65
End: 2025-05-06

## 2025-05-06 VITALS
HEIGHT: 63 IN | OXYGEN SATURATION: 95 % | DIASTOLIC BLOOD PRESSURE: 80 MMHG | WEIGHT: 178 LBS | HEART RATE: 62 BPM | RESPIRATION RATE: 16 BRPM | SYSTOLIC BLOOD PRESSURE: 120 MMHG | TEMPERATURE: 98 F | BODY MASS INDEX: 31.54 KG/M2

## 2025-05-06 DIAGNOSIS — Z00.00 ANNUAL PHYSICAL EXAM: Primary | ICD-10-CM

## 2025-05-06 DIAGNOSIS — G89.29 CHRONIC MIDLINE THORACIC BACK PAIN: ICD-10-CM

## 2025-05-06 DIAGNOSIS — H61.23 EXCESSIVE CERUMEN IN EAR CANAL, BILATERAL: ICD-10-CM

## 2025-05-06 DIAGNOSIS — I10 HYPERTENSION: ICD-10-CM

## 2025-05-06 DIAGNOSIS — E78.2 MIXED HYPERLIPIDEMIA: ICD-10-CM

## 2025-05-06 DIAGNOSIS — M54.6 CHRONIC MIDLINE THORACIC BACK PAIN: ICD-10-CM

## 2025-05-06 RX ORDER — AMLODIPINE BESYLATE 5 MG/1
5 TABLET ORAL DAILY
Qty: 90 TABLET | Refills: 1 | Status: SHIPPED | OUTPATIENT
Start: 2025-05-06

## 2025-05-06 NOTE — ASSESSMENT & PLAN NOTE
-History of fall 2010, preceded first back pain symptom  -Recurrence of generalized paraspinal back pain  -Pain worse on the upper right back  -Not associated with red flag signs  -Previously resolved with 600 mg ibuprofen, according to the , however stopped taking it about a month ago.    Plan  -Refer to PT  -Restart motrin 600 mg  Orders:    Ambulatory Referral to Physical Therapy; Future

## 2025-05-06 NOTE — PROGRESS NOTES
Adult Annual Physical  Name: Latoya Lewis      : 1960      MRN: [id number]  Encounter Provider: Evy Briscoe MD  Encounter Date: 2025   Encounter department: [facility]    :  Assessment & Plan  Annual physical exam  Vaccines declined today  Up to date with screening.     Plan  -Lipid panel  -Follow up next year for annual physical on     Orders:    Lipid Panel with Direct LDL reflex; Future    Hypertension  Well controlled  Home meds lisinopril  Requesting refills    Orders:    amLODIPine (NORVASC) 5 mg tablet; Take 1 tablet (5 mg total) by mouth daily    Chronic midline thoracic back pain  -History of 2010, preceded first back pain symptom  -Recurrence of generalized paraspinal back pain  -Pain worse on the upper right back  -Not associated with red flag signs  -Previously resolved with 600 mg ibuprofen, according to the , however stopped taking it about a month ago.    Plan  -Refer to PT  -Restart motrin 600 mg  Orders:    Ambulatory Referral to Physical Therapy; Future    Mixed hyperlipidemia  ASCVD Risk: 17.1%  Home meds: rosuvastatin 20 mg     Plan  Cont home meds  Lipid panel  Orders:    Lipid Panel with Direct LDL reflex; Future    Excessive cerumen in ear canal, bilateral  On ear examination , bilateral cerumen impaction  Bilateral tinnitus, not sure how long ago  Denies any imbalance or vertigo    Plan  -Debrox ear solution  -To follow up in a week to reassess   Orders:    carbamide peroxide (DEBROX) 6.5 % otic solution; Administer 5 drops into both ears 2 (two) times a day      Preventive Screenings:  - Diabetes Screening: screening not indicated  - Cholesterol Screening: screening not indicated and has hyperlipidemia   - Hepatitis C screening: screening up-to-date   - HIV screening: screening up-to-date   - Breast cancer screening: screening up-to-date   - Colon cancer screening: screening up-to-date   - Lung cancer screening: screening  up-to-date     Immunizations:  - Immunizations due: Prevnar 20, Tdap and Zoster (Shingrix)    BMI Counseling: Body mass index is 31.53 kg/m². The BMI is above normal. Nutrition recommendations include decreasing portion sizes, decreasing fast food intake, consuming healthier snacks, limiting drinks that contain sugar and reducing intake of cholesterol. Exercise recommendations include moderate physical activity 150 minutes/week. Rationale for BMI follow-up plan is due to patient being overweight or obese.       History of Present Illness     Adult Annual Physical:  Patient presents for annual physical.     Diet and Physical Activity:  - Diet/Nutrition: no special diet and frequent junk food.  - Exercise: no formal exercise.    Depression Screening:  - PHQ-2 Score: 2  - PHQ-9 Score: 5    General Health:  - Sleep: 4-6 hours of sleep on average, snores loudly and sleeps poorly. 6 hrs average, lots of pacing in the evening  - Hearing: normal hearing bilateral ears.  - Vision: most recent eye exam < 1 year ago and no vision problems.  - Dental: brushes teeth once daily and does not floss. last dentist visit 6 months ago    /GYN Health:  - Follows with GYN: no.   - Menopause: postmenopausal.   - Contraception: menopause.      Advanced Care Planning:  - Has an advanced directive?: no    - Has a durable medical POA?: no      Review of Systems   Constitutional:  Negative for chills and fever.   HENT:  Negative for ear pain and sore throat.    Eyes:  Negative for pain and visual disturbance.   Respiratory:  Negative for cough and shortness of breath.    Cardiovascular:  Negative for chest pain and palpitations.   Gastrointestinal:  Negative for abdominal pain, constipation, diarrhea and vomiting.   Genitourinary:  Negative for dysuria and hematuria.   Musculoskeletal:  Positive for back pain. Negative for arthralgias and neck pain.   Skin:  Negative for color change and rash.   Neurological:  Negative for seizures and  "syncope.   All other systems reviewed and are negative.        Objective   /80 (BP Location: Left arm, Patient Position: Sitting, Cuff Size: Standard)   Pulse 62   Temp 98 °F (36.7 °C) (Temporal)   Resp 16   Ht 5' 3\" (1.6 m)   Wt 80.7 kg (178 lb)   SpO2 95%   BMI 31.53 kg/m²     Physical Exam  Vitals reviewed.   HENT:      Right Ear: Hearing and external ear normal. No decreased hearing noted. No drainage or tenderness. There is impacted cerumen.      Left Ear: Hearing and external ear normal. No decreased hearing noted. No drainage or tenderness. There is impacted cerumen.      Ears:      Comments: Couldn't appreciate tympanic membrane clearly on both ears, due to cerumen impaction.     Nose: No congestion or rhinorrhea.      Mouth/Throat:      Mouth: Mucous membranes are moist.   Eyes:      Extraocular Movements: Extraocular movements intact.      Conjunctiva/sclera: Conjunctivae normal.   Cardiovascular:      Rate and Rhythm: Normal rate and regular rhythm.      Pulses: Normal pulses.      Heart sounds: Normal heart sounds. No murmur heard.  Pulmonary:      Effort: Pulmonary effort is normal. No respiratory distress.      Breath sounds: Normal breath sounds.   Abdominal:      General: Abdomen is flat. Bowel sounds are normal. There is no distension.      Palpations: Abdomen is soft. There is no mass.      Tenderness: There is no abdominal tenderness. There is no right CVA tenderness, left CVA tenderness, guarding or rebound.   Musculoskeletal:         General: Normal range of motion.      Cervical back: Normal range of motion and neck supple.   Skin:     General: Skin is warm.   Neurological:      Mental Status: She is alert and oriented to person, place, and time. Mental status is at baseline.      Cranial Nerves: No cranial nerve deficit.      Sensory: No sensory deficit.      Motor: No weakness.      Gait: Gait normal.   Psychiatric:         Mood and Affect: Mood normal.         "

## 2025-05-06 NOTE — PATIENT INSTRUCTIONS
"Patient Education     Routine physical for adults   The Basics   Written by the doctors and editors at [facility]   What is a physical? -- A physical is a routine visit, or \"check-up,\" with your doctor. You might also hear it called a \"wellness visit\" or \"preventive visit.\"  During each visit, the doctor will:   Ask about your physical and mental health   Ask about your habits, behaviors, and lifestyle   Do an exam   Give you vaccines if needed   Talk to you about any medicines you take   Give advice about your health   Answer your questions  Getting regular check-ups is an important part of taking care of your health. It can help your doctor find and treat any problems you have. But it's also important for preventing health problems.  A routine physical is different from a \"sick visit.\" A sick visit is when you see a doctor because of a health concern or problem. Since physicals are scheduled ahead of time, you can think about what you want to ask the doctor.  How often should I get a physical? -- It depends on your age and health. In general, for people age 21 years and older:   If you are younger than 50 years, you might be able to get a physical every 3 years.   If you are 50 years or older, your doctor might recommend a physical every year.  If you have an ongoing health condition, like diabetes or high blood pressure, your doctor will probably want to see you more often.  What happens during a physical? -- In general, each visit will include:   Physical exam - The doctor or nurse will check your height, weight, heart rate, and blood pressure. They will also look at your eyes and ears. They will ask about how you are feeling and whether you have any symptoms that bother you.   Medicines - It's a good idea to bring a list of all the medicines you take to each doctor visit. Your doctor will talk to you about your medicines and answer any questions. Tell them if you are having any side effects that bother you. You " "should also tell them if you are having trouble paying for any of your medicines.   Habits and behaviors - This includes:   Your diet   Your exercise habits   Whether you smoke, drink alcohol, or use drugs   Whether you are sexually active   Whether you feel safe at home  Your doctor will talk to you about things you can do to improve your health and lower your risk of health problems. They will also offer help and support. For example, if you want to quit smoking, they can give you advice and might prescribe medicines. If you want to improve your diet or get more physical activity, they can help you with this, too.   Lab tests, if needed - The tests you get will depend on your age and situation. For example, your doctor might want to check your:   Cholesterol   Blood sugar   Iron level   Vaccines - The recommended vaccines will depend on your age, health, and what vaccines you already had. Vaccines are very important because they can prevent certain serious or deadly infections.   Discussion of screening - \"Screening\" means checking for diseases or other health problems before they cause symptoms. Your doctor can recommend screening based on your age, risk, and preferences. This might include tests to check for:   Cancer, such as breast, prostate, cervical, ovarian, colorectal, prostate, lung, or skin cancer   Sexually transmitted infections, such as chlamydia and gonorrhea   Mental health conditions like depression and anxiety  Your doctor will talk to you about the different types of screening tests. They can help you decide which screenings to have. They can also explain what the results might mean.   Answering questions - The physical is a good time to ask the doctor or nurse questions about your health. If needed, they can refer you to other doctors or specialists, too.  Adults older than 65 years often need other care, too. As you get older, your doctor will talk to you about:   How to prevent falling at " home   Hearing or vision tests   Memory testing   How to take your medicines safely   Making sure that you have the help and support you need at home  All topics are updated as new evidence becomes available and our peer review process is complete.  This topic retrieved from [employer] on: May 02, 2024.  Topic 031903 Version 1.0  Release: 32.4.3 - C32.122  © 2024 UpToDate, Inc. and/or its affiliates. All rights reserved.  Consumer Information Use and Disclaimer   Disclaimer: This generalized information is a limited summary of diagnosis, treatment, and/or medication information. It is not meant to be comprehensive and should be used as a tool to help the user understand and/or assess potential diagnostic and treatment options. It does NOT include all information about conditions, treatments, medications, side effects, or risks that may apply to a specific patient. It is not intended to be medical advice or a substitute for the medical advice, diagnosis, or treatment of a health care provider based on the health care provider's examination and assessment of a patient's specific and unique circumstances. Patients must speak with a health care provider for complete information about their health, medical questions, and treatment options, including any risks or benefits regarding use of medications. This information does not endorse any treatments or medications as safe, effective, or approved for treating a specific patient. UpToDate, Inc. and its affiliates disclaim any warranty or liability relating to this information or the use thereof.The use of this information is governed by the Terms of Use, available at https://www.woltersHullabaluuwer.com/en/know/clinical-effectiveness-terms. 2024© UpToDate, Inc. and its affiliates and/or licensors. All rights reserved.  Copyright   © 2024 UpToDate, Inc. and/or its affiliates. All rights reserved.    Patient Education     Routine physical for adults   The Basics   Written by the  "doctors and editors at [facility]   What is a physical? -- A physical is a routine visit, or \"check-up,\" with your doctor. You might also hear it called a \"wellness visit\" or \"preventive visit.\"  During each visit, the doctor will:   Ask about your physical and mental health   Ask about your habits, behaviors, and lifestyle   Do an exam   Give you vaccines if needed   Talk to you about any medicines you take   Give advice about your health   Answer your questions  Getting regular check-ups is an important part of taking care of your health. It can help your doctor find and treat any problems you have. But it's also important for preventing health problems.  A routine physical is different from a \"sick visit.\" A sick visit is when you see a doctor because of a health concern or problem. Since physicals are scheduled ahead of time, you can think about what you want to ask the doctor.  How often should I get a physical? -- It depends on your age and health. In general, for people age 21 years and older:   If you are younger than 50 years, you might be able to get a physical every 3 years.   If you are 50 years or older, your doctor might recommend a physical every year.  If you have an ongoing health condition, like diabetes or high blood pressure, your doctor will probably want to see you more often.  What happens during a physical? -- In general, each visit will include:   Physical exam - The doctor or nurse will check your height, weight, heart rate, and blood pressure. They will also look at your eyes and ears. They will ask about how you are feeling and whether you have any symptoms that bother you.   Medicines - It's a good idea to bring a list of all the medicines you take to each doctor visit. Your doctor will talk to you about your medicines and answer any questions. Tell them if you are having any side effects that bother you. You should also tell them if you are having trouble paying for any of your " "medicines.   Habits and behaviors - This includes:   Your diet   Your exercise habits   Whether you smoke, drink alcohol, or use drugs   Whether you are sexually active   Whether you feel safe at home  Your doctor will talk to you about things you can do to improve your health and lower your risk of health problems. They will also offer help and support. For example, if you want to quit smoking, they can give you advice and might prescribe medicines. If you want to improve your diet or get more physical activity, they can help you with this, too.   Lab tests, if needed - The tests you get will depend on your age and situation. For example, your doctor might want to check your:   Cholesterol   Blood sugar   Iron level   Vaccines - The recommended vaccines will depend on your age, health, and what vaccines you already had. Vaccines are very important because they can prevent certain serious or deadly infections.   Discussion of screening - \"Screening\" means checking for diseases or other health problems before they cause symptoms. Your doctor can recommend screening based on your age, risk, and preferences. This might include tests to check for:   Cancer, such as breast, prostate, cervical, ovarian, colorectal, prostate, lung, or skin cancer   Sexually transmitted infections, such as chlamydia and gonorrhea   Mental health conditions like depression and anxiety  Your doctor will talk to you about the different types of screening tests. They can help you decide which screenings to have. They can also explain what the results might mean.   Answering questions - The physical is a good time to ask the doctor or nurse questions about your health. If needed, they can refer you to other doctors or specialists, too.  Adults older than 65 years often need other care, too. As you get older, your doctor will talk to you about:   How to prevent falling at home   Hearing or vision tests   Memory testing   How to take your " medicines safely   Making sure that you have the help and support you need at home  All topics are updated as new evidence becomes available and our peer review process is complete.  This topic retrieved from [employer] on: May 02, 2024.  Topic 619577 Version 1.0  Release: 32.4.3 - C32.122  © 2024 UpToDate, Inc. and/or its affiliates. All rights reserved.  Consumer Information Use and Disclaimer   Disclaimer: This generalized information is a limited summary of diagnosis, treatment, and/or medication information. It is not meant to be comprehensive and should be used as a tool to help the user understand and/or assess potential diagnostic and treatment options. It does NOT include all information about conditions, treatments, medications, side effects, or risks that may apply to a specific patient. It is not intended to be medical advice or a substitute for the medical advice, diagnosis, or treatment of a health care provider based on the health care provider's examination and assessment of a patient's specific and unique circumstances. Patients must speak with a health care provider for complete information about their health, medical questions, and treatment options, including any risks or benefits regarding use of medications. This information does not endorse any treatments or medications as safe, effective, or approved for treating a specific patient. UpToDate, Inc. and its affiliates disclaim any warranty or liability relating to this information or the use thereof.The use of this information is governed by the Terms of Use, available at https://www.woltersTakeacoderuwer.com/en/know/clinical-effectiveness-terms. 2024© UpToDate, Inc. and its affiliates and/or licensors. All rights reserved.  Copyright   © 2024 UpToDate, Inc. and/or its affiliates. All rights reserved.    Patient Education     Routine physical for adults   The Basics   Written by the doctors and editors at [employer]   What is a physical? -- A physical is a  "routine visit, or \"check-up,\" with your doctor. You might also hear it called a \"wellness visit\" or \"preventive visit.\"  During each visit, the doctor will:   Ask about your physical and mental health   Ask about your habits, behaviors, and lifestyle   Do an exam   Give you vaccines if needed   Talk to you about any medicines you take   Give advice about your health   Answer your questions  Getting regular check-ups is an important part of taking care of your health. It can help your doctor find and treat any problems you have. But it's also important for preventing health problems.  A routine physical is different from a \"sick visit.\" A sick visit is when you see a doctor because of a health concern or problem. Since physicals are scheduled ahead of time, you can think about what you want to ask the doctor.  How often should I get a physical? -- It depends on your age and health. In general, for people age 21 years and older:   If you are younger than 50 years, you might be able to get a physical every 3 years.   If you are 50 years or older, your doctor might recommend a physical every year.  If you have an ongoing health condition, like diabetes or high blood pressure, your doctor will probably want to see you more often.  What happens during a physical? -- In general, each visit will include:   Physical exam - The doctor or nurse will check your height, weight, heart rate, and blood pressure. They will also look at your eyes and ears. They will ask about how you are feeling and whether you have any symptoms that bother you.   Medicines - It's a good idea to bring a list of all the medicines you take to each doctor visit. Your doctor will talk to you about your medicines and answer any questions. Tell them if you are having any side effects that bother you. You should also tell them if you are having trouble paying for any of your medicines.   Habits and behaviors - This includes:   Your diet   Your exercise " "habits   Whether you smoke, drink alcohol, or use drugs   Whether you are sexually active   Whether you feel safe at home  Your doctor will talk to you about things you can do to improve your health and lower your risk of health problems. They will also offer help and support. For example, if you want to quit smoking, they can give you advice and might prescribe medicines. If you want to improve your diet or get more physical activity, they can help you with this, too.   Lab tests, if needed - The tests you get will depend on your age and situation. For example, your doctor might want to check your:   Cholesterol   Blood sugar   Iron level   Vaccines - The recommended vaccines will depend on your age, health, and what vaccines you already had. Vaccines are very important because they can prevent certain serious or deadly infections.   Discussion of screening - \"Screening\" means checking for diseases or other health problems before they cause symptoms. Your doctor can recommend screening based on your age, risk, and preferences. This might include tests to check for:   Cancer, such as breast, prostate, cervical, ovarian, colorectal, prostate, lung, or skin cancer   Sexually transmitted infections, such as chlamydia and gonorrhea   Mental health conditions like depression and anxiety  Your doctor will talk to you about the different types of screening tests. They can help you decide which screenings to have. They can also explain what the results might mean.   Answering questions - The physical is a good time to ask the doctor or nurse questions about your health. If needed, they can refer you to other doctors or specialists, too.  Adults older than 65 years often need other care, too. As you get older, your doctor will talk to you about:   How to prevent falling at home   Hearing or vision tests   Memory testing   How to take your medicines safely   Making sure that you have the help and support you need at home  All " "topics are updated as new evidence becomes available and our peer review process is complete.  This topic retrieved from [employer] on: May 02, 2024.  Topic 837696 Version 1.0  Release: 32.4.3 - C32.122  © 2024 UpToDate, Inc. and/or its affiliates. All rights reserved.  Consumer Information Use and Disclaimer   Disclaimer: This generalized information is a limited summary of diagnosis, treatment, and/or medication information. It is not meant to be comprehensive and should be used as a tool to help the user understand and/or assess potential diagnostic and treatment options. It does NOT include all information about conditions, treatments, medications, side effects, or risks that may apply to a specific patient. It is not intended to be medical advice or a substitute for the medical advice, diagnosis, or treatment of a health care provider based on the health care provider's examination and assessment of a patient's specific and unique circumstances. Patients must speak with a health care provider for complete information about their health, medical questions, and treatment options, including any risks or benefits regarding use of medications. This information does not endorse any treatments or medications as safe, effective, or approved for treating a specific patient. UpToDate, Inc. and its affiliates disclaim any warranty or liability relating to this information or the use thereof.The use of this information is governed by the Terms of Use, available at https://www.woltersAntares Energyuwer.com/en/know/clinical-effectiveness-terms. 2024© UpToDate, Inc. and its affiliates and/or licensors. All rights reserved.  Copyright   © 2024 UpToDate, Inc. and/or its affiliates. All rights reserved.    Patient Education     Routine physical for adults   The Basics   Written by the doctors and editors at [employer]   What is a physical? -- A physical is a routine visit, or \"check-up,\" with your doctor. You might also hear it called a " "\"wellness visit\" or \"preventive visit.\"  During each visit, the doctor will:   Ask about your physical and mental health   Ask about your habits, behaviors, and lifestyle   Do an exam   Give you vaccines if needed   Talk to you about any medicines you take   Give advice about your health   Answer your questions  Getting regular check-ups is an important part of taking care of your health. It can help your doctor find and treat any problems you have. But it's also important for preventing health problems.  A routine physical is different from a \"sick visit.\" A sick visit is when you see a doctor because of a health concern or problem. Since physicals are scheduled ahead of time, you can think about what you want to ask the doctor.  How often should I get a physical? -- It depends on your age and health. In general, for people age 21 years and older:   If you are younger than 50 years, you might be able to get a physical every 3 years.   If you are 50 years or older, your doctor might recommend a physical every year.  If you have an ongoing health condition, like diabetes or high blood pressure, your doctor will probably want to see you more often.  What happens during a physical? -- In general, each visit will include:   Physical exam - The doctor or nurse will check your height, weight, heart rate, and blood pressure. They will also look at your eyes and ears. They will ask about how you are feeling and whether you have any symptoms that bother you.   Medicines - It's a good idea to bring a list of all the medicines you take to each doctor visit. Your doctor will talk to you about your medicines and answer any questions. Tell them if you are having any side effects that bother you. You should also tell them if you are having trouble paying for any of your medicines.   Habits and behaviors - This includes:   Your diet   Your exercise habits   Whether you smoke, drink alcohol, or use drugs   Whether you are sexually " "active   Whether you feel safe at home  Your doctor will talk to you about things you can do to improve your health and lower your risk of health problems. They will also offer help and support. For example, if you want to quit smoking, they can give you advice and might prescribe medicines. If you want to improve your diet or get more physical activity, they can help you with this, too.   Lab tests, if needed - The tests you get will depend on your age and situation. For example, your doctor might want to check your:   Cholesterol   Blood sugar   Iron level   Vaccines - The recommended vaccines will depend on your age, health, and what vaccines you already had. Vaccines are very important because they can prevent certain serious or deadly infections.   Discussion of screening - \"Screening\" means checking for diseases or other health problems before they cause symptoms. Your doctor can recommend screening based on your age, risk, and preferences. This might include tests to check for:   Cancer, such as breast, prostate, cervical, ovarian, colorectal, prostate, lung, or skin cancer   Sexually transmitted infections, such as chlamydia and gonorrhea   Mental health conditions like depression and anxiety  Your doctor will talk to you about the different types of screening tests. They can help you decide which screenings to have. They can also explain what the results might mean.   Answering questions - The physical is a good time to ask the doctor or nurse questions about your health. If needed, they can refer you to other doctors or specialists, too.  Adults older than 65 years often need other care, too. As you get older, your doctor will talk to you about:   How to prevent falling at home   Hearing or vision tests   Memory testing   How to take your medicines safely   Making sure that you have the help and support you need at home  All topics are updated as new evidence becomes available and our peer review process " "is complete.  This topic retrieved from [employer] on: May 02, 2024.  Topic 138232 Version 1.0  Release: 32.4.3 - C32.122  © 2024 UpToDate, Inc. and/or its affiliates. All rights reserved.  Consumer Information Use and Disclaimer   Disclaimer: This generalized information is a limited summary of diagnosis, treatment, and/or medication information. It is not meant to be comprehensive and should be used as a tool to help the user understand and/or assess potential diagnostic and treatment options. It does NOT include all information about conditions, treatments, medications, side effects, or risks that may apply to a specific patient. It is not intended to be medical advice or a substitute for the medical advice, diagnosis, or treatment of a health care provider based on the health care provider's examination and assessment of a patient's specific and unique circumstances. Patients must speak with a health care provider for complete information about their health, medical questions, and treatment options, including any risks or benefits regarding use of medications. This information does not endorse any treatments or medications as safe, effective, or approved for treating a specific patient. UpToDate, Inc. and its affiliates disclaim any warranty or liability relating to this information or the use thereof.The use of this information is governed by the Terms of Use, available at https://www.AgentruntersSuperhumanuwer.com/en/know/clinical-effectiveness-terms. 2024© UpToDate, Inc. and its affiliates and/or licensors. All rights reserved.  Copyright   © 2024 UpToDate, Inc. and/or its affiliates. All rights reserved.    Patient Education     Routine physical for adults   The Basics   Written by the doctors and editors at [employer]   What is a physical? -- A physical is a routine visit, or \"check-up,\" with your doctor. You might also hear it called a \"wellness visit\" or \"preventive visit.\"  During each visit, the doctor will:   Ask about " "your physical and mental health   Ask about your habits, behaviors, and lifestyle   Do an exam   Give you vaccines if needed   Talk to you about any medicines you take   Give advice about your health   Answer your questions  Getting regular check-ups is an important part of taking care of your health. It can help your doctor find and treat any problems you have. But it's also important for preventing health problems.  A routine physical is different from a \"sick visit.\" A sick visit is when you see a doctor because of a health concern or problem. Since physicals are scheduled ahead of time, you can think about what you want to ask the doctor.  How often should I get a physical? -- It depends on your age and health. In general, for people age 21 years and older:   If you are younger than 50 years, you might be able to get a physical every 3 years.   If you are 50 years or older, your doctor might recommend a physical every year.  If you have an ongoing health condition, like diabetes or high blood pressure, your doctor will probably want to see you more often.  What happens during a physical? -- In general, each visit will include:   Physical exam - The doctor or nurse will check your height, weight, heart rate, and blood pressure. They will also look at your eyes and ears. They will ask about how you are feeling and whether you have any symptoms that bother you.   Medicines - It's a good idea to bring a list of all the medicines you take to each doctor visit. Your doctor will talk to you about your medicines and answer any questions. Tell them if you are having any side effects that bother you. You should also tell them if you are having trouble paying for any of your medicines.   Habits and behaviors - This includes:   Your diet   Your exercise habits   Whether you smoke, drink alcohol, or use drugs   Whether you are sexually active   Whether you feel safe at home  Your doctor will talk to you about things you can " "do to improve your health and lower your risk of health problems. They will also offer help and support. For example, if you want to quit smoking, they can give you advice and might prescribe medicines. If you want to improve your diet or get more physical activity, they can help you with this, too.   Lab tests, if needed - The tests you get will depend on your age and situation. For example, your doctor might want to check your:   Cholesterol   Blood sugar   Iron level   Vaccines - The recommended vaccines will depend on your age, health, and what vaccines you already had. Vaccines are very important because they can prevent certain serious or deadly infections.   Discussion of screening - \"Screening\" means checking for diseases or other health problems before they cause symptoms. Your doctor can recommend screening based on your age, risk, and preferences. This might include tests to check for:   Cancer, such as breast, prostate, cervical, ovarian, colorectal, prostate, lung, or skin cancer   Sexually transmitted infections, such as chlamydia and gonorrhea   Mental health conditions like depression and anxiety  Your doctor will talk to you about the different types of screening tests. They can help you decide which screenings to have. They can also explain what the results might mean.   Answering questions - The physical is a good time to ask the doctor or nurse questions about your health. If needed, they can refer you to other doctors or specialists, too.  Adults older than 65 years often need other care, too. As you get older, your doctor will talk to you about:   How to prevent falling at home   Hearing or vision tests   Memory testing   How to take your medicines safely   Making sure that you have the help and support you need at home  All topics are updated as new evidence becomes available and our peer review process is complete.  This topic retrieved from [employer] on: May 02, 2024.  Topic 886915 Version " 1.0  Release: 32.4.3 - C32.122  © 2024 UpToDate, Inc. and/or its affiliates. All rights reserved.  Consumer Information Use and Disclaimer   Disclaimer: This generalized information is a limited summary of diagnosis, treatment, and/or medication information. It is not meant to be comprehensive and should be used as a tool to help the user understand and/or assess potential diagnostic and treatment options. It does NOT include all information about conditions, treatments, medications, side effects, or risks that may apply to a specific patient. It is not intended to be medical advice or a substitute for the medical advice, diagnosis, or treatment of a health care provider based on the health care provider's examination and assessment of a patient's specific and unique circumstances. Patients must speak with a health care provider for complete information about their health, medical questions, and treatment options, including any risks or benefits regarding use of medications. This information does not endorse any treatments or medications as safe, effective, or approved for treating a specific patient. Up[employer]Date, Inc. and its affiliates disclaim any warranty or liability relating to this information or the use thereof.The use of this information is governed by the Terms of Use, available at https://www.woltersKalistickuwer.com/en/know/clinical-effectiveness-terms. 2024© UpToDate, Inc. and its affiliates and/or licensors. All rights reserved.  Copyright   © 2024 UpToDate, Inc. and/or its affiliates. All rights reserved.

## 2025-05-06 NOTE — ASSESSMENT & PLAN NOTE
ASCVD Risk: 17.1%  Home meds: rosuvastatin 20 mg     Plan  Cont home meds  Lipid panel  Orders:    Lipid Panel with Direct LDL reflex; Future

## 2025-05-06 NOTE — ASSESSMENT & PLAN NOTE
Well controlled  Home meds lisinopril  Requesting refills    Orders:    amLODIPine (NORVASC) 5 mg tablet; Take 1 tablet (5 mg total) by mouth daily

## 2025-05-06 NOTE — PROGRESS NOTES
"Adult Annual Physical  Name: Latoya Lewis      : 1960      MRN: [id number]  Encounter Provider: Evy Briscoe MD  Encounter Date: 2025   Encounter department: [facility]    :  Assessment & Plan  Primary hypertension         Mixed hyperlipidemia         Neck pain         Annual physical exam             Annual Physical Plan       History of Present Illness     Adult Annual Physical  Review of Systems      Objective   /80 (BP Location: Left arm, Patient Position: Sitting, Cuff Size: Standard)   Pulse 62   Temp 98 °F (36.7 °C) (Temporal)   Resp 16   Ht 5' 3\" (1.6 m)   Wt 80.7 kg (178 lb)   SpO2 95%   BMI 31.53 kg/m²     Physical Exam    "

## 2025-05-06 NOTE — PROGRESS NOTES
Name: Latoya Lewis      : 1960      MRN: [id number]  Encounter Provider: Evy Briscoe MD  Encounter Date: 2025   Encounter department: [facility]  :  Assessment & Plan  Primary hypertension  Stable, well controlled XXX  Home meds: amlodipine 5 mg QD     Plan  Cont home meds         Mixed hyperlipidemia  Home meds: rosuvastatin 20 mg       Neck pain  Asymptomatic  Home meds: naproxen 500 mg BID PRN    Plan:  Cont home meds             History of Present Illness {?Quick Links Encounters * My Last Note * Last Note in Specialty * Snapshot * Since Last Visit * History :28813}  Latoya is a 63 y/o female with PMH HTN, HLD and chronic back pain who presents today for her regular PCP follow up to monitor her chronic conditions.     Today she reports/ her  reports      Review of Systems    Objective {?Quick Links Trend Vitals * Enter New Vitals * Results Review * Timeline (Adult) * Labs * Imaging * Cardiology * Procedures * Lung Cancer Screening * Surgical eConsent :88368}  There were no vitals taken for this visit.     Physical Exam  {Administrative / Billing Section (Optional):22792}

## 2025-05-13 ENCOUNTER — PROCEDURE VISIT (OUTPATIENT)
Dept: FAMILY MEDICINE CLINIC | Facility: CLINIC | Age: 65
End: 2025-05-13

## 2025-05-13 ENCOUNTER — APPOINTMENT (OUTPATIENT)
Dept: LAB | Facility: CLINIC | Age: 65
End: 2025-05-13
Payer: COMMERCIAL

## 2025-05-13 VITALS
TEMPERATURE: 97.1 F | OXYGEN SATURATION: 98 % | BODY MASS INDEX: 31.71 KG/M2 | HEART RATE: 55 BPM | WEIGHT: 179 LBS | DIASTOLIC BLOOD PRESSURE: 80 MMHG | RESPIRATION RATE: 14 BRPM | SYSTOLIC BLOOD PRESSURE: 128 MMHG | HEIGHT: 63 IN

## 2025-05-13 DIAGNOSIS — Z00.00 ANNUAL PHYSICAL EXAM: ICD-10-CM

## 2025-05-13 DIAGNOSIS — E78.2 MIXED HYPERLIPIDEMIA: ICD-10-CM

## 2025-05-13 DIAGNOSIS — Z00.00 ANNUAL PHYSICAL EXAM: Primary | ICD-10-CM

## 2025-05-13 DIAGNOSIS — H61.23 IMPACTED CERUMEN OF BOTH EARS: ICD-10-CM

## 2025-05-13 DIAGNOSIS — H93.13 TINNITUS OF BOTH EARS: Primary | ICD-10-CM

## 2025-05-13 LAB
CHOLEST SERPL-MCNC: 137 MG/DL (ref ?–200)
HDLC SERPL-MCNC: 56 MG/DL
LDLC SERPL CALC-MCNC: 68 MG/DL (ref 0–100)
TRIGL SERPL-MCNC: 66 MG/DL (ref ?–150)

## 2025-05-13 PROCEDURE — 99213 OFFICE O/P EST LOW 20 MIN: CPT | Performed by: FAMILY MEDICINE

## 2025-05-13 PROCEDURE — 36415 COLL VENOUS BLD VENIPUNCTURE: CPT

## 2025-05-13 PROCEDURE — 69209 REMOVE IMPACTED EAR WAX UNI: CPT | Performed by: FAMILY MEDICINE

## 2025-05-13 PROCEDURE — 80061 LIPID PANEL: CPT

## 2025-05-13 NOTE — PROGRESS NOTES
"Ear cerumen removal    Date/Time: 5/13/2025 1:30 PM    Performed by: Evy Briscoe MD  Authorized by: Evy Briscoe MD    Universal Protocol:  Procedure performed by: (DR ANA MARIA FARLEY)  Consent: Verbal consent obtained. Written consent not obtained.  Risks and benefits: risks, benefits and alternatives were discussed  Consent given by: patient and spouse  Time out: Immediately prior to procedure a \"time out\" was called to verify the correct patient, procedure, equipment, support staff and site/side marked as required.  Patient understanding: patient states understanding of the procedure being performed  Patient consent: the patient's understanding of the procedure matches consent given  Procedure consent: procedure consent matches procedure scheduled  Required items: required blood products, implants, devices, and special equipment available  Patient identity confirmed: verbally with patient    Patient location:  Clinic  Indications / Diagnosis:  Bilateral cerumen impaction  Procedure details:     Local anesthetic:  None    Location:  Both ears    Procedure type: irrigation only      Approach:  External    Equipment used:  METAL EAR SYRINGE  Sedation:     Sedation type: NONE.  Post-procedure details:     Complication:  None    Hearing quality:  Normal    Patient tolerance of procedure:  Tolerated well, no immediate complications     "

## 2025-05-14 NOTE — PROGRESS NOTES
"Name: Latoya Lewis      : 1960      MRN: [id number]  Encounter Provider: Evy Briscoe MD  Encounter Date: 2025   Encounter department: [facility]  :  Assessment & Plan  Impacted cerumen of both ears  -Complained of ringing in ear and reduced hearing bilaterally, not sure how long    Plan  -Ear cerumen removal  Orders:    Ear cerumen removal    Tinnitus of both ears  -Complained of longstanding, worsening tinnitus  -S/p Debrox solution given last week      Plan  -Ear cerumen removal       Ear cerumen removal    Date/Time: 2025 1:00 PM    Performed by: Evy Briscoe MD  Authorized by: Evy Briscoe MD    Universal Protocol:  Procedure performed by: (DR ANA MARIA FARLEY)  Consent: Verbal consent obtained.  Risks and benefits: risks, benefits and alternatives were discussed  Consent given by: patient and spouse  Time out: Immediately prior to procedure a \"time out\" was called to verify the correct patient, procedure, equipment, support staff and site/side marked as required.  Patient understanding: patient states understanding of the procedure being performed  Patient consent: the patient's understanding of the procedure matches consent given  Procedure consent: procedure consent matches procedure scheduled  Required items: required blood products, implants, devices, and special equipment available  Patient identity confirmed: verbally with patient    Patient location:  Clinic  Procedure details:     Local anesthetic:  None    Location:  Both ears    Procedure type: irrigation only      Approach:  External  Post-procedure details:     Complication:  None    Hearing quality:  Normal    Patient tolerance of procedure:  Tolerated well, no immediate complications       History of Present Illness   65 y/o female with PMH of HTN, HLD, Chronic back and mood disorders presenting today after complaint of ringing in ear for unknown duration. She denies any vertigo, headache, " "ear discharge/ear pain, loss of balance or visual changes. She has been using debrox solution daily as instructed to by PCP.      Review of Systems   Constitutional:  Negative for fever.   HENT:  Negative for congestion, ear discharge and ear pain.    Respiratory:  Negative for cough and shortness of breath.    Cardiovascular:  Negative for chest pain and palpitations.   Gastrointestinal:  Negative for abdominal pain, constipation, diarrhea and vomiting.   All other systems reviewed and are negative.      Objective   /80 (BP Location: Left arm, Patient Position: Sitting, Cuff Size: Standard)   Pulse 55   Temp (!) 97.1 °F (36.2 °C) (Temporal)   Resp 14   Ht 5' 3\" (1.6 m)   Wt 81.2 kg (179 lb)   SpO2 98%   BMI 31.71 kg/m²      Physical Exam  Vitals reviewed.   Constitutional:       General: She is not in acute distress.     Appearance: Normal appearance.   HENT:      Right Ear: No decreased hearing noted. No swelling or tenderness. There is impacted cerumen.      Left Ear: No swelling or tenderness. There is impacted cerumen.      Ears:      Comments: -Impacted cerumen bilaterally, can't view tympanic membrane  -Post ear irrigation: Tympanic membranes visible and normal, with normal ear canals.     Nose: No congestion or rhinorrhea.      Mouth/Throat:      Mouth: Mucous membranes are moist.   Eyes:      Extraocular Movements: Extraocular movements intact.      Conjunctiva/sclera: Conjunctivae normal.   Cardiovascular:      Rate and Rhythm: Normal rate and regular rhythm.      Pulses: Normal pulses.      Heart sounds: Normal heart sounds.   Pulmonary:      Effort: Pulmonary effort is normal.      Breath sounds: Normal breath sounds.   Musculoskeletal:      Cervical back: Normal range of motion.   Skin:     General: Skin is warm.   Neurological:      General: No focal deficit present.      Mental Status: She is alert. Mental status is at baseline.   Psychiatric:         Mood and Affect: Mood normal.         " Behavior: Behavior normal.         Thought Content: Thought content normal.

## 2025-05-31 DIAGNOSIS — F41.9 ANXIETY: ICD-10-CM

## 2025-06-02 RX ORDER — HYDROXYZINE HYDROCHLORIDE 25 MG/1
25 TABLET, FILM COATED ORAL EVERY 6 HOURS
Qty: 120 TABLET | Refills: 0 | Status: SHIPPED | OUTPATIENT
Start: 2025-06-02

## 2025-06-05 ENCOUNTER — TELEPHONE (OUTPATIENT)
Dept: CARDIOLOGY CLINIC | Facility: CLINIC | Age: 65
End: 2025-06-05

## 2025-06-27 DIAGNOSIS — F41.9 ANXIETY: ICD-10-CM

## 2025-06-30 RX ORDER — HYDROXYZINE HYDROCHLORIDE 25 MG/1
25 TABLET, FILM COATED ORAL EVERY 6 HOURS
Qty: 120 TABLET | Refills: 0 | Status: SHIPPED | OUTPATIENT
Start: 2025-06-30

## 2025-07-26 ENCOUNTER — HOSPITAL ENCOUNTER (EMERGENCY)
Facility: HOSPITAL | Age: 65
Discharge: HOME | End: 2025-07-27
Attending: EMERGENCY MEDICINE
Payer: MEDICARE

## 2025-07-26 DIAGNOSIS — R10.84 GENERALIZED ABDOMINAL PAIN: Primary | ICD-10-CM

## 2025-07-26 DIAGNOSIS — F41.9 ANXIETY: ICD-10-CM

## 2025-07-26 DIAGNOSIS — R07.9 CHEST PAIN: ICD-10-CM

## 2025-07-26 PROCEDURE — 99284 EMERGENCY DEPT VISIT MOD MDM: CPT

## 2025-07-26 RX ORDER — FAMOTIDINE 10 MG/ML
20 INJECTION, SOLUTION INTRAVENOUS ONCE
Status: COMPLETED | OUTPATIENT
Start: 2025-07-27 | End: 2025-07-27

## 2025-07-27 ENCOUNTER — APPOINTMENT (EMERGENCY)
Dept: CT IMAGING | Facility: HOSPITAL | Age: 65
End: 2025-07-27
Payer: MEDICARE

## 2025-07-27 VITALS
SYSTOLIC BLOOD PRESSURE: 176 MMHG | OXYGEN SATURATION: 98 % | DIASTOLIC BLOOD PRESSURE: 103 MMHG | TEMPERATURE: 98.2 F | RESPIRATION RATE: 17 BRPM | HEART RATE: 45 BPM

## 2025-07-27 LAB
2HR DELTA HS TROPONIN: 0 NG/L
ALBUMIN SERPL BCG-MCNC: 4.5 G/DL (ref 3.5–5)
ALP SERPL-CCNC: 50 U/L (ref 34–104)
ALT SERPL W P-5'-P-CCNC: 7 U/L (ref 7–52)
ANION GAP SERPL CALCULATED.3IONS-SCNC: 11 MMOL/L (ref 4–13)
AST SERPL W P-5'-P-CCNC: 13 U/L (ref 13–39)
ATRIAL RATE: 53 BPM
BACTERIA UR QL AUTO: ABNORMAL /HPF
BASOPHILS # BLD AUTO: 0.02 THOUSANDS/ÂΜL (ref 0–0.1)
BASOPHILS NFR BLD AUTO: 0 % (ref 0–1)
BILIRUB SERPL-MCNC: 0.41 MG/DL (ref 0.2–1)
BILIRUB UR QL STRIP: NEGATIVE
BUN SERPL-MCNC: 9 MG/DL (ref 5–25)
CALCIUM SERPL-MCNC: 10 MG/DL (ref 8.4–10.2)
CARDIAC TROPONIN I PNL SERPL HS: 3 NG/L (ref ?–50)
CARDIAC TROPONIN I PNL SERPL HS: 3 NG/L (ref ?–50)
CHLORIDE SERPL-SCNC: 107 MMOL/L (ref 96–108)
CLARITY UR: CLEAR
CO2 SERPL-SCNC: 24 MMOL/L (ref 21–32)
COLOR UR: YELLOW
CREAT SERPL-MCNC: 1.01 MG/DL (ref 0.6–1.3)
EOSINOPHIL # BLD AUTO: 0.02 THOUSAND/ÂΜL (ref 0–0.61)
EOSINOPHIL NFR BLD AUTO: 0 % (ref 0–6)
ERYTHROCYTE [DISTWIDTH] IN BLOOD BY AUTOMATED COUNT: 15.9 % (ref 11.6–15.1)
GFR SERPL CREATININE-BSD FRML MDRD: 58 ML/MIN/1.73SQ M
GLUCOSE SERPL-MCNC: 100 MG/DL (ref 65–140)
GLUCOSE UR STRIP-MCNC: NEGATIVE MG/DL
HCT VFR BLD AUTO: 36 % (ref 34.8–46.1)
HGB BLD-MCNC: 11.9 G/DL (ref 11.5–15.4)
HGB UR QL STRIP.AUTO: NEGATIVE
IMM GRANULOCYTES # BLD AUTO: 0.01 THOUSAND/UL (ref 0–0.2)
IMM GRANULOCYTES NFR BLD AUTO: 0 % (ref 0–2)
KETONES UR STRIP-MCNC: NEGATIVE MG/DL
LEUKOCYTE ESTERASE UR QL STRIP: 500
LIPASE SERPL-CCNC: 18 U/L (ref 11–82)
LYMPHOCYTES # BLD AUTO: 2.5 THOUSANDS/ÂΜL (ref 0.6–4.47)
LYMPHOCYTES NFR BLD AUTO: 44 % (ref 14–44)
MCH RBC QN AUTO: 29.7 PG (ref 26.8–34.3)
MCHC RBC AUTO-ENTMCNC: 33.1 G/DL (ref 31.4–37.4)
MCV RBC AUTO: 90 FL (ref 82–98)
MONOCYTES # BLD AUTO: 0.32 THOUSAND/ÂΜL (ref 0.17–1.22)
MONOCYTES NFR BLD AUTO: 6 % (ref 4–12)
MUCOUS THREADS UR QL AUTO: ABNORMAL
NEUTROPHILS # BLD AUTO: 2.78 THOUSANDS/ÂΜL (ref 1.85–7.62)
NEUTS SEG NFR BLD AUTO: 50 % (ref 43–75)
NITRITE UR QL STRIP: NEGATIVE
NON-SQ EPI CELLS URNS QL MICRO: ABNORMAL /HPF
NRBC BLD AUTO-RTO: 0 /100 WBCS
P AXIS: 57 DEGREES
PH UR STRIP.AUTO: 6 [PH]
PLATELET # BLD AUTO: 295 THOUSANDS/UL (ref 149–390)
PMV BLD AUTO: 10.3 FL (ref 8.9–12.7)
POTASSIUM SERPL-SCNC: 3.3 MMOL/L (ref 3.5–5.3)
PR INTERVAL: 164 MS
PROT SERPL-MCNC: 7.7 G/DL (ref 6.4–8.4)
PROT UR STRIP-MCNC: ABNORMAL MG/DL
QRS AXIS: 56 DEGREES
QRSD INTERVAL: 72 MS
QT INTERVAL: 440 MS
QTC INTERVAL: 414 MS
RBC # BLD AUTO: 4.01 MILLION/UL (ref 3.81–5.12)
RBC #/AREA URNS AUTO: ABNORMAL /HPF
SODIUM SERPL-SCNC: 142 MMOL/L (ref 135–147)
SP GR UR STRIP.AUTO: 1.02 (ref 1–1.04)
T WAVE AXIS: 134 DEGREES
UROBILINOGEN UA: 1 MG/DL
VENTRICULAR RATE: 53 BPM
WBC # BLD AUTO: 5.65 THOUSAND/UL (ref 4.31–10.16)
WBC #/AREA URNS AUTO: ABNORMAL /HPF

## 2025-07-27 PROCEDURE — 85025 COMPLETE CBC W/AUTO DIFF WBC: CPT

## 2025-07-27 PROCEDURE — 93005 ELECTROCARDIOGRAM TRACING: CPT

## 2025-07-27 PROCEDURE — 84484 ASSAY OF TROPONIN QUANT: CPT

## 2025-07-27 PROCEDURE — 87086 URINE CULTURE/COLONY COUNT: CPT

## 2025-07-27 PROCEDURE — 81001 URINALYSIS AUTO W/SCOPE: CPT

## 2025-07-27 PROCEDURE — 36415 COLL VENOUS BLD VENIPUNCTURE: CPT

## 2025-07-27 PROCEDURE — 96374 THER/PROPH/DIAG INJ IV PUSH: CPT

## 2025-07-27 PROCEDURE — 81003 URINALYSIS AUTO W/O SCOPE: CPT

## 2025-07-27 PROCEDURE — 99285 EMERGENCY DEPT VISIT HI MDM: CPT

## 2025-07-27 PROCEDURE — 93010 ELECTROCARDIOGRAM REPORT: CPT | Performed by: INTERNAL MEDICINE

## 2025-07-27 PROCEDURE — 96375 TX/PRO/DX INJ NEW DRUG ADDON: CPT

## 2025-07-27 PROCEDURE — 80053 COMPREHEN METABOLIC PANEL: CPT

## 2025-07-27 PROCEDURE — 83690 ASSAY OF LIPASE: CPT

## 2025-07-27 PROCEDURE — 74177 CT ABD & PELVIS W/CONTRAST: CPT

## 2025-07-27 PROCEDURE — 96361 HYDRATE IV INFUSION ADD-ON: CPT

## 2025-07-27 RX ORDER — MAGNESIUM HYDROXIDE/ALUMINUM HYDROXICE/SIMETHICONE 120; 1200; 1200 MG/30ML; MG/30ML; MG/30ML
30 SUSPENSION ORAL ONCE
Status: COMPLETED | OUTPATIENT
Start: 2025-07-27 | End: 2025-07-27

## 2025-07-27 RX ADMIN — ALUMINUM HYDROXIDE, MAGNESIUM HYDROXIDE, AND DIMETHICONE 30 ML: 200; 20; 200 SUSPENSION ORAL at 04:34

## 2025-07-27 RX ADMIN — MORPHINE SULFATE 2 MG: 2 INJECTION, SOLUTION INTRAMUSCULAR; INTRAVENOUS at 00:16

## 2025-07-27 RX ADMIN — FAMOTIDINE 20 MG: 10 INJECTION, SOLUTION INTRAVENOUS at 00:16

## 2025-07-27 RX ADMIN — SODIUM CHLORIDE 1000 ML: 0.9 INJECTION, SOLUTION INTRAVENOUS at 00:12

## 2025-07-27 RX ADMIN — IOHEXOL 100 ML: 350 INJECTION, SOLUTION INTRAVENOUS at 02:51

## 2025-07-28 LAB — BACTERIA UR CULT: NORMAL

## 2025-07-28 RX ORDER — HYDROXYZINE HYDROCHLORIDE 25 MG/1
25 TABLET, FILM COATED ORAL EVERY 6 HOURS
Qty: 120 TABLET | Refills: 0 | Status: SHIPPED | OUTPATIENT
Start: 2025-07-28

## 2025-08-07 ENCOUNTER — HOSPITAL ENCOUNTER (OUTPATIENT)
Dept: NON INVASIVE DIAGNOSTICS | Facility: HOSPITAL | Age: 65
Discharge: HOME | End: 2025-08-07
Attending: INTERNAL MEDICINE
Payer: MEDICARE

## 2025-08-07 VITALS — WEIGHT: 179 LBS | BODY MASS INDEX: 31.71 KG/M2 | HEIGHT: 63 IN

## 2025-08-07 DIAGNOSIS — R07.9 CHEST PAIN, UNSPECIFIED TYPE: ICD-10-CM

## 2025-08-07 LAB
CHEST PAIN STATEMENT: NORMAL
MAX DIASTOLIC BP: 98 MMHG
MAX HR PERCENT: 87 %
MAX HR: 136 BPM
MAX PREDICTED HEART RATE: 155 BPM
PROTOCOL NAME: NORMAL
RATE PRESSURE PRODUCT: NORMAL
REASON FOR TERMINATION: NORMAL
SL CV STRESS RECOVERY BP: NORMAL MMHG
SL CV STRESS RECOVERY HR: 63 BPM
SL CV STRESS RECOVERY O2 SAT: 99 %
SL CV STRESS STAGE REACHED: 2
STRESS ANGINA INDEX: 0
STRESS BASELINE BP: NORMAL MMHG
STRESS BASELINE HR: 50 BPM
STRESS O2 SAT REST: 99 %
STRESS PEAK HR: 136 BPM
STRESS POST ESTIMATED WORKLOAD: 5.8 METS
STRESS POST EXERCISE DUR MIN: 4 MIN
STRESS POST EXERCISE DUR MIN: 4 MIN
STRESS POST EXERCISE DUR SEC: 0 SEC
STRESS POST EXERCISE DUR SEC: 0 SEC
STRESS POST O2 SAT PEAK: 90 %
STRESS POST PEAK BP: 200 MMHG
STRESS POST PEAK HR: 136 BPM
STRESS POST PEAK SYSTOLIC BP: 200 MMHG
TARGET HR FORMULA: NORMAL
TEST INDICATION: NORMAL

## 2025-08-07 PROCEDURE — 93018 CV STRESS TEST I&R ONLY: CPT | Performed by: INTERNAL MEDICINE

## 2025-08-07 PROCEDURE — 93017 CV STRESS TEST TRACING ONLY: CPT

## 2025-08-07 PROCEDURE — 93016 CV STRESS TEST SUPVJ ONLY: CPT | Performed by: INTERNAL MEDICINE

## 2025-08-18 ENCOUNTER — HOSPITAL ENCOUNTER (EMERGENCY)
Facility: HOSPITAL | Age: 65
Discharge: HOME | End: 2025-08-19
Attending: EMERGENCY MEDICINE | Admitting: EMERGENCY MEDICINE
Payer: MEDICARE

## 2025-08-18 DIAGNOSIS — K08.89 PAIN, DENTAL: ICD-10-CM

## 2025-08-18 DIAGNOSIS — R06.02 SHORTNESS OF BREATH: ICD-10-CM

## 2025-08-18 DIAGNOSIS — R07.9 CHEST PAIN: Primary | ICD-10-CM

## 2025-08-18 DIAGNOSIS — R51.9 HEADACHE: ICD-10-CM

## 2025-08-18 PROCEDURE — 99285 EMERGENCY DEPT VISIT HI MDM: CPT

## 2025-08-18 PROCEDURE — 93005 ELECTROCARDIOGRAM TRACING: CPT

## 2025-08-19 ENCOUNTER — APPOINTMENT (EMERGENCY)
Dept: RADIOLOGY | Facility: HOSPITAL | Age: 65
End: 2025-08-19
Payer: MEDICARE

## 2025-08-19 VITALS
TEMPERATURE: 98.5 F | OXYGEN SATURATION: 98 % | HEART RATE: 51 BPM | RESPIRATION RATE: 18 BRPM | SYSTOLIC BLOOD PRESSURE: 155 MMHG | DIASTOLIC BLOOD PRESSURE: 93 MMHG

## 2025-08-19 LAB
2HR DELTA HS TROPONIN: 0 NG/L
ALBUMIN SERPL BCG-MCNC: 4.5 G/DL (ref 3.5–5)
ALP SERPL-CCNC: 52 U/L (ref 34–104)
ALT SERPL W P-5'-P-CCNC: 7 U/L (ref 7–52)
ANION GAP SERPL CALCULATED.3IONS-SCNC: 10 MMOL/L (ref 4–13)
AST SERPL W P-5'-P-CCNC: 16 U/L (ref 13–39)
ATRIAL RATE: 59 BPM
BASOPHILS # BLD AUTO: 0.03 THOUSANDS/ÂΜL (ref 0–0.1)
BASOPHILS NFR BLD AUTO: 1 % (ref 0–1)
BILIRUB SERPL-MCNC: 0.41 MG/DL (ref 0.2–1)
BUN SERPL-MCNC: 11 MG/DL (ref 5–25)
CALCIUM SERPL-MCNC: 9.9 MG/DL (ref 8.4–10.2)
CARDIAC TROPONIN I PNL SERPL HS: 3 NG/L (ref ?–50)
CARDIAC TROPONIN I PNL SERPL HS: 3 NG/L (ref ?–50)
CHLORIDE SERPL-SCNC: 108 MMOL/L (ref 96–108)
CO2 SERPL-SCNC: 25 MMOL/L (ref 21–32)
CREAT SERPL-MCNC: 1.07 MG/DL (ref 0.6–1.3)
EOSINOPHIL # BLD AUTO: 0.03 THOUSAND/ÂΜL (ref 0–0.61)
EOSINOPHIL NFR BLD AUTO: 1 % (ref 0–6)
ERYTHROCYTE [DISTWIDTH] IN BLOOD BY AUTOMATED COUNT: 15 % (ref 11.6–15.1)
GFR SERPL CREATININE-BSD FRML MDRD: 54 ML/MIN/1.73SQ M
GLUCOSE SERPL-MCNC: 146 MG/DL (ref 65–140)
HCT VFR BLD AUTO: 35.5 % (ref 34.8–46.1)
HGB BLD-MCNC: 11.7 G/DL (ref 11.5–15.4)
IMM GRANULOCYTES # BLD AUTO: 0.01 THOUSAND/UL (ref 0–0.2)
IMM GRANULOCYTES NFR BLD AUTO: 0 % (ref 0–2)
LIPASE SERPL-CCNC: 27 U/L (ref 11–82)
LYMPHOCYTES # BLD AUTO: 2.22 THOUSANDS/ÂΜL (ref 0.6–4.47)
LYMPHOCYTES NFR BLD AUTO: 35 % (ref 14–44)
MCH RBC QN AUTO: 29.3 PG (ref 26.8–34.3)
MCHC RBC AUTO-ENTMCNC: 33 G/DL (ref 31.4–37.4)
MCV RBC AUTO: 89 FL (ref 82–98)
MONOCYTES # BLD AUTO: 0.46 THOUSAND/ÂΜL (ref 0.17–1.22)
MONOCYTES NFR BLD AUTO: 7 % (ref 4–12)
NEUTROPHILS # BLD AUTO: 3.59 THOUSANDS/ÂΜL (ref 1.85–7.62)
NEUTS SEG NFR BLD AUTO: 56 % (ref 43–75)
NRBC BLD AUTO-RTO: 0 /100 WBCS
P AXIS: 52 DEGREES
PLATELET # BLD AUTO: 273 THOUSANDS/UL (ref 149–390)
PMV BLD AUTO: 11.4 FL (ref 8.9–12.7)
POTASSIUM SERPL-SCNC: 3.9 MMOL/L (ref 3.5–5.3)
PR INTERVAL: 156 MS
PROT SERPL-MCNC: 7.5 G/DL (ref 6.4–8.4)
QRS AXIS: 50 DEGREES
QRSD INTERVAL: 68 MS
QT INTERVAL: 426 MS
QTC INTERVAL: 422 MS
RBC # BLD AUTO: 4 MILLION/UL (ref 3.81–5.12)
SODIUM SERPL-SCNC: 143 MMOL/L (ref 135–147)
T WAVE AXIS: 96 DEGREES
VENTRICULAR RATE: 59 BPM
WBC # BLD AUTO: 6.34 THOUSAND/UL (ref 4.31–10.16)

## 2025-08-19 PROCEDURE — 96361 HYDRATE IV INFUSION ADD-ON: CPT

## 2025-08-19 PROCEDURE — 36415 COLL VENOUS BLD VENIPUNCTURE: CPT

## 2025-08-19 PROCEDURE — 99285 EMERGENCY DEPT VISIT HI MDM: CPT

## 2025-08-19 PROCEDURE — 71045 X-RAY EXAM CHEST 1 VIEW: CPT

## 2025-08-19 PROCEDURE — 83690 ASSAY OF LIPASE: CPT

## 2025-08-19 PROCEDURE — 96374 THER/PROPH/DIAG INJ IV PUSH: CPT

## 2025-08-19 PROCEDURE — 93010 ELECTROCARDIOGRAM REPORT: CPT | Performed by: INTERNAL MEDICINE

## 2025-08-19 PROCEDURE — 80053 COMPREHEN METABOLIC PANEL: CPT

## 2025-08-19 PROCEDURE — 84484 ASSAY OF TROPONIN QUANT: CPT

## 2025-08-19 PROCEDURE — 85025 COMPLETE CBC W/AUTO DIFF WBC: CPT

## 2025-08-19 RX ORDER — ACETAMINOPHEN 500 MG
1000 TABLET ORAL EVERY 6 HOURS PRN
Qty: 60 TABLET | Refills: 0 | Status: SHIPPED | OUTPATIENT
Start: 2025-08-19

## 2025-08-19 RX ORDER — AMOXICILLIN 500 MG/1
500 CAPSULE ORAL EVERY 12 HOURS SCHEDULED
Qty: 14 CAPSULE | Refills: 0 | Status: SHIPPED | OUTPATIENT
Start: 2025-08-19 | End: 2025-08-26

## 2025-08-19 RX ORDER — KETOROLAC TROMETHAMINE 30 MG/ML
15 INJECTION, SOLUTION INTRAMUSCULAR; INTRAVENOUS ONCE
Status: COMPLETED | OUTPATIENT
Start: 2025-08-19 | End: 2025-08-19

## 2025-08-19 RX ORDER — AMOXICILLIN 500 MG/1
500 CAPSULE ORAL ONCE
Status: COMPLETED | OUTPATIENT
Start: 2025-08-19 | End: 2025-08-19

## 2025-08-19 RX ADMIN — SODIUM CHLORIDE 1000 ML: 0.9 INJECTION, SOLUTION INTRAVENOUS at 00:18

## 2025-08-19 RX ADMIN — AMOXICILLIN 500 MG: 500 CAPSULE ORAL at 03:02

## 2025-08-19 RX ADMIN — KETOROLAC TROMETHAMINE 15 MG: 30 INJECTION, SOLUTION INTRAMUSCULAR; INTRAVENOUS at 00:35

## (undated) DEVICE — GAUZE SPONGES,16 PLY: Brand: CURITY

## (undated) DEVICE — GLOVE INDICATOR PI UNDERGLOVE SZ 6.5 BLUE

## (undated) DEVICE — NEEDLE HYPO 22G X 1-1/2 IN

## (undated) DEVICE — SYRINGE 10ML LL

## (undated) DEVICE — 3M™ STERI-STRIP™ REINFORCED ADHESIVE SKIN CLOSURES, R1547, 1/2 IN X 4 IN (12 MM X 100 MM), 6 STRIPS/ENVELOPE: Brand: 3M™ STERI-STRIP™

## (undated) DEVICE — GLOVE SRG BIOGEL 6.5

## (undated) DEVICE — ADHESIVE SKIN HIGH VISCOSITY EXOFIN 1ML

## (undated) DEVICE — SCD SEQUENTIAL COMPRESSION COMFORT SLEEVE MEDIUM KNEE LENGTH: Brand: KENDALL SCD

## (undated) DEVICE — 2000CC GUARDIAN II: Brand: GUARDIAN

## (undated) DEVICE — ELECTRODE NEEDLE MOD E-Z CLEAN 2.75IN 7CM -0013M

## (undated) DEVICE — 10FR FRAZIER SUCTION HANDLE: Brand: CARDINAL HEALTH

## (undated) DEVICE — SUT VICRYL 2-0 SH 27 IN UNDYED J417H

## (undated) DEVICE — TUBING SUCTION 5MM X 12 FT

## (undated) DEVICE — SUT VICRYL 3-0 SH 27 IN J416H

## (undated) DEVICE — PLUMEPEN PRO 10FT

## (undated) DEVICE — DRESSING MEPILEX AG BORDER POST-OP 4 X 8 IN

## (undated) DEVICE — SUT MONOCRYL 4-0 PS-2 27 IN Y426H

## (undated) DEVICE — DRAPE EQUIPMENT RF WAND

## (undated) DEVICE — BETHLEHEM UNIVERSAL MINOR GEN: Brand: CARDINAL HEALTH

## (undated) DEVICE — CHLORAPREP HI-LITE 26ML ORANGE